# Patient Record
Sex: FEMALE | Race: WHITE | Employment: OTHER | URBAN - METROPOLITAN AREA
[De-identification: names, ages, dates, MRNs, and addresses within clinical notes are randomized per-mention and may not be internally consistent; named-entity substitution may affect disease eponyms.]

---

## 2017-01-09 ENCOUNTER — OFFICE VISIT (OUTPATIENT)
Dept: FAMILY MEDICINE CLINIC | Age: 77
End: 2017-01-09

## 2017-01-09 VITALS
TEMPERATURE: 98 F | OXYGEN SATURATION: 98 % | DIASTOLIC BLOOD PRESSURE: 72 MMHG | HEART RATE: 60 BPM | WEIGHT: 148 LBS | SYSTOLIC BLOOD PRESSURE: 112 MMHG | BODY MASS INDEX: 25.27 KG/M2 | HEIGHT: 64 IN

## 2017-01-09 DIAGNOSIS — E78.2 MIXED HYPERLIPIDEMIA: ICD-10-CM

## 2017-01-09 DIAGNOSIS — Z13.820 OSTEOPOROSIS SCREENING: ICD-10-CM

## 2017-01-09 DIAGNOSIS — I48.4 ATYPICAL ATRIAL FLUTTER (HCC): ICD-10-CM

## 2017-01-09 DIAGNOSIS — Z23 NEEDS FLU SHOT: ICD-10-CM

## 2017-01-09 DIAGNOSIS — I34.2 NON-RHEUMATIC MITRAL VALVE STENOSIS: Primary | ICD-10-CM

## 2017-01-09 DIAGNOSIS — M85.80 OSTEOPENIA: ICD-10-CM

## 2017-01-09 PROCEDURE — 99214 OFFICE O/P EST MOD 30 MIN: CPT | Performed by: FAMILY MEDICINE

## 2017-01-09 PROCEDURE — G0008 ADMIN INFLUENZA VIRUS VAC: HCPCS | Performed by: FAMILY MEDICINE

## 2017-01-09 PROCEDURE — 90662 IIV NO PRSV INCREASED AG IM: CPT | Performed by: FAMILY MEDICINE

## 2017-01-09 RX ORDER — DILTIAZEM HYDROCHLORIDE 180 MG/1
180 CAPSULE, COATED, EXTENDED RELEASE ORAL DAILY
Qty: 90 CAPSULE | Refills: 3 | Status: SHIPPED | OUTPATIENT
Start: 2017-01-09 | End: 2017-04-17 | Stop reason: SDUPTHER

## 2017-01-23 ENCOUNTER — NURSE ONLY (OUTPATIENT)
Dept: FAMILY MEDICINE CLINIC | Age: 77
End: 2017-01-23

## 2017-01-23 DIAGNOSIS — I34.2 NON-RHEUMATIC MITRAL VALVE STENOSIS: Primary | ICD-10-CM

## 2017-01-23 DIAGNOSIS — E78.2 MIXED HYPERLIPIDEMIA: ICD-10-CM

## 2017-01-23 DIAGNOSIS — I48.4 ATYPICAL ATRIAL FLUTTER (HCC): ICD-10-CM

## 2017-01-23 LAB
A/G RATIO: 1.8 (ref 1.1–2.2)
ALBUMIN SERPL-MCNC: 4.3 G/DL (ref 3.4–5)
ALP BLD-CCNC: 63 U/L (ref 40–129)
ALT SERPL-CCNC: 11 U/L (ref 10–40)
ANION GAP SERPL CALCULATED.3IONS-SCNC: 14 MMOL/L (ref 3–16)
AST SERPL-CCNC: 16 U/L (ref 15–37)
BILIRUB SERPL-MCNC: 0.8 MG/DL (ref 0–1)
BUN BLDV-MCNC: 18 MG/DL (ref 7–20)
CALCIUM SERPL-MCNC: 9.2 MG/DL (ref 8.3–10.6)
CHLORIDE BLD-SCNC: 99 MMOL/L (ref 99–110)
CHOLESTEROL, TOTAL: 242 MG/DL (ref 0–199)
CO2: 28 MMOL/L (ref 21–32)
CREAT SERPL-MCNC: 0.8 MG/DL (ref 0.6–1.2)
GFR AFRICAN AMERICAN: >60
GFR NON-AFRICAN AMERICAN: >60
GLOBULIN: 2.4 G/DL
GLUCOSE BLD-MCNC: 85 MG/DL (ref 70–99)
HCT VFR BLD CALC: 40.9 % (ref 36–48)
HDLC SERPL-MCNC: 39 MG/DL (ref 40–60)
HEMOGLOBIN: 13.6 G/DL (ref 12–16)
LDL CHOLESTEROL CALCULATED: 168 MG/DL
MCH RBC QN AUTO: 30.8 PG (ref 26–34)
MCHC RBC AUTO-ENTMCNC: 33.2 G/DL (ref 31–36)
MCV RBC AUTO: 92.8 FL (ref 80–100)
PDW BLD-RTO: 13.4 % (ref 12.4–15.4)
PLATELET # BLD: 224 K/UL (ref 135–450)
PMV BLD AUTO: 9.1 FL (ref 5–10.5)
POTASSIUM SERPL-SCNC: 4.3 MMOL/L (ref 3.5–5.1)
RBC # BLD: 4.41 M/UL (ref 4–5.2)
SODIUM BLD-SCNC: 141 MMOL/L (ref 136–145)
TOTAL PROTEIN: 6.7 G/DL (ref 6.4–8.2)
TRIGL SERPL-MCNC: 174 MG/DL (ref 0–150)
VLDLC SERPL CALC-MCNC: 35 MG/DL
WBC # BLD: 5.2 K/UL (ref 4–11)

## 2017-01-23 PROCEDURE — 36415 COLL VENOUS BLD VENIPUNCTURE: CPT | Performed by: FAMILY MEDICINE

## 2017-03-13 ENCOUNTER — TELEPHONE (OUTPATIENT)
Dept: FAMILY MEDICINE CLINIC | Age: 77
End: 2017-03-13

## 2017-04-17 DIAGNOSIS — I48.4 ATYPICAL ATRIAL FLUTTER (HCC): ICD-10-CM

## 2017-04-17 RX ORDER — DILTIAZEM HYDROCHLORIDE 180 MG/1
180 CAPSULE, COATED, EXTENDED RELEASE ORAL DAILY
Qty: 90 CAPSULE | Refills: 3 | Status: SHIPPED | OUTPATIENT
Start: 2017-04-17 | End: 2018-04-20 | Stop reason: SDUPTHER

## 2017-05-03 ENCOUNTER — OFFICE VISIT (OUTPATIENT)
Dept: CARDIOLOGY CLINIC | Age: 77
End: 2017-05-03

## 2017-05-03 VITALS
BODY MASS INDEX: 24.92 KG/M2 | DIASTOLIC BLOOD PRESSURE: 68 MMHG | HEART RATE: 51 BPM | WEIGHT: 146 LBS | SYSTOLIC BLOOD PRESSURE: 126 MMHG | HEIGHT: 64 IN

## 2017-05-03 DIAGNOSIS — I48.4 ATYPICAL ATRIAL FLUTTER (HCC): Primary | ICD-10-CM

## 2017-05-03 DIAGNOSIS — I34.0 SEVERE MITRAL REGURGITATION: ICD-10-CM

## 2017-05-03 DIAGNOSIS — I48.3 TYPICAL ATRIAL FLUTTER (HCC): ICD-10-CM

## 2017-05-03 DIAGNOSIS — I47.1 SUPRAVENTRICULAR TACHYCARDIA (HCC): ICD-10-CM

## 2017-05-03 PROCEDURE — 99213 OFFICE O/P EST LOW 20 MIN: CPT | Performed by: INTERNAL MEDICINE

## 2017-05-03 PROCEDURE — 93000 ELECTROCARDIOGRAM COMPLETE: CPT | Performed by: INTERNAL MEDICINE

## 2017-05-18 ENCOUNTER — HOSPITAL ENCOUNTER (OUTPATIENT)
Dept: MAMMOGRAPHY | Age: 77
Discharge: HOME | End: 2017-05-18
Attending: FAMILY MEDICINE | Admitting: FAMILY MEDICINE

## 2017-05-18 DIAGNOSIS — Z13.820 ENCOUNTER FOR SCREENING FOR OSTEOPOROSIS: ICD-10-CM

## 2017-05-18 DIAGNOSIS — Z12.31 VISIT FOR SCREENING MAMMOGRAM: ICD-10-CM

## 2017-07-24 ENCOUNTER — OFFICE VISIT (OUTPATIENT)
Dept: FAMILY MEDICINE CLINIC | Age: 77
End: 2017-07-24

## 2017-07-24 VITALS
WEIGHT: 148 LBS | OXYGEN SATURATION: 98 % | HEART RATE: 63 BPM | RESPIRATION RATE: 16 BRPM | BODY MASS INDEX: 25.4 KG/M2 | DIASTOLIC BLOOD PRESSURE: 78 MMHG | SYSTOLIC BLOOD PRESSURE: 128 MMHG

## 2017-07-24 DIAGNOSIS — Z98.890 S/P ABLATION OPERATION FOR ARRHYTHMIA: ICD-10-CM

## 2017-07-24 DIAGNOSIS — I48.0 PAROXYSMAL ATRIAL FIBRILLATION (HCC): Primary | ICD-10-CM

## 2017-07-24 DIAGNOSIS — Z86.79 S/P ABLATION OPERATION FOR ARRHYTHMIA: ICD-10-CM

## 2017-07-24 DIAGNOSIS — I34.0 NON-RHEUMATIC MITRAL REGURGITATION: ICD-10-CM

## 2017-07-24 DIAGNOSIS — Z23 NEED FOR PNEUMOCOCCAL VACCINATION: ICD-10-CM

## 2017-07-24 DIAGNOSIS — E78.2 MIXED HYPERLIPIDEMIA: ICD-10-CM

## 2017-07-24 PROCEDURE — 90732 PPSV23 VACC 2 YRS+ SUBQ/IM: CPT | Performed by: FAMILY MEDICINE

## 2017-07-24 PROCEDURE — G0009 ADMIN PNEUMOCOCCAL VACCINE: HCPCS | Performed by: FAMILY MEDICINE

## 2017-07-24 PROCEDURE — 99214 OFFICE O/P EST MOD 30 MIN: CPT | Performed by: FAMILY MEDICINE

## 2017-07-24 ASSESSMENT — PATIENT HEALTH QUESTIONNAIRE - PHQ9
SUM OF ALL RESPONSES TO PHQ QUESTIONS 1-9: 0
2. FEELING DOWN, DEPRESSED OR HOPELESS: 0
1. LITTLE INTEREST OR PLEASURE IN DOING THINGS: 0
SUM OF ALL RESPONSES TO PHQ9 QUESTIONS 1 & 2: 0

## 2017-10-17 ENCOUNTER — TELEPHONE (OUTPATIENT)
Dept: CARDIOLOGY CLINIC | Age: 77
End: 2017-10-17

## 2017-11-14 ENCOUNTER — TELEPHONE (OUTPATIENT)
Dept: CARDIOLOGY CLINIC | Age: 77
End: 2017-11-14

## 2017-12-13 ENCOUNTER — TELEPHONE (OUTPATIENT)
Dept: CARDIOLOGY CLINIC | Age: 77
End: 2017-12-13

## 2017-12-13 DIAGNOSIS — I48.4 ATYPICAL ATRIAL FLUTTER (HCC): ICD-10-CM

## 2017-12-28 ENCOUNTER — TELEPHONE (OUTPATIENT)
Dept: CARDIOLOGY CLINIC | Age: 77
End: 2017-12-28

## 2017-12-29 ENCOUNTER — TELEPHONE (OUTPATIENT)
Dept: CARDIOLOGY CLINIC | Age: 77
End: 2017-12-29

## 2018-01-15 DIAGNOSIS — I48.4 ATYPICAL ATRIAL FLUTTER (HCC): ICD-10-CM

## 2018-01-15 NOTE — TELEPHONE ENCOUNTER
Refills needed apixaban (ELIQUIS) 5 MG TABS tablet, diltiazem (CARDIZEM CD) 180 MG extended release capsule,  metoprolol tartrate (LOPRESSOR) 25 MG tablet     :  [facility] [address] [facility] [id number] - F [phone]

## 2018-01-24 ENCOUNTER — OFFICE VISIT (OUTPATIENT)
Dept: FAMILY MEDICINE CLINIC | Age: 78
End: 2018-01-24

## 2018-01-24 VITALS
HEART RATE: 62 BPM | SYSTOLIC BLOOD PRESSURE: 136 MMHG | BODY MASS INDEX: 25.58 KG/M2 | OXYGEN SATURATION: 97 % | WEIGHT: 149 LBS | DIASTOLIC BLOOD PRESSURE: 80 MMHG

## 2018-01-24 DIAGNOSIS — Z86.79 HISTORY OF ATRIAL FLUTTER: ICD-10-CM

## 2018-01-24 DIAGNOSIS — E78.2 MIXED HYPERLIPIDEMIA: ICD-10-CM

## 2018-01-24 DIAGNOSIS — I34.2 NON-RHEUMATIC MITRAL VALVE STENOSIS: ICD-10-CM

## 2018-01-24 DIAGNOSIS — Z23 NEEDS FLU SHOT: ICD-10-CM

## 2018-01-24 DIAGNOSIS — I48.0 PAROXYSMAL ATRIAL FIBRILLATION (HCC): Primary | ICD-10-CM

## 2018-01-24 LAB
A/G RATIO: 1.9 (ref 1.1–2.2)
ALBUMIN SERPL-MCNC: 4.5 G/DL (ref 3.4–5)
ALP BLD-CCNC: 57 U/L (ref 40–129)
ALT SERPL-CCNC: 12 U/L (ref 10–40)
ANION GAP SERPL CALCULATED.3IONS-SCNC: 14 MMOL/L (ref 3–16)
AST SERPL-CCNC: 17 U/L (ref 15–37)
BILIRUB SERPL-MCNC: 0.7 MG/DL (ref 0–1)
BUN BLDV-MCNC: 15 MG/DL (ref 7–20)
CALCIUM SERPL-MCNC: 9.4 MG/DL (ref 8.3–10.6)
CHLORIDE BLD-SCNC: 103 MMOL/L (ref 99–110)
CHOLESTEROL, TOTAL: 238 MG/DL (ref 0–199)
CO2: 27 MMOL/L (ref 21–32)
CREAT SERPL-MCNC: 0.9 MG/DL (ref 0.6–1.2)
GFR AFRICAN AMERICAN: >60
GFR NON-AFRICAN AMERICAN: >60
GLOBULIN: 2.4 G/DL
GLUCOSE BLD-MCNC: 96 MG/DL (ref 70–99)
HDLC SERPL-MCNC: 37 MG/DL (ref 40–60)
LDL CHOLESTEROL CALCULATED: 153 MG/DL
POTASSIUM SERPL-SCNC: 4.4 MMOL/L (ref 3.5–5.1)
SODIUM BLD-SCNC: 144 MMOL/L (ref 136–145)
TOTAL PROTEIN: 6.9 G/DL (ref 6.4–8.2)
TRIGL SERPL-MCNC: 240 MG/DL (ref 0–150)
VLDLC SERPL CALC-MCNC: 48 MG/DL

## 2018-01-24 PROCEDURE — 36415 COLL VENOUS BLD VENIPUNCTURE: CPT | Performed by: FAMILY MEDICINE

## 2018-01-24 PROCEDURE — 99214 OFFICE O/P EST MOD 30 MIN: CPT | Performed by: FAMILY MEDICINE

## 2018-01-24 PROCEDURE — 90662 IIV NO PRSV INCREASED AG IM: CPT | Performed by: FAMILY MEDICINE

## 2018-01-24 PROCEDURE — G0008 ADMIN INFLUENZA VIRUS VAC: HCPCS | Performed by: FAMILY MEDICINE

## 2018-01-24 ASSESSMENT — ENCOUNTER SYMPTOMS
WHEEZING: 0
SHORTNESS OF BREATH: 0
CHEST TIGHTNESS: 0

## 2018-01-29 ENCOUNTER — OFFICE VISIT (OUTPATIENT)
Dept: CARDIOLOGY CLINIC | Age: 78
End: 2018-01-29

## 2018-01-29 VITALS
DIASTOLIC BLOOD PRESSURE: 80 MMHG | BODY MASS INDEX: 25.7 KG/M2 | WEIGHT: 150.5 LBS | HEIGHT: 64 IN | OXYGEN SATURATION: 95 % | SYSTOLIC BLOOD PRESSURE: 134 MMHG | HEART RATE: 62 BPM

## 2018-01-29 DIAGNOSIS — I34.0 NON-RHEUMATIC MITRAL REGURGITATION: ICD-10-CM

## 2018-01-29 DIAGNOSIS — I48.4 ATYPICAL ATRIAL FLUTTER (HCC): Primary | ICD-10-CM

## 2018-01-29 PROCEDURE — 93000 ELECTROCARDIOGRAM COMPLETE: CPT | Performed by: NURSE PRACTITIONER

## 2018-01-29 PROCEDURE — 99213 OFFICE O/P EST LOW 20 MIN: CPT | Performed by: NURSE PRACTITIONER

## 2018-01-29 NOTE — LETTER
regurgitation; Osteopenia; and Shingles. Past Surgical History:   has a past surgical history that includes knee surgery (Left, 2010); Colonoscopy; and ablation of dysrhythmic focus (1/13/16). Home Medications:    Prior to Admission medications    Medication Sig Start Date End Date Taking? Authorizing Provider   apixaban (ELIQUIS) 5 MG TABS tablet Take 1 tablet by mouth 2 times daily 1/15/18  Yes Rosibel Dalal CNP   diltiazem (CARDIZEM CD) 180 MG extended release capsule Take 1 capsule by mouth daily 4/17/17  Yes Rosibel Dalal CNP   metoprolol tartrate (LOPRESSOR) 25 MG tablet Take 1 tablet by mouth 2 times daily 4/17/17  Yes Rosibel Dalal CNP   calcium-vitamin D (OSCAL-500) 500-200 MG-UNIT per tablet Take 1 tablet by mouth 2 times daily 1/14/16  Yes Bradley Lira MD       Allergies:  Lisinopril    Social History:   reports that she has never smoked. She has never used smokeless tobacco. She reports that she does not drink alcohol or use drugs. Family History: family history includes Cancer in her father and mother; High Cholesterol in her brother; Other in her mother; Stroke in her brother. Review of Systems   Constitutional: Negative. HENT: Negative. Eyes: Negative. Respiratory: Negative. Cardiovascular: see HPI   Gastrointestinal: Negative. Genitourinary: Negative. Musculoskeletal: Negative. Skin: Negative. Neurological: Negative. Hematological: Negative. Psychiatric/Behavioral: Negative. PHYSICAL EXAM:    Physical Examination:    /80   Pulse 62   Ht 5' 4\" (1.626 m)   Wt 150 lb 8 oz (68.3 kg)   SpO2 95%   BMI 25.83 kg/m²       Constitutional and general appearance: alert, cooperative, no distress and appears stated age  HEENT: PERRL, no cervical lymphadenopathy. No masses palpable.  Normal oral mucosa  Respiratory:  · Normal excursion and expansion without use of accessory muscles

## 2018-01-29 NOTE — PROGRESS NOTES
(LOPRESSOR) 25 MG tablet Take 1 tablet by mouth 2 times daily 4/17/17  Yes Lukas Anderson CNP   calcium-vitamin D (OSCAL-500) 500-200 MG-UNIT per tablet Take 1 tablet by mouth 2 times daily 1/14/16  Yes Namrata Juarez MD       Allergies:  Lisinopril    Social History:   reports that she has never smoked. She has never used smokeless tobacco. She reports that she does not drink alcohol or use drugs. Family History: family history includes Cancer in her father and mother; High Cholesterol in her brother; Other in her mother; Stroke in her brother. Review of Systems   Constitutional: Negative. HENT: Negative. Eyes: Negative. Respiratory: Negative. Cardiovascular: see HPI   Gastrointestinal: Negative. Genitourinary: Negative. Musculoskeletal: Negative. Skin: Negative. Neurological: Negative. Hematological: Negative. Psychiatric/Behavioral: Negative. PHYSICAL EXAM:    Physical Examination:    /80   Pulse 62   Ht 5' 4\" (1.626 m)   Wt 150 lb 8 oz (68.3 kg)   SpO2 95%   BMI 25.83 kg/m²      Constitutional and general appearance: alert, cooperative, no distress and appears stated age  HEENT: PERRL, no cervical lymphadenopathy. No masses palpable. Normal oral mucosa  Respiratory:  · Normal excursion and expansion without use of accessory muscles  · Resp auscultation: Normal breath sounds without dullness or wheezing  Cardiovascular:  · The apical impulse is not displaced  · Gr 2/6 systolic murmur.  Regular S1 and S2.  · Jugular venous pulsation Normal  · The carotid upstroke is normal in amplitude and contour without delay or bruit  · Peripheral pulses are symmetrical and full   Abdomen:  · No masses or tenderness  · Bowel sounds present  Extremities:  ·  No cyanosis or clubbing  ·  No lower extremity edema  ·  Skin: warm and dry  Neurological:  · Alert and oriented  · Moves all extremities well  · No abnormalities of mood, affect, memory, mentation, or behavior are noted    DATA:    ECG 1/29/2018:  Sinus bradycardia, HR 58    Echo 11/29/2016:  Normal left ventricle size with mild concentric left ventricular hypertrophy.   Normal systolic function with an estimated ejection fraction of 55%.   No regional wall motion abnormalities are seen.   Elevated left ventricular diastolic filling pressure ( E/e' 14.5 ).  The left atrium is severely dilated.   Moderate posterior mitral annular calcification is present.   Moderate mitral regurgitation.   Aortic valve appears sclerotic but opens adequately. Echo 1/27/2016:  -Normal left ventricle size, wall thickness and systolic function with an estimated ejection fraction of 55%.   -No regional wall motion abnormalities are seen.   -The left atrium is severly dilated.   -The right atrium is mildly dilated.   -Small ostium primum atrial septal defect with left-to-right shunt was visualized by color flow.   -Mitral annular calcification with restricted movement of the posterior leaflet.   -The maximum mitral valve pressure gradient is 12 mmHg and the mean pressure gradient is 3 mmHg.   -Mild mitral stenosis.   -At least moderate mitral regurgitation.   -Aortic valve appears sclerotic but opens adequately.   -Trace pulmonic and aortic valve regurgitation.   -Mild tricuspid regurgitation.   -Systolic pulmonary artery pressure (SPAP) is normal and estimated at 33mmHg (RA pressure 3 mmHg). ADAM 1/8/2016:  Normal left ventricle size and wall thickness and left ventricular systolic   function with an estimated ejection fraction of 55%.   Moderate to severe mitral regurgitation is present.   The left atrium is dilated.   A bubble study was performed and showed no evidence of shunting. CARDIOLOGY LABS:   CBC: No results for input(s): WBC, HGB, HCT, PLT in the last 72 hours. BMP: No results for input(s): NA, K, CO2, BUN, CREATININE, LABGLOM, GLUCOSE in the last 72 hours.   PT/INR: No results for input(s): PROTIME, INR in the last 72

## 2018-02-14 NOTE — COMMUNICATION BODY
hours.  APTT:No results for input(s): APTT in the last 72 hours. FASTING LIPID PANEL:  Lab Results   Component Value Date    HDL 37 01/24/2018    LDLCALC 153 01/24/2018    TRIG 240 01/24/2018     LIVER PROFILE:No results for input(s): AST, ALT, ALB in the last 72 hours. Assessment:   1. Atypical left sided atrial flutter left sided atrial tachycardia: s/p ablation both 1/13/2016 with recurrence on EKG 1/22/2016    -rhythm is sinus today on metoprolol and diltiazem   -on Eliquis for [id number] score 3 (age and gender)  2. Sinus bradycardia: asymptomatic  3. Mitral regurgitation: moderate on echo   4. Mitral stenosis: mild on echo 1/2016    Plan:   1. No changes today   2. Follow up with Dr. Rose Roberson in one year. Patient would like to stop anticoagulation. Recommend continuing due to asymptomatic atrial arrhythmias with recurrence post ablation.      Ad Bliss, [address]  [phone]

## 2018-02-23 ENCOUNTER — TELEPHONE (OUTPATIENT)
Dept: CARDIOLOGY CLINIC | Age: 78
End: 2018-02-23

## 2018-03-09 ENCOUNTER — TELEPHONE (OUTPATIENT)
Dept: CARDIOLOGY CLINIC | Age: 78
End: 2018-03-09

## 2018-03-09 NOTE — TELEPHONE ENCOUNTER
Pt states she is having cataract surgery on 5/1 and 6/5 and needs cardiac clearance and also needs the ok to hold Eliquis 4 days prior. Please send clearance to [facility] (Dr Kyrie Crandall) at [phone].

## 2018-03-23 ENCOUNTER — OFFICE VISIT (OUTPATIENT)
Dept: FAMILY MEDICINE CLINIC | Age: 78
End: 2018-03-23

## 2018-03-23 VITALS
DIASTOLIC BLOOD PRESSURE: 76 MMHG | SYSTOLIC BLOOD PRESSURE: 138 MMHG | OXYGEN SATURATION: 97 % | HEART RATE: 67 BPM | BODY MASS INDEX: 25.06 KG/M2 | WEIGHT: 146 LBS

## 2018-03-23 DIAGNOSIS — I10 HYPERTENSION, UNSPECIFIED TYPE: Primary | ICD-10-CM

## 2018-03-23 PROCEDURE — 99213 OFFICE O/P EST LOW 20 MIN: CPT | Performed by: FAMILY MEDICINE

## 2018-03-23 RX ORDER — ERYTHROMYCIN 5 MG/G
OINTMENT OPHTHALMIC
COMMUNITY
Start: 2018-03-07 | End: 2018-07-23

## 2018-03-23 RX ORDER — PREDNISOLONE ACETATE 10 MG/ML
SUSPENSION/ DROPS OPHTHALMIC
COMMUNITY
Start: 2018-03-07 | End: 2018-07-23

## 2018-03-30 ASSESSMENT — ENCOUNTER SYMPTOMS: BLURRED VISION: 0

## 2018-04-20 DIAGNOSIS — I48.4 ATYPICAL ATRIAL FLUTTER (HCC): ICD-10-CM

## 2018-04-20 RX ORDER — DILTIAZEM HYDROCHLORIDE 180 MG/1
180 CAPSULE, COATED, EXTENDED RELEASE ORAL DAILY
Qty: 90 CAPSULE | Refills: 3 | Status: SHIPPED | OUTPATIENT
Start: 2018-04-20 | End: 2019-01-28 | Stop reason: SDUPTHER

## 2018-04-23 ENCOUNTER — OFFICE VISIT (OUTPATIENT)
Dept: FAMILY MEDICINE CLINIC | Age: 78
End: 2018-04-23

## 2018-04-23 VITALS
SYSTOLIC BLOOD PRESSURE: 128 MMHG | DIASTOLIC BLOOD PRESSURE: 80 MMHG | OXYGEN SATURATION: 96 % | BODY MASS INDEX: 25.06 KG/M2 | WEIGHT: 146 LBS | HEART RATE: 59 BPM

## 2018-04-23 DIAGNOSIS — H25.013 CORTICAL AGE-RELATED CATARACT OF BOTH EYES: Primary | ICD-10-CM

## 2018-04-23 DIAGNOSIS — Z01.818 PREOP EXAMINATION: ICD-10-CM

## 2018-04-23 PROCEDURE — 99214 OFFICE O/P EST MOD 30 MIN: CPT | Performed by: FAMILY MEDICINE

## 2018-05-01 ENCOUNTER — HOSPITAL ENCOUNTER (OUTPATIENT)
Dept: SURGERY | Age: 78
Discharge: HOME | End: 2018-05-01
Attending: OPHTHALMOLOGY | Admitting: OPHTHALMOLOGY

## 2018-05-01 VITALS
TEMPERATURE: 98.3 F | DIASTOLIC BLOOD PRESSURE: 68 MMHG | HEIGHT: 64 IN | BODY MASS INDEX: 24.92 KG/M2 | HEART RATE: 49 BPM | SYSTOLIC BLOOD PRESSURE: 149 MMHG | OXYGEN SATURATION: 98 % | WEIGHT: 146 LBS | RESPIRATION RATE: 16 BRPM

## 2018-05-01 RX ORDER — DIPHENHYDRAMINE HYDROCHLORIDE 50 MG/ML
12.5 INJECTION INTRAMUSCULAR; INTRAVENOUS
Status: ACTIVE | OUTPATIENT
Start: 2018-05-01 | End: 2018-05-01

## 2018-05-01 RX ORDER — ONDANSETRON 2 MG/ML
4 INJECTION INTRAMUSCULAR; INTRAVENOUS PRN
Status: DISCONTINUED | OUTPATIENT
Start: 2018-05-01 | End: 2018-05-02 | Stop reason: HOSPADM

## 2018-05-01 RX ORDER — HYDRALAZINE HYDROCHLORIDE 20 MG/ML
5 INJECTION INTRAMUSCULAR; INTRAVENOUS EVERY 10 MIN PRN
Status: DISCONTINUED | OUTPATIENT
Start: 2018-05-01 | End: 2018-05-02 | Stop reason: HOSPADM

## 2018-05-01 RX ORDER — LABETALOL HYDROCHLORIDE 5 MG/ML
5 INJECTION, SOLUTION INTRAVENOUS EVERY 10 MIN PRN
Status: DISCONTINUED | OUTPATIENT
Start: 2018-05-01 | End: 2018-05-02 | Stop reason: HOSPADM

## 2018-05-01 RX ORDER — MORPHINE SULFATE 2 MG/ML
1 INJECTION, SOLUTION INTRAMUSCULAR; INTRAVENOUS EVERY 5 MIN PRN
Status: DISCONTINUED | OUTPATIENT
Start: 2018-05-01 | End: 2018-05-02 | Stop reason: HOSPADM

## 2018-05-01 RX ORDER — MEPERIDINE HYDROCHLORIDE 50 MG/ML
12.5 INJECTION INTRAMUSCULAR; INTRAVENOUS; SUBCUTANEOUS EVERY 5 MIN PRN
Status: DISCONTINUED | OUTPATIENT
Start: 2018-05-01 | End: 2018-05-02 | Stop reason: HOSPADM

## 2018-05-01 RX ORDER — SODIUM CHLORIDE, SODIUM LACTATE, POTASSIUM CHLORIDE, CALCIUM CHLORIDE 600; 310; 30; 20 MG/100ML; MG/100ML; MG/100ML; MG/100ML
INJECTION, SOLUTION INTRAVENOUS CONTINUOUS
Status: DISCONTINUED | OUTPATIENT
Start: 2018-05-01 | End: 2018-05-02 | Stop reason: HOSPADM

## 2018-05-01 RX ORDER — PROMETHAZINE HYDROCHLORIDE 25 MG/ML
6.25 INJECTION, SOLUTION INTRAMUSCULAR; INTRAVENOUS
Status: DISCONTINUED | OUTPATIENT
Start: 2018-05-01 | End: 2018-05-02 | Stop reason: HOSPADM

## 2018-05-01 RX ORDER — MORPHINE SULFATE 2 MG/ML
2 INJECTION, SOLUTION INTRAMUSCULAR; INTRAVENOUS EVERY 5 MIN PRN
Status: DISCONTINUED | OUTPATIENT
Start: 2018-05-01 | End: 2018-05-02 | Stop reason: HOSPADM

## 2018-05-01 RX ORDER — OXYCODONE HYDROCHLORIDE AND ACETAMINOPHEN 5; 325 MG/1; MG/1
1 TABLET ORAL PRN
Status: ACTIVE | OUTPATIENT
Start: 2018-05-01 | End: 2018-05-01

## 2018-05-01 RX ORDER — OXYCODONE HYDROCHLORIDE AND ACETAMINOPHEN 5; 325 MG/1; MG/1
2 TABLET ORAL PRN
Status: ACTIVE | OUTPATIENT
Start: 2018-05-01 | End: 2018-05-01

## 2018-05-01 RX ADMIN — SODIUM CHLORIDE, SODIUM LACTATE, POTASSIUM CHLORIDE, CALCIUM CHLORIDE: 600; 310; 30; 20 INJECTION, SOLUTION INTRAVENOUS at 11:08

## 2018-05-01 ASSESSMENT — PAIN - FUNCTIONAL ASSESSMENT: PAIN_FUNCTIONAL_ASSESSMENT: 0-10

## 2018-05-01 ASSESSMENT — PAIN SCALES - GENERAL: PAINLEVEL_OUTOF10: 0

## 2018-05-14 DIAGNOSIS — I48.4 ATYPICAL ATRIAL FLUTTER (HCC): ICD-10-CM

## 2018-05-21 ENCOUNTER — HOSPITAL ENCOUNTER (OUTPATIENT)
Dept: MAMMOGRAPHY | Age: 78
Discharge: HOME | End: 2018-05-21
Attending: FAMILY MEDICINE | Admitting: FAMILY MEDICINE

## 2018-05-21 DIAGNOSIS — Z12.31 ENCOUNTER FOR SCREENING MAMMOGRAM FOR BREAST CANCER: ICD-10-CM

## 2018-06-05 ENCOUNTER — HOSPITAL ENCOUNTER (OUTPATIENT)
Dept: SURGERY | Age: 78
Discharge: HOME | End: 2018-06-05
Attending: OPHTHALMOLOGY | Admitting: OPHTHALMOLOGY

## 2018-06-05 VITALS
HEIGHT: 64 IN | TEMPERATURE: 97.3 F | BODY MASS INDEX: 23.05 KG/M2 | SYSTOLIC BLOOD PRESSURE: 162 MMHG | WEIGHT: 135 LBS | OXYGEN SATURATION: 98 % | RESPIRATION RATE: 12 BRPM | DIASTOLIC BLOOD PRESSURE: 61 MMHG | HEART RATE: 50 BPM

## 2018-06-05 RX ORDER — PROMETHAZINE HYDROCHLORIDE 25 MG/ML
6.25 INJECTION, SOLUTION INTRAMUSCULAR; INTRAVENOUS
Status: ACTIVE | OUTPATIENT
Start: 2018-06-05 | End: 2018-06-05

## 2018-06-05 RX ORDER — OXYCODONE HYDROCHLORIDE AND ACETAMINOPHEN 5; 325 MG/1; MG/1
2 TABLET ORAL PRN
Status: ACTIVE | OUTPATIENT
Start: 2018-06-05 | End: 2018-06-05

## 2018-06-05 RX ORDER — ONDANSETRON 2 MG/ML
4 INJECTION INTRAMUSCULAR; INTRAVENOUS
Status: ACTIVE | OUTPATIENT
Start: 2018-06-05 | End: 2018-06-05

## 2018-06-05 RX ORDER — DIPHENHYDRAMINE HYDROCHLORIDE 50 MG/ML
12.5 INJECTION INTRAMUSCULAR; INTRAVENOUS
Status: ACTIVE | OUTPATIENT
Start: 2018-06-05 | End: 2018-06-05

## 2018-06-05 RX ORDER — LABETALOL HYDROCHLORIDE 5 MG/ML
5 INJECTION, SOLUTION INTRAVENOUS EVERY 10 MIN PRN
Status: DISCONTINUED | OUTPATIENT
Start: 2018-06-05 | End: 2018-06-06 | Stop reason: HOSPADM

## 2018-06-05 RX ORDER — OXYCODONE HYDROCHLORIDE AND ACETAMINOPHEN 5; 325 MG/1; MG/1
1 TABLET ORAL PRN
Status: ACTIVE | OUTPATIENT
Start: 2018-06-05 | End: 2018-06-05

## 2018-06-05 RX ORDER — SODIUM CHLORIDE, SODIUM LACTATE, POTASSIUM CHLORIDE, CALCIUM CHLORIDE 600; 310; 30; 20 MG/100ML; MG/100ML; MG/100ML; MG/100ML
INJECTION, SOLUTION INTRAVENOUS CONTINUOUS
Status: DISCONTINUED | OUTPATIENT
Start: 2018-06-05 | End: 2018-06-06 | Stop reason: HOSPADM

## 2018-06-05 RX ORDER — MORPHINE SULFATE 2 MG/ML
1 INJECTION, SOLUTION INTRAMUSCULAR; INTRAVENOUS EVERY 5 MIN PRN
Status: DISCONTINUED | OUTPATIENT
Start: 2018-06-05 | End: 2018-06-06 | Stop reason: HOSPADM

## 2018-06-05 RX ORDER — HYDRALAZINE HYDROCHLORIDE 20 MG/ML
5 INJECTION INTRAMUSCULAR; INTRAVENOUS EVERY 10 MIN PRN
Status: DISCONTINUED | OUTPATIENT
Start: 2018-06-05 | End: 2018-06-06 | Stop reason: HOSPADM

## 2018-06-05 RX ORDER — MEPERIDINE HYDROCHLORIDE 50 MG/ML
12.5 INJECTION INTRAMUSCULAR; INTRAVENOUS; SUBCUTANEOUS EVERY 5 MIN PRN
Status: DISCONTINUED | OUTPATIENT
Start: 2018-06-05 | End: 2018-06-06 | Stop reason: HOSPADM

## 2018-06-05 RX ORDER — MORPHINE SULFATE 2 MG/ML
2 INJECTION, SOLUTION INTRAMUSCULAR; INTRAVENOUS EVERY 5 MIN PRN
Status: DISCONTINUED | OUTPATIENT
Start: 2018-06-05 | End: 2018-06-06 | Stop reason: HOSPADM

## 2018-06-05 RX ADMIN — SODIUM CHLORIDE, SODIUM LACTATE, POTASSIUM CHLORIDE, CALCIUM CHLORIDE: 600; 310; 30; 20 INJECTION, SOLUTION INTRAVENOUS at 09:59

## 2018-06-05 ASSESSMENT — PAIN SCALES - GENERAL: PAINLEVEL_OUTOF10: 0

## 2018-06-05 ASSESSMENT — PAIN - FUNCTIONAL ASSESSMENT: PAIN_FUNCTIONAL_ASSESSMENT: 0-10

## 2018-06-08 ENCOUNTER — HOSPITAL ENCOUNTER (OUTPATIENT)
Dept: MAMMOGRAPHY | Age: 78
Discharge: HOME | End: 2018-06-08
Admitting: FAMILY MEDICINE

## 2018-06-08 ENCOUNTER — TELEPHONE (OUTPATIENT)
Dept: CARDIOLOGY CLINIC | Age: 78
End: 2018-06-08

## 2018-06-08 DIAGNOSIS — R92.8 ABNORMAL MAMMOGRAM: ICD-10-CM

## 2018-07-23 ENCOUNTER — OFFICE VISIT (OUTPATIENT)
Dept: FAMILY MEDICINE CLINIC | Age: 78
End: 2018-07-23

## 2018-07-23 VITALS
WEIGHT: 145 LBS | HEART RATE: 56 BPM | SYSTOLIC BLOOD PRESSURE: 128 MMHG | BODY MASS INDEX: 24.89 KG/M2 | OXYGEN SATURATION: 98 % | DIASTOLIC BLOOD PRESSURE: 70 MMHG

## 2018-07-23 DIAGNOSIS — I47.9 PAROXYSMAL TACHYCARDIA (HCC): Primary | ICD-10-CM

## 2018-07-23 DIAGNOSIS — Z23 NEED FOR SHINGLES VACCINE: ICD-10-CM

## 2018-07-23 DIAGNOSIS — I34.2 NON-RHEUMATIC MITRAL VALVE STENOSIS: ICD-10-CM

## 2018-07-23 DIAGNOSIS — E78.2 MIXED HYPERLIPIDEMIA: ICD-10-CM

## 2018-07-23 PROCEDURE — 99213 OFFICE O/P EST LOW 20 MIN: CPT | Performed by: FAMILY MEDICINE

## 2018-07-23 ASSESSMENT — ENCOUNTER SYMPTOMS
WHEEZING: 0
BLOOD IN STOOL: 0
CONSTIPATION: 0
SHORTNESS OF BREATH: 0
NAUSEA: 0
ABDOMINAL DISTENTION: 0
CHEST TIGHTNESS: 0
COLOR CHANGE: 0
ABDOMINAL PAIN: 0
COUGH: 0
VOMITING: 0
DIARRHEA: 0
EYES NEGATIVE: 1

## 2018-07-23 NOTE — PROGRESS NOTES
Negative for abdominal distention, abdominal pain, blood in stool, constipation, diarrhea, nausea and vomiting. Genitourinary: Negative for difficulty urinating and dysuria. Musculoskeletal: Negative for arthralgias. Skin: Negative for color change, pallor and rash. Neurological: Negative for dizziness, syncope, weakness, light-headedness and headaches. Speech difficulty:      Hematological: Negative. Psychiatric/Behavioral: Negative for dysphoric mood and sleep disturbance. The patient is not nervous/anxious. Physical Exam  /70 (Site: Right Arm, Position: Sitting, Cuff Size: Medium Adult)   Pulse 56   Wt 145 lb (65.8 kg)   SpO2 98%   BMI 24.89 kg/m²   Neck supple, no lad or tmg, no bruit  CV RRR 2/6 JACKELINE, Lungs CTA  Ext with strong pedal pulses, no edema      /70 (Site: Right Arm, Position: Sitting, Cuff Size: Medium Adult)   Pulse 56   Wt 145 lb (65.8 kg)   SpO2 98%   BMI 24.89 kg/m²     Assessment/Plan:    Miya Diaz was seen today for 6 month follow-up. Diagnoses and all orders for this visit:    Paroxysmal tachycardia [facility])   Per cardiology    Need for shingles vaccine  -     zoster recombinant adjuvanted vaccine (SHINGRIX) 50 MCG SUSR injection;  Inject 0.5 mLs into the muscle once for 1 dose    Non-rheumatic mitral valve stenosis   Stable, asymptomatic    Mixed hyperlipidemia   1/18 lipids reviewed, mod elevate LDL with HDL 37

## 2018-09-04 ENCOUNTER — TELEPHONE (OUTPATIENT)
Dept: CARDIOLOGY CLINIC | Age: 78
End: 2018-09-04

## 2018-10-25 ENCOUNTER — TELEPHONE (OUTPATIENT)
Dept: CARDIOLOGY CLINIC | Age: 78
End: 2018-10-25

## 2018-10-31 ENCOUNTER — OFFICE VISIT (OUTPATIENT)
Dept: FAMILY MEDICINE CLINIC | Age: 78
End: 2018-10-31
Payer: MEDICARE

## 2018-10-31 VITALS
DIASTOLIC BLOOD PRESSURE: 80 MMHG | HEIGHT: 65 IN | WEIGHT: 145.3 LBS | SYSTOLIC BLOOD PRESSURE: 130 MMHG | OXYGEN SATURATION: 95 % | HEART RATE: 96 BPM | BODY MASS INDEX: 24.21 KG/M2 | TEMPERATURE: 98.7 F

## 2018-10-31 DIAGNOSIS — Z00.00 ROUTINE GENERAL MEDICAL EXAMINATION AT A HEALTH CARE FACILITY: Primary | ICD-10-CM

## 2018-10-31 PROCEDURE — G0439 PPPS, SUBSEQ VISIT: HCPCS | Performed by: FAMILY MEDICINE

## 2018-10-31 ASSESSMENT — ANXIETY QUESTIONNAIRES: GAD7 TOTAL SCORE: 2

## 2018-10-31 ASSESSMENT — LIFESTYLE VARIABLES: HOW OFTEN DO YOU HAVE A DRINK CONTAINING ALCOHOL: 0

## 2018-10-31 ASSESSMENT — PATIENT HEALTH QUESTIONNAIRE - PHQ9
SUM OF ALL RESPONSES TO PHQ QUESTIONS 1-9: 0
SUM OF ALL RESPONSES TO PHQ QUESTIONS 1-9: 0

## 2018-10-31 NOTE — PATIENT INSTRUCTIONS
doctor can tell you the best ways to protect your bones from thinning. Follow-up care is a key part of your treatment and safety. Be sure to make and go to all appointments, and call your doctor if you are having problems. It's also a good idea to know your test results and keep a list of the medicines you take. How can you care for yourself at home? Get enough calcium and vitamin D. The [place] [employer] recommends adults younger than age 46 need 1,000 mg of calcium and 600 IU of vitamin D each day. Women ages 46 to 79 need 1,200 mg of calcium and 600 IU of vitamin D each day. Men ages 46 to 79 need 1,000 mg of calcium and 600 IU of vitamin D each day. Adults 71 and older need 1,200 mg of calcium and 800 IU of vitamin D each day. Eat foods rich in calcium, like yogurt, cheese, milk, and dark green vegetables. Eat foods rich in vitamin D, like eggs, fatty fish, cereal, and fortified milk. Get some sunshine. Your body uses sunshine to make its own vitamin D. The safest time to be out in the sun is before 10 a.m. or after 3 p.m. Avoid getting sunburned. Sunburn can increase your risk of skin cancer. Talk to your doctor about taking a calcium plus vitamin D supplement. Ask about what type of calcium is right for you, and how much to take at a time. Adults ages 23 to 48 should not get more than 2,500 mg of calcium and 4,000 IU of vitamin D each day, whether it is from supplements and/or food. Adults ages 46 and older should not get more than 2,000 mg of calcium and 4,000 IU of vitamin D each day from supplements and/or food. Get regular bone-building exercise. Weight-bearing and resistance exercises keep bones healthy by working the muscles and bones against gravity. Start out at an exercise level that feels right for you. Add a little at a time until you can do the following:  Do 30 minutes of weight-bearing exercise on most days of the week.  Walking, jogging, stair climbing, and dancing are good

## 2019-01-15 ENCOUNTER — TELEPHONE (OUTPATIENT)
Dept: FAMILY MEDICINE CLINIC | Age: 79
End: 2019-01-15

## 2019-01-15 NOTE — TELEPHONE ENCOUNTER
Patient's daughter, Alaina Rolle, called and would like for Gomez Haider to call her back to discuss issues regarding a change in her mom's memory status. She said that she wants to give Dr. Tee Kimble a heads up on what is going on with her mom. She said that her mom has an appointment to see Dr. Tee Kimble on 01.23.19 and she will be coming with her mom to the office visit. She is on her mom's HIPAA and her mom is aware that she is calling the office. She is aware that Gomez Haider is not in the office today.

## 2019-01-16 NOTE — TELEPHONE ENCOUNTER
Called pt's daughter, Radha Hardy, back. She stated that she wants to do a memory assessment, talk about pt getting life line and also talk about getting bars installed in pt's bathroom but does not want pt to know about it. I spoke to RS about this and she stated that Radha Hardy should be up front with mother so there is no secrecy with pt's health. I advised daughter and they will be seeing Austin Goncalves on 1/23/18.     Future Appointments  Date Time Provider Carolina Alves   1/23/2019 8:45 AM MD CHIO Hillman [facility]   1/28/2019 2:00 PM MD Renny Manrique [facility]   11/6/2019 8:00 AM SCHEDULE, [facility] AWDANY BARROSO [facility], [place]

## 2019-01-23 ENCOUNTER — OFFICE VISIT (OUTPATIENT)
Dept: FAMILY MEDICINE CLINIC | Age: 79
End: 2019-01-23
Payer: MEDICARE

## 2019-01-23 VITALS
OXYGEN SATURATION: 97 % | WEIGHT: 143 LBS | RESPIRATION RATE: 16 BRPM | BODY MASS INDEX: 23.82 KG/M2 | DIASTOLIC BLOOD PRESSURE: 84 MMHG | HEIGHT: 65 IN | SYSTOLIC BLOOD PRESSURE: 146 MMHG | HEART RATE: 70 BPM

## 2019-01-23 DIAGNOSIS — G31.84 MCI (MILD COGNITIVE IMPAIRMENT): Primary | ICD-10-CM

## 2019-01-23 DIAGNOSIS — E78.2 MIXED HYPERLIPIDEMIA: ICD-10-CM

## 2019-01-23 DIAGNOSIS — Z23 NEEDS FLU SHOT: ICD-10-CM

## 2019-01-23 LAB
A/G RATIO: 1.7 (ref 1.1–2.2)
ALBUMIN SERPL-MCNC: 4.5 G/DL (ref 3.4–5)
ALP BLD-CCNC: 65 U/L (ref 40–129)
ALT SERPL-CCNC: 11 U/L (ref 10–40)
ANION GAP SERPL CALCULATED.3IONS-SCNC: 12 MMOL/L (ref 3–16)
AST SERPL-CCNC: 15 U/L (ref 15–37)
BILIRUB SERPL-MCNC: 0.9 MG/DL (ref 0–1)
BUN BLDV-MCNC: 16 MG/DL (ref 7–20)
CALCIUM SERPL-MCNC: 9.9 MG/DL (ref 8.3–10.6)
CHLORIDE BLD-SCNC: 102 MMOL/L (ref 99–110)
CHOLESTEROL, TOTAL: 264 MG/DL (ref 0–199)
CO2: 29 MMOL/L (ref 21–32)
CREAT SERPL-MCNC: 1 MG/DL (ref 0.6–1.2)
GFR AFRICAN AMERICAN: >60
GFR NON-AFRICAN AMERICAN: 54
GLOBULIN: 2.7 G/DL
GLUCOSE BLD-MCNC: 103 MG/DL (ref 70–99)
HDLC SERPL-MCNC: 46 MG/DL (ref 40–60)
LDL CHOLESTEROL CALCULATED: 192 MG/DL
POTASSIUM SERPL-SCNC: 4.6 MMOL/L (ref 3.5–5.1)
SODIUM BLD-SCNC: 143 MMOL/L (ref 136–145)
TOTAL PROTEIN: 7.2 G/DL (ref 6.4–8.2)
TRIGL SERPL-MCNC: 130 MG/DL (ref 0–150)
TSH REFLEX FT4: 0.7 UIU/ML (ref 0.27–4.2)
VITAMIN B-12: 371 PG/ML (ref 211–911)
VLDLC SERPL CALC-MCNC: 26 MG/DL

## 2019-01-23 PROCEDURE — 36415 COLL VENOUS BLD VENIPUNCTURE: CPT | Performed by: FAMILY MEDICINE

## 2019-01-23 PROCEDURE — 90662 IIV NO PRSV INCREASED AG IM: CPT | Performed by: FAMILY MEDICINE

## 2019-01-23 PROCEDURE — 99214 OFFICE O/P EST MOD 30 MIN: CPT | Performed by: FAMILY MEDICINE

## 2019-01-23 PROCEDURE — G0008 ADMIN INFLUENZA VIRUS VAC: HCPCS | Performed by: FAMILY MEDICINE

## 2019-01-24 ENCOUNTER — TELEPHONE (OUTPATIENT)
Dept: FAMILY MEDICINE CLINIC | Age: 79
End: 2019-01-24

## 2019-01-24 NOTE — TELEPHONE ENCOUNTER
----- Message from Sander Tan MD sent at 1/24/2019  7:57 AM EST -----  B12, kid, tito and thyr labs nl. Chol has increased a fair amt, and b/c she hasn't gained wt and she is very active, I suspect this increase is due to genetics. Lowering chol has been linked with decreasing risk of dementia, and therefore I'd like to rx a chol med. Pls rx lipitor 10 1qd 30+5 or 90+3, her choice. I'll leilani chol at 6 mo visit.

## 2019-01-28 ENCOUNTER — OFFICE VISIT (OUTPATIENT)
Dept: CARDIOLOGY CLINIC | Age: 79
End: 2019-01-28
Payer: MEDICARE

## 2019-01-28 VITALS
WEIGHT: 145 LBS | HEIGHT: 65 IN | DIASTOLIC BLOOD PRESSURE: 62 MMHG | BODY MASS INDEX: 24.16 KG/M2 | HEART RATE: 51 BPM | SYSTOLIC BLOOD PRESSURE: 134 MMHG | OXYGEN SATURATION: 98 %

## 2019-01-28 DIAGNOSIS — I48.4 ATYPICAL ATRIAL FLUTTER (HCC): Primary | ICD-10-CM

## 2019-01-28 PROCEDURE — 93000 ELECTROCARDIOGRAM COMPLETE: CPT | Performed by: INTERNAL MEDICINE

## 2019-01-28 PROCEDURE — 99214 OFFICE O/P EST MOD 30 MIN: CPT | Performed by: INTERNAL MEDICINE

## 2019-01-28 RX ORDER — ATORVASTATIN CALCIUM 10 MG/1
10 TABLET, FILM COATED ORAL DAILY
COMMUNITY
End: 2019-01-28 | Stop reason: SDUPTHER

## 2019-01-28 RX ORDER — ATORVASTATIN CALCIUM 10 MG/1
10 TABLET, FILM COATED ORAL DAILY
Qty: 90 TABLET | Refills: 3 | Status: SHIPPED | OUTPATIENT
Start: 2019-01-28 | End: 2020-04-17 | Stop reason: SDUPTHER

## 2019-01-28 RX ORDER — DILTIAZEM HYDROCHLORIDE 180 MG/1
180 CAPSULE, COATED, EXTENDED RELEASE ORAL DAILY
Qty: 90 CAPSULE | Refills: 3 | Status: SHIPPED | OUTPATIENT
Start: 2019-01-28 | End: 2020-01-10

## 2019-03-04 ENCOUNTER — TELEPHONE (OUTPATIENT)
Dept: FAMILY MEDICINE CLINIC | Age: 79
End: 2019-03-04

## 2019-03-05 RX ORDER — HYDROXYZINE HYDROCHLORIDE 25 MG/1
25 TABLET, FILM COATED ORAL 2 TIMES DAILY PRN
COMMUNITY
End: 2019-03-05 | Stop reason: CLARIF

## 2019-03-05 RX ORDER — HYDROXYZINE HYDROCHLORIDE 25 MG/1
25 TABLET, FILM COATED ORAL 2 TIMES DAILY PRN
Qty: 14 TABLET | Refills: 0 | Status: SHIPPED | OUTPATIENT
Start: 2019-03-05 | End: 2019-03-11 | Stop reason: SDUPTHER

## 2019-03-11 ENCOUNTER — TELEPHONE (OUTPATIENT)
Dept: CARDIOLOGY CLINIC | Age: 79
End: 2019-03-11

## 2019-03-11 ENCOUNTER — OFFICE VISIT (OUTPATIENT)
Dept: FAMILY MEDICINE CLINIC | Age: 79
End: 2019-03-11
Payer: MEDICARE

## 2019-03-11 VITALS
SYSTOLIC BLOOD PRESSURE: 128 MMHG | BODY MASS INDEX: 23.49 KG/M2 | HEART RATE: 62 BPM | WEIGHT: 141 LBS | RESPIRATION RATE: 16 BRPM | HEIGHT: 65 IN | OXYGEN SATURATION: 98 % | DIASTOLIC BLOOD PRESSURE: 78 MMHG

## 2019-03-11 DIAGNOSIS — I48.4 ATYPICAL ATRIAL FLUTTER (HCC): ICD-10-CM

## 2019-03-11 DIAGNOSIS — F41.9 ANXIETY: Primary | ICD-10-CM

## 2019-03-11 DIAGNOSIS — E78.2 MIXED HYPERLIPIDEMIA: ICD-10-CM

## 2019-03-11 DIAGNOSIS — M25.531 PAIN IN BOTH WRISTS: ICD-10-CM

## 2019-03-11 DIAGNOSIS — M25.532 PAIN IN BOTH WRISTS: ICD-10-CM

## 2019-03-11 PROCEDURE — 99214 OFFICE O/P EST MOD 30 MIN: CPT | Performed by: FAMILY MEDICINE

## 2019-03-11 RX ORDER — HYDROXYZINE HYDROCHLORIDE 25 MG/1
TABLET, FILM COATED ORAL
Qty: 30 TABLET | Refills: 1 | Status: SHIPPED | OUTPATIENT
Start: 2019-03-11 | End: 2020-04-21 | Stop reason: SDUPTHER

## 2019-04-15 ENCOUNTER — TELEPHONE (OUTPATIENT)
Dept: FAMILY MEDICINE CLINIC | Age: 79
End: 2019-04-15

## 2019-04-15 NOTE — TELEPHONE ENCOUNTER
Patient called and wanted to know if our office gets samples of Eliquis. Patient states that she used to get samples from her heart doctor but he is no longer getting them. Patient states that the cost of the Eliquis goes up in November and December and she would get samples through out the year to hold on to until she needed them in November and December.

## 2019-04-15 NOTE — TELEPHONE ENCOUNTER
WE do not get samples. She can reach out to the patient care program through the  for possible price reduction.

## 2019-04-17 ENCOUNTER — TELEPHONE (OUTPATIENT)
Dept: CARDIOLOGY CLINIC | Age: 79
End: 2019-04-17

## 2019-05-23 ENCOUNTER — HOSPITAL ENCOUNTER (OUTPATIENT)
Dept: WOMENS IMAGING | Age: 79
Discharge: HOME | End: 2019-05-23
Payer: MEDICARE

## 2019-05-23 DIAGNOSIS — Z12.31 VISIT FOR SCREENING MAMMOGRAM: ICD-10-CM

## 2019-05-23 PROCEDURE — 77067 SCR MAMMO BI INCL CAD: CPT

## 2019-07-21 NOTE — PROGRESS NOTES
chest tightness, shortness of breath and wheezing. Cardiovascular: Negative for chest pain, palpitations and leg swelling. Gastrointestinal: Negative for abdominal distention, abdominal pain, blood in stool, constipation, diarrhea, nausea and vomiting. Genitourinary: Negative for difficulty urinating and dysuria. Musculoskeletal: Negative for arthralgias. Skin: Negative for color change, pallor and rash. Neurological: Negative for dizziness, syncope, weakness, light-headedness and headaches. Hematological: Negative. Psychiatric/Behavioral: Negative for dysphoric mood and sleep disturbance. The patient is nervous/anxious. Physical Exam   Constitutional: She is oriented to person, place, and time. She appears well-developed and well-nourished. HENT:   Head: Normocephalic and atraumatic. Eyes: Conjunctivae and EOM are normal. No scleral icterus. Cardiovascular: Normal rate and normal heart sounds. Slightly irreg   Pulmonary/Chest: Effort normal and breath sounds normal.   Musculoskeletal: Normal range of motion. Neurological: She is alert and oriented to person, place, and time. Skin: Skin is warm and dry. Psychiatric: She has a normal mood and affect. Her behavior is normal. Judgment and thought content normal.         /76 (Site: Right Upper Arm, Position: Sitting, Cuff Size: Medium Adult)   Pulse 68   Resp 16   Ht 5' 5\" (1.651 m)   Wt 148 lb (67.1 kg)   SpO2 98%   BMI 24.63 kg/m²     Assessment/Plan:    Camila Tuttle was seen today for 6 month follow-up. Diagnoses and all orders for this visit:    Atypical atrial flutter ([address]   Doing well, discussed with pt that meds will likely be lifelong    Anxiety   Hydroxyzine prn has been helpful. Pt hopes to cont to travel, though her travel partner friend is no longer able to travel. Pt feels strong and is hoping to find new travel companions or cont to travel with daughters.     Mixed hyperlipidemia   OK to cont 1/2 lipitor

## 2019-07-22 ENCOUNTER — OFFICE VISIT (OUTPATIENT)
Dept: FAMILY MEDICINE CLINIC | Age: 79
End: 2019-07-22
Payer: MEDICARE

## 2019-07-22 VITALS
RESPIRATION RATE: 16 BRPM | HEART RATE: 68 BPM | OXYGEN SATURATION: 98 % | SYSTOLIC BLOOD PRESSURE: 138 MMHG | DIASTOLIC BLOOD PRESSURE: 76 MMHG | BODY MASS INDEX: 24.66 KG/M2 | WEIGHT: 148 LBS | HEIGHT: 65 IN

## 2019-07-22 DIAGNOSIS — I48.4 ATYPICAL ATRIAL FLUTTER (HCC): Primary | ICD-10-CM

## 2019-07-22 DIAGNOSIS — F41.9 ANXIETY: ICD-10-CM

## 2019-07-22 DIAGNOSIS — E78.2 MIXED HYPERLIPIDEMIA: ICD-10-CM

## 2019-07-22 PROCEDURE — 99214 OFFICE O/P EST MOD 30 MIN: CPT | Performed by: FAMILY MEDICINE

## 2019-07-22 ASSESSMENT — ENCOUNTER SYMPTOMS
NAUSEA: 0
COLOR CHANGE: 0
CONSTIPATION: 0
CHEST TIGHTNESS: 0
EYES NEGATIVE: 1
BLOOD IN STOOL: 0
VOMITING: 0
WHEEZING: 0
DIARRHEA: 0
ABDOMINAL DISTENTION: 0
SHORTNESS OF BREATH: 0
COUGH: 0
ABDOMINAL PAIN: 0

## 2019-07-22 ASSESSMENT — PATIENT HEALTH QUESTIONNAIRE - PHQ9
1. LITTLE INTEREST OR PLEASURE IN DOING THINGS: 0
SUM OF ALL RESPONSES TO PHQ QUESTIONS 1-9: 0
SUM OF ALL RESPONSES TO PHQ QUESTIONS 1-9: 0
SUM OF ALL RESPONSES TO PHQ9 QUESTIONS 1 & 2: 0
2. FEELING DOWN, DEPRESSED OR HOPELESS: 0

## 2019-08-19 ENCOUNTER — TELEPHONE (OUTPATIENT)
Dept: CARDIOLOGY CLINIC | Age: 79
End: 2019-08-19

## 2019-09-19 ENCOUNTER — OFFICE VISIT (OUTPATIENT)
Dept: FAMILY MEDICINE CLINIC | Age: 79
End: 2019-09-19
Payer: MEDICARE

## 2019-09-19 VITALS
BODY MASS INDEX: 24.66 KG/M2 | WEIGHT: 148 LBS | DIASTOLIC BLOOD PRESSURE: 68 MMHG | OXYGEN SATURATION: 98 % | HEIGHT: 65 IN | RESPIRATION RATE: 16 BRPM | SYSTOLIC BLOOD PRESSURE: 122 MMHG | HEART RATE: 64 BPM

## 2019-09-19 DIAGNOSIS — M77.8 WRIST TENDONITIS: Primary | ICD-10-CM

## 2019-09-19 DIAGNOSIS — Z23 NEEDS FLU SHOT: ICD-10-CM

## 2019-09-19 DIAGNOSIS — G56.03 BILATERAL CARPAL TUNNEL SYNDROME: ICD-10-CM

## 2019-09-19 PROCEDURE — G0008 ADMIN INFLUENZA VIRUS VAC: HCPCS | Performed by: FAMILY MEDICINE

## 2019-09-19 PROCEDURE — 99213 OFFICE O/P EST LOW 20 MIN: CPT | Performed by: FAMILY MEDICINE

## 2019-09-19 PROCEDURE — 90653 IIV ADJUVANT VACCINE IM: CPT | Performed by: FAMILY MEDICINE

## 2019-09-20 ENCOUNTER — TELEPHONE (OUTPATIENT)
Dept: FAMILY MEDICINE CLINIC | Age: 79
End: 2019-09-20

## 2019-09-20 NOTE — TELEPHONE ENCOUNTER
Had shingles vaccine yesterday at [facility] in [place]. Will bring in proof of vaccination next visit.

## 2019-11-06 ENCOUNTER — OFFICE VISIT (OUTPATIENT)
Dept: FAMILY MEDICINE CLINIC | Age: 79
End: 2019-11-06
Payer: MEDICARE

## 2019-11-06 VITALS
RESPIRATION RATE: 14 BRPM | HEIGHT: 64 IN | DIASTOLIC BLOOD PRESSURE: 82 MMHG | BODY MASS INDEX: 25.61 KG/M2 | WEIGHT: 150 LBS | SYSTOLIC BLOOD PRESSURE: 124 MMHG | HEART RATE: 68 BPM | OXYGEN SATURATION: 97 %

## 2019-11-06 DIAGNOSIS — Z00.00 ROUTINE GENERAL MEDICAL EXAMINATION AT A HEALTH CARE FACILITY: Primary | ICD-10-CM

## 2019-11-06 PROCEDURE — G0439 PPPS, SUBSEQ VISIT: HCPCS | Performed by: FAMILY MEDICINE

## 2019-11-06 ASSESSMENT — LIFESTYLE VARIABLES: HOW OFTEN DO YOU HAVE A DRINK CONTAINING ALCOHOL: 0

## 2019-11-06 ASSESSMENT — PATIENT HEALTH QUESTIONNAIRE - PHQ9
SUM OF ALL RESPONSES TO PHQ QUESTIONS 1-9: 0
SUM OF ALL RESPONSES TO PHQ QUESTIONS 1-9: 0

## 2019-11-11 ENCOUNTER — OFFICE VISIT (OUTPATIENT)
Dept: FAMILY MEDICINE CLINIC | Age: 79
End: 2019-11-11
Payer: MEDICARE

## 2019-11-11 VITALS
WEIGHT: 149.2 LBS | BODY MASS INDEX: 25.47 KG/M2 | OXYGEN SATURATION: 98 % | SYSTOLIC BLOOD PRESSURE: 138 MMHG | RESPIRATION RATE: 16 BRPM | DIASTOLIC BLOOD PRESSURE: 84 MMHG | HEIGHT: 64 IN | HEART RATE: 65 BPM

## 2019-11-11 DIAGNOSIS — R73.01 IFG (IMPAIRED FASTING GLUCOSE): ICD-10-CM

## 2019-11-11 DIAGNOSIS — M53.3 SACROILIAC DYSFUNCTION: ICD-10-CM

## 2019-11-11 DIAGNOSIS — E78.2 MIXED HYPERLIPIDEMIA: Primary | ICD-10-CM

## 2019-11-11 LAB
CHOLESTEROL, TOTAL: 193 MG/DL (ref 0–199)
HDLC SERPL-MCNC: 44 MG/DL (ref 40–60)
LDL CHOLESTEROL CALCULATED: 118 MG/DL
TRIGL SERPL-MCNC: 156 MG/DL (ref 0–150)
VLDLC SERPL CALC-MCNC: 31 MG/DL

## 2019-11-11 PROCEDURE — 99213 OFFICE O/P EST LOW 20 MIN: CPT | Performed by: FAMILY MEDICINE

## 2019-11-11 PROCEDURE — 36415 COLL VENOUS BLD VENIPUNCTURE: CPT | Performed by: FAMILY MEDICINE

## 2019-11-11 ASSESSMENT — ENCOUNTER SYMPTOMS
SHORTNESS OF BREATH: 0
ABDOMINAL PAIN: 0
BACK PAIN: 1

## 2019-11-12 ENCOUNTER — TELEPHONE (OUTPATIENT)
Dept: FAMILY MEDICINE CLINIC | Age: 79
End: 2019-11-12

## 2019-11-12 LAB
ESTIMATED AVERAGE GLUCOSE: 108.3 MG/DL
HBA1C MFR BLD: 5.4 %

## 2019-11-18 ENCOUNTER — TELEPHONE (OUTPATIENT)
Dept: FAMILY MEDICINE CLINIC | Age: 79
End: 2019-11-18

## 2019-12-20 ENCOUNTER — TELEPHONE (OUTPATIENT)
Dept: CARDIOLOGY CLINIC | Age: 79
End: 2019-12-20

## 2020-01-10 RX ORDER — DILTIAZEM HYDROCHLORIDE 180 MG/1
CAPSULE, EXTENDED RELEASE ORAL
Qty: 90 CAPSULE | Refills: 0 | Status: SHIPPED | OUTPATIENT
Start: 2020-01-10 | End: 2020-04-20 | Stop reason: SDUPTHER

## 2020-01-10 NOTE — TELEPHONE ENCOUNTER
1/28/2019 RPS  RECOMMENDATIONS:  1. I recommend you remain on anticoagulation as you have been asymptomatic in the past with your atrial flutter. 2. If you develop symptoms and are found to be in atrial flutter, I recommend a repeat ablation. 3. Follow up with Ced Payne CNP in 1 year.      1/28/2019 ekg      1/28/2020 upcoming [facility] visit

## 2020-01-27 NOTE — PROGRESS NOTES
Prashanth Rashid MD   metoprolol tartrate (LOPRESSOR) 25 MG tablet TAKE ONE TABLET BY MOUTH TWICE A DAY 1/28/19  Yes Sander Trent MD   calcium-vitamin D (Miriam Gila) 500-200 MG-UNIT per tablet Take 1 tablet by mouth 2 times daily 1/14/16  Yes Tal Schroeder MD   hydrOXYzine (ATARAX) 25 MG tablet 1/2 - 1 po tid prn anxiety  Patient not taking: Reported on 1/28/2020 3/11/19   Benjamin Villagran MD       Allergies:  Lisinopril    Social History:   reports that she has never smoked. She has never used smokeless tobacco. She reports that she does not drink alcohol or use drugs. Family History: family history includes Cancer in her father and mother; High Cholesterol in her brother; Other in her mother; Stroke in her brother. Review of Systems   Constitutional: Negative. HENT: Negative. Eyes: Negative. Respiratory: Negative. Cardiovascular: see HPI   Gastrointestinal: Negative. Genitourinary: Negative. Musculoskeletal: Negative. Skin: Negative. Neurological: Negative. Hematological: Negative. Psychiatric/Behavioral: Negative. PHYSICAL EXAM:    Vital signs:    /78   Pulse 56   Ht 5' 4\" (1.626 m)   Wt 147 lb (66.7 kg)   BMI 25.23 kg/m²      Constitutional and general appearance: alert, cooperative, no distress and appears stated age  HEENT: PERRL, no cervical lymphadenopathy. No masses palpable. Normal oral mucosa  Respiratory:  · Normal excursion and expansion without use of accessory muscles  · Resp auscultation: Normal breath sounds without dullness or wheezing  Cardiovascular:  · The apical impulse is not displaced  · Gr 2/6 systolic murmur.  Regular S1 and S2.  · Jugular venous pulsation Normal  · The carotid upstroke is normal in amplitude and contour without delay or bruit  · Peripheral pulses are symmetrical and full   Abdomen:  · No masses or tenderness  · Bowel sounds present  Extremities:  ·  No cyanosis or clubbing  ·  No lower extremity edema  ·  Skin: warm and dry  Neurological:  · Alert and oriented  · Moves all extremities well  · No abnormalities of mood, affect, memory, mentation, or behavior are noted    DATA:    ECG 1/28/2020:  SB HR 56    Echo 11/29/2016:  Normal left ventricle size with mild concentric left ventricular hypertrophy.   Normal systolic function with an estimated ejection fraction of 55%.   No regional wall motion abnormalities are seen.   Elevated left ventricular diastolic filling pressure ( E/e' 14.5 ).  The left atrium is severely dilated.   Moderate posterior mitral annular calcification is present.   Moderate mitral regurgitation.   Aortic valve appears sclerotic but opens adequately. Echo 1/27/2016:  -Normal left ventricle size, wall thickness and systolic function with an estimated ejection fraction of 55%.   -No regional wall motion abnormalities are seen.   -The left atrium is severly dilated.   -The right atrium is mildly dilated.   -Small ostium primum atrial septal defect with left-to-right shunt was visualized by color flow.   -Mitral annular calcification with restricted movement of the posterior leaflet.   -The maximum mitral valve pressure gradient is 12 mmHg and the mean pressure gradient is 3 mmHg.   -Mild mitral stenosis.   -At least moderate mitral regurgitation.   -Aortic valve appears sclerotic but opens adequately.   -Trace pulmonic and aortic valve regurgitation.   -Mild tricuspid regurgitation.   -Systolic pulmonary artery pressure (SPAP) is normal and estimated at 33mmHg (RA pressure 3 mmHg). ADAM 1/8/2016:  Normal left ventricle size and wall thickness and left ventricular systolic   function with an estimated ejection fraction of 55%.   Moderate to severe mitral regurgitation is present.   The left atrium is dilated.   A bubble study was performed and showed no evidence of shunting. CARDIOLOGY LABS:   CBC: No results for input(s): WBC, HGB, HCT, PLT in the last 72 hours.   BMP: No results for input(s): NA, K, CO2,

## 2020-01-28 ENCOUNTER — OFFICE VISIT (OUTPATIENT)
Dept: CARDIOLOGY CLINIC | Age: 80
End: 2020-01-28
Payer: MEDICARE

## 2020-01-28 VITALS
HEIGHT: 64 IN | HEART RATE: 56 BPM | WEIGHT: 147 LBS | DIASTOLIC BLOOD PRESSURE: 78 MMHG | SYSTOLIC BLOOD PRESSURE: 112 MMHG | BODY MASS INDEX: 25.1 KG/M2

## 2020-01-28 PROCEDURE — 93000 ELECTROCARDIOGRAM COMPLETE: CPT | Performed by: NURSE PRACTITIONER

## 2020-01-28 PROCEDURE — 99213 OFFICE O/P EST LOW 20 MIN: CPT | Performed by: NURSE PRACTITIONER

## 2020-01-28 NOTE — LETTER
[address] [facility]  Phone: [phone]  Fax: [phone]     Genesis Gaines, APRN - CNP     1/28/2020     [facility], [address] [place] [address]     Patient: Bindu Conklin   MR Number: [id number]   YOB: 1940   Date of Visit: 1/28/2020     Dear Dr. Florina Lopez     Today I saw our mutual patient named above. Below are the relevant portions of my assessment and plan of care. If you have questions, please do not hesitate to call me. I look forward to following [facility] along with you. [facility]   Electrophysiology  Note              Date:  January 28, 2020  Patient name: Bindu Conklin  YOB: 1940    Primary Care physician: [facility][1] MD RIO    HISTORY OF PRESENT ILLNESS: The patient is a 78 y.o.  female with a history of atrial flutter, AT, mitral regurgitation, and mild mitral stenosis. She was admitted in 1/2016 with atrial flutter with RVR but was asymptomatic with the arrhythmia. ADAM in 1/2016 showed moderate to severe MR. In 1/2016 she had RFCA of a left sided atypical atrial flutter and left sided AT, originating from right superior pulmonary vein with Dr. Darlin Hannah. She returned in 1/2016 and her EKG showed atrial flutter. Echo in 1/2016 showed an EF of 55%. Repeat echo in 11/2016 showed an EF of 55% and moderate MR. She has been in sinus rhythm at subsequent visits. Today she is being seen for atrial flutter. EKG shows sinus natividad with a HR of 56. She is feeling well and denies chest pain, palpitations, shortness of breath, and dizziness. Past Medical History:   has a past medical history of Atrial flutter ([address] Atrial tachycardia ([address] CTS (carpal tunnel syndrome), HLD (hyperlipidemia), Hypertension, Mitral regurgitation, Mixed hyperlipidemia, Osteopenia, and Shingles.     Past Surgical History:   has a past surgical history that includes knee HEENT: PERRL, no cervical lymphadenopathy. No masses palpable. Normal oral mucosa  Respiratory:  · Normal excursion and expansion without use of accessory muscles  · Resp auscultation: Normal breath sounds without dullness or wheezing  Cardiovascular:  · The apical impulse is not displaced  · Gr 2/6 systolic murmur. Regular S1 and S2.  · Jugular venous pulsation Normal  · The carotid upstroke is normal in amplitude and contour without delay or bruit  · Peripheral pulses are symmetrical and full   Abdomen:  · No masses or tenderness  · Bowel sounds present  Extremities:  ·  No cyanosis or clubbing  ·  No lower extremity edema  ·  Skin: warm and dry  Neurological:  · Alert and oriented  · Moves all extremities well  · No abnormalities of mood, affect, memory, mentation, or behavior are noted    DATA:    ECG 1/28/2020:  SB HR 56    Echo 11/29/2016:  Normal left ventricle size with mild concentric left ventricular hypertrophy.   Normal systolic function with an estimated ejection fraction of 55%.   No regional wall motion abnormalities are seen.   Elevated left ventricular diastolic filling pressure ( E/e' 14.5 ).  The left atrium is severely dilated.   Moderate posterior mitral annular calcification is present.   Moderate mitral regurgitation.   Aortic valve appears sclerotic but opens adequately. Echo 1/27/2016:  -Normal left ventricle size, wall thickness and systolic function with an estimated ejection fraction of 55%.   -No regional wall motion abnormalities are seen.   -The left atrium is severly dilated.   -The right atrium is mildly dilated.   -Small ostium primum atrial septal defect with left-to-right shunt was visualized by color flow.   -Mitral annular calcification with restricted movement of the posterior leaflet.   -The maximum mitral valve pressure gradient is 12 mmHg and the mean pressure gradient is 3 mmHg.   -Mild mitral stenosis.   -At least moderate mitral regurgitation.  -Aortic valve appears sclerotic but opens adequately.   -Trace pulmonic and aortic valve regurgitation.   -Mild tricuspid regurgitation.   -Systolic pulmonary artery pressure (SPAP) is normal and estimated at 33mmHg (RA pressure 3 mmHg). ADAM 1/8/2016:  Normal left ventricle size and wall thickness and left ventricular systolic   function with an estimated ejection fraction of 55%.   Moderate to severe mitral regurgitation is present.   The left atrium is dilated.   A bubble study was performed and showed no evidence of shunting. CARDIOLOGY LABS:   CBC: No results for input(s): WBC, HGB, HCT, PLT in the last 72 hours. BMP: No results for input(s): NA, K, CO2, BUN, CREATININE, LABGLOM, GLUCOSE in the last 72 hours. PT/INR: No results for input(s): PROTIME, INR in the last 72 hours. APTT:No results for input(s): APTT in the last 72 hours. FASTING LIPID PANEL:  Lab Results   Component Value Date    HDL 44 11/11/2019    LDLCALC 118 11/11/2019    TRIG 156 11/11/2019     LIVER PROFILE:No results for input(s): AST, ALT, ALB in the last 72 hours. Assessment:   1. Atypical left sided atrial flutter left sided atrial tachycardia: stable    -s/p ablation both 1/13/2016 with recurrence on EKG 1/22/2016    -anticoagulation has continued due to lack of symptoms with arrhythmia previously    -[id number] score 3 (age and gender)  2. Sinus bradycardia: asymptomatic  3. Mitral regurgitation: moderate on echo   4. Mitral stenosis: mild on echo 1/2016  5. HLD: on statin, PCP managing     Plan:   1. Continue Cardizem, metoprolol, and Eliquis   2. Annual BMP and CBC (due now, ordered)  3.  Follow up in one year     [address]  [phone]      Sincerely,      Kasia Sharp, APRN - CNP

## 2020-01-29 ENCOUNTER — HOSPITAL ENCOUNTER (OUTPATIENT)
Age: 80
Discharge: HOME | End: 2020-01-29
Payer: MEDICARE

## 2020-01-29 LAB
ANION GAP SERPL CALCULATED.3IONS-SCNC: 12 MMOL/L (ref 3–16)
BASOPHILS ABSOLUTE: 0.1 K/UL (ref 0–0.2)
BASOPHILS RELATIVE PERCENT: 1 %
BUN BLDV-MCNC: 17 MG/DL (ref 7–20)
CALCIUM SERPL-MCNC: 9.9 MG/DL (ref 8.3–10.6)
CHLORIDE BLD-SCNC: 99 MMOL/L (ref 99–110)
CO2: 28 MMOL/L (ref 21–32)
CREAT SERPL-MCNC: 0.9 MG/DL (ref 0.6–1.2)
EOSINOPHILS ABSOLUTE: 0.1 K/UL (ref 0–0.6)
EOSINOPHILS RELATIVE PERCENT: 1.9 %
GFR AFRICAN AMERICAN: >60
GFR NON-AFRICAN AMERICAN: >60
GLUCOSE BLD-MCNC: 92 MG/DL (ref 70–99)
HCT VFR BLD CALC: 42 % (ref 36–48)
HEMOGLOBIN: 14 G/DL (ref 12–16)
LYMPHOCYTES ABSOLUTE: 1.2 K/UL (ref 1–5.1)
LYMPHOCYTES RELATIVE PERCENT: 21.5 %
MCH RBC QN AUTO: 31.3 PG (ref 26–34)
MCHC RBC AUTO-ENTMCNC: 33.5 G/DL (ref 31–36)
MCV RBC AUTO: 93.6 FL (ref 80–100)
MONOCYTES ABSOLUTE: 0.4 K/UL (ref 0–1.3)
MONOCYTES RELATIVE PERCENT: 7.8 %
NEUTROPHILS ABSOLUTE: 3.7 K/UL (ref 1.7–7.7)
NEUTROPHILS RELATIVE PERCENT: 67.8 %
PDW BLD-RTO: 12.6 % (ref 12.4–15.4)
PLATELET # BLD: 279 K/UL (ref 135–450)
PMV BLD AUTO: 9 FL (ref 5–10.5)
POTASSIUM SERPL-SCNC: 4.5 MMOL/L (ref 3.5–5.1)
RBC # BLD: 4.48 M/UL (ref 4–5.2)
SODIUM BLD-SCNC: 139 MMOL/L (ref 136–145)
WBC # BLD: 5.5 K/UL (ref 4–11)

## 2020-01-29 PROCEDURE — 36415 COLL VENOUS BLD VENIPUNCTURE: CPT

## 2020-01-29 PROCEDURE — 85025 COMPLETE CBC W/AUTO DIFF WBC: CPT

## 2020-01-29 PROCEDURE — 80048 BASIC METABOLIC PNL TOTAL CA: CPT

## 2020-02-18 ENCOUNTER — TELEPHONE (OUTPATIENT)
Dept: CARDIOLOGY CLINIC | Age: 80
End: 2020-02-18

## 2020-02-28 NOTE — TELEPHONE ENCOUNTER
1/28/2020 NPBB  Plan:   1. Continue Cardizem, metoprolol, and Eliquis   2. Annual BMP and CBC (due now, ordered)  3.  Follow up in one year

## 2020-04-10 ENCOUNTER — TELEPHONE (OUTPATIENT)
Dept: CARDIOLOGY CLINIC | Age: 80
End: 2020-04-10

## 2020-04-10 NOTE — TELEPHONE ENCOUNTER
Spoke with Carlos Eduardo, let her know that at this time we do not have samples in the office due to the current health situation. Pt states that it ok, she does not need a refill sent to pharmacy she has some refills at pharmacy.

## 2020-04-17 RX ORDER — ATORVASTATIN CALCIUM 10 MG/1
10 TABLET, FILM COATED ORAL DAILY
Qty: 90 TABLET | Refills: 1 | Status: SHIPPED | OUTPATIENT
Start: 2020-04-17 | End: 2020-11-11 | Stop reason: SDUPTHER

## 2020-04-20 RX ORDER — DILTIAZEM HYDROCHLORIDE 180 MG/1
CAPSULE, EXTENDED RELEASE ORAL
Qty: 90 CAPSULE | Refills: 2 | Status: SHIPPED | OUTPATIENT
Start: 2020-04-20 | End: 2020-11-11 | Stop reason: SDUPTHER

## 2020-04-21 ENCOUNTER — TELEPHONE (OUTPATIENT)
Dept: FAMILY MEDICINE CLINIC | Age: 80
End: 2020-04-21

## 2020-04-21 RX ORDER — HYDROXYZINE HYDROCHLORIDE 25 MG/1
TABLET, FILM COATED ORAL
Qty: 30 TABLET | Refills: 1 | Status: SHIPPED | OUTPATIENT
Start: 2020-04-21 | End: 2020-10-09 | Stop reason: SDUPTHER

## 2020-04-22 ENCOUNTER — TELEPHONE (OUTPATIENT)
Dept: FAMILY MEDICINE CLINIC | Age: 80
End: 2020-04-22

## 2020-05-14 ENCOUNTER — VIRTUAL VISIT (OUTPATIENT)
Dept: FAMILY MEDICINE CLINIC | Age: 80
End: 2020-05-14
Payer: MEDICARE

## 2020-05-14 VITALS — HEIGHT: 66 IN | WEIGHT: 145 LBS | BODY MASS INDEX: 23.3 KG/M2

## 2020-05-14 PROCEDURE — 99213 OFFICE O/P EST LOW 20 MIN: CPT | Performed by: FAMILY MEDICINE

## 2020-05-14 ASSESSMENT — ENCOUNTER SYMPTOMS
BACK PAIN: 1
SHORTNESS OF BREATH: 0

## 2020-05-14 NOTE — PROGRESS NOTES
EXAMINATION:  [ INSTRUCTIONS:  \"[x]\" Indicates a positive item  \"[]\" Indicates a negative item  -- DELETE ALL ITEMS NOT EXAMINED]      Constitutional: [x] Appears well-developed and well-nourished [x] No apparent distress      [] Abnormal-   Mental status  [x] Alert and awake  [x] Oriented to person/place/time []Able to follow commands      Eyes:  EOM    [x]  Normal  [] Abnormal-  Sclera  [x]  Normal  [] Abnormal -         Discharge [x]  None visible  [] Abnormal -    HENT:   [x] Normocephalic, atraumatic. [] Abnormal   [] Mouth/Throat: Mucous membranes are moist.     External Ears [x] Normal  [] Abnormal-     Neck: [x] No visualized mass     Pulmonary/Chest: [x] Respiratory effort normal.  [x] No visualized signs of difficulty breathing or respiratory distress        [] Abnormal-      Musculoskeletal:   [] Normal gait with no signs of ataxia         [x] Normal range of motion of neck        [] Abnormal-       Neurological:        [x] No Facial Asymmetry (Cranial nerve 7 motor function) (limited exam to video visit)          [x] No gaze palsy        [] Abnormal-         Skin:        [x] No significant exanthematous lesions or discoloration noted on facial skin         [] Abnormal-            Psychiatric:       [x] Normal Affect [] No Hallucinations        [] Abnormal-     Other pertinent observable physical exam findings-     ASSESSMENT/PLAN:  1. Atypical atrial flutter (HCC)  Doing well, cpm    2. Mixed hyperlipidemia  OK to cont lipitor 10 1/1 qd    3. IFG (impaired fasting glucose)  Cont healthy diet and daily walks      Return in about 6 months (around 11/14/2020) for 6 month follow up, fasting. Tete Reynolds is a 78 y.o. female being evaluated by a Virtual Visit (video visit) encounter to address concerns as mentioned above. A caregiver was present when appropriate.  Due to this being a TeleHealth encounter (During QFRXM-52 public health emergency), evaluation of the following organ systems was limited:

## 2020-05-26 ENCOUNTER — TELEPHONE (OUTPATIENT)
Dept: FAMILY MEDICINE CLINIC | Age: 80
End: 2020-05-26

## 2020-05-26 DIAGNOSIS — I48.4 ATYPICAL ATRIAL FLUTTER (HCC): Primary | ICD-10-CM

## 2020-06-23 ENCOUNTER — HOSPITAL ENCOUNTER (OUTPATIENT)
Dept: WOMENS IMAGING | Age: 80
Discharge: HOME | End: 2020-06-23
Payer: MEDICARE

## 2020-06-23 PROCEDURE — 77067 SCR MAMMO BI INCL CAD: CPT

## 2020-08-25 NOTE — TELEPHONE ENCOUNTER
The patient stated she went to the pharmacy to  Eliquis and it was $300.00. The patient is asking if we have any samples or if you have any advice on how she can get the cost down.      The patient is requesting a call back about what she can do. [phone]*

## 2020-08-26 ENCOUNTER — TELEPHONE (OUTPATIENT)
Dept: CARDIOLOGY CLINIC | Age: 80
End: 2020-08-26

## 2020-08-26 NOTE — TELEPHONE ENCOUNTER
The patient is requesting a call back if Shlomo Dolan finds any new information. The patient stated you can leave a message on her voicemail.      [phone]

## 2020-08-26 NOTE — TELEPHONE ENCOUNTER
Unfortunately patient assistance is not available for those on Medicare. There are co-pay discount for those on private insurance. I will do some research and see if there is any option for the patient.

## 2020-09-11 ENCOUNTER — TELEPHONE (OUTPATIENT)
Dept: FAMILY MEDICINE CLINIC | Age: 80
End: 2020-09-11

## 2020-09-11 NOTE — TELEPHONE ENCOUNTER
----- Message from [employer] sent at 9/11/2020  1:52 PM EDT -----  Subject: Message to Provider    QUESTIONS  Information for Provider? Ramila Ham would like pcp to know she received her   flu shot yesterday   9/10/2020 at [facility]. Thank you.   ---------------------------------------------------------------------------  --------------  CALL BACK INFO  What is the best way for the office to contact you? OK to leave message on   voicemail  Preferred Call Back Phone Number? [phone]  ---------------------------------------------------------------------------  --------------  SCRIPT ANSWERS  Relationship to Patient?  Self

## 2020-10-08 ENCOUNTER — TELEPHONE (OUTPATIENT)
Dept: CARDIOLOGY CLINIC | Age: 80
End: 2020-10-08

## 2020-10-08 ENCOUNTER — TELEPHONE (OUTPATIENT)
Dept: FAMILY MEDICINE CLINIC | Age: 80
End: 2020-10-08

## 2020-10-08 NOTE — TELEPHONE ENCOUNTER
Pt daughter called, needs a prescription of apixaban (ELIQUIS) 5 MG TABS tablet     Taking twice daily.      [facility], [address] [place] [address], [address] [place] [address]     [phone]

## 2020-10-08 NOTE — TELEPHONE ENCOUNTER
Spoke to pt's daughter Melanie Davenport. Eliquis 5 mg BID has been sent to the pharmacy.  Pt will contact her PCP in regards to her HTN. V/U.      TY

## 2020-10-08 NOTE — TELEPHONE ENCOUNTER
Contacted daughter Nicol Lyn and she advised that pt is not having any symptoms currently. They did take BP while on phone with me and it was 171/93, Pulse 59.

## 2020-10-08 NOTE — TELEPHONE ENCOUNTER
If pt is having dizziness, chest pain or severe HA, my sugg is to seek care this evening, either urgent care of ED. If she is not having these sx's, waiting until appt tomorrow is very reasonable.  Thx.

## 2020-10-08 NOTE — TELEPHONE ENCOUNTER
Spoke to pt's daughter Chinmay Hensley. She wanted to let us know that pt's current  BP is 189/94, HR 62. Pt  denies any  CP, SOB, or palps. Pt is currently taking Eliquis 5 mg BID (needs refill), Metoprolol 25 mg BID, and Diltiazem 180 mg daily. Pt said she feels fine, but daughter Chinmay Hensley is concerned with her vitals. Pt's last OV 1/28/20 NPBB. Pt's daughter Chinmay Hensley can be reached at [phone].       TY

## 2020-10-09 ENCOUNTER — OFFICE VISIT (OUTPATIENT)
Dept: FAMILY MEDICINE CLINIC | Age: 80
End: 2020-10-09
Payer: MEDICARE

## 2020-10-09 ENCOUNTER — TELEPHONE (OUTPATIENT)
Dept: ADMINISTRATIVE | Age: 80
End: 2020-10-09

## 2020-10-09 VITALS
SYSTOLIC BLOOD PRESSURE: 160 MMHG | BODY MASS INDEX: 24.05 KG/M2 | TEMPERATURE: 97.9 F | DIASTOLIC BLOOD PRESSURE: 74 MMHG | WEIGHT: 149 LBS | OXYGEN SATURATION: 98 % | HEART RATE: 62 BPM

## 2020-10-09 PROCEDURE — 99214 OFFICE O/P EST MOD 30 MIN: CPT | Performed by: REGISTERED NURSE

## 2020-10-09 RX ORDER — LOSARTAN POTASSIUM 50 MG/1
50 TABLET ORAL DAILY
Qty: 30 TABLET | Refills: 1 | Status: SHIPPED | OUTPATIENT
Start: 2020-10-09 | End: 2020-11-11

## 2020-10-09 RX ORDER — HYDROXYZINE HYDROCHLORIDE 25 MG/1
TABLET, FILM COATED ORAL
Qty: 30 TABLET | Refills: 1 | Status: SHIPPED | OUTPATIENT
Start: 2020-10-09 | End: 2021-01-20

## 2020-10-09 ASSESSMENT — PATIENT HEALTH QUESTIONNAIRE - PHQ9
2. FEELING DOWN, DEPRESSED OR HOPELESS: 0
SUM OF ALL RESPONSES TO PHQ9 QUESTIONS 1 & 2: 0
SUM OF ALL RESPONSES TO PHQ QUESTIONS 1-9: 0
SUM OF ALL RESPONSES TO PHQ QUESTIONS 1-9: 0
1. LITTLE INTEREST OR PLEASURE IN DOING THINGS: 0

## 2020-10-09 ASSESSMENT — ENCOUNTER SYMPTOMS
DIARRHEA: 0
NAUSEA: 0
SHORTNESS OF BREATH: 0
ABDOMINAL PAIN: 0
CONSTIPATION: 0
COUGH: 0
PHOTOPHOBIA: 0

## 2020-10-09 NOTE — PROGRESS NOTES
per tablet Take 1 tablet by mouth 2 times daily 60 tablet 1     No current facility-administered medications for this visit. Patient's past medical history, surgical history, family history, medications,  and allergies  were all reviewed and updated as appropriate today. Patient Active Problem List   Diagnosis    Osteopenia    Atypical atrial flutter ([address]    S/P ablation operation for arrhythmia    Typical atrial flutter ([address]    Non-rheumatic mitral valve stenosis    Non-rheumatic mitral regurgitation    Mixed hyperlipidemia    Hypertension     Past Medical History:   Diagnosis Date    Atrial flutter ([address]     s/p ablation    Atrial tachycardia (HCC)     s/p ablation    CTS (carpal tunnel syndrome)     left    HLD (hyperlipidemia)     Hypertension     Mitral regurgitation 2016    moderate, also with aortic sclerosis per echo    Mixed hyperlipidemia 2018    Osteopenia 2013    Shingles 2012    Declines vaccine      Past Surgical History:   Procedure Laterality Date    ABLATION OF DYSRHYTHMIC FOCUS  16    L-sided atypical A flutter, L-sided AT    CATARACT REMOVAL WITH IMPLANT Left 2018    COLONOSCOPY      EYE SURGERY Right 2018    phaco with IOL    KNEE SURGERY Left     meniscus tear       Family History   Problem Relation Age of Onset    Other Mother         osteoporosis    Cancer Mother         ovarian, s/p oophorectomy/leukemia,  80    High Cholesterol Brother     Cancer Father         leukemia,  80    Stroke Brother       Allergies   Allergen Reactions    Lisinopril      Rash         Review of Systems   Constitutional: Negative for fatigue and fever. HENT: Negative. Eyes: Negative for photophobia and visual disturbance. Respiratory: Negative for cough and shortness of breath. Cardiovascular: Negative for chest pain and palpitations. Gastrointestinal: Negative for abdominal pain, constipation, diarrhea and nausea.    Genitourinary: Negative for difficulty urinating and frequency. Musculoskeletal: Negative. Skin: Negative. Neurological: Negative for dizziness, light-headedness and headaches. Psychiatric/Behavioral: Positive for confusion (the patient is having some memory issues). Vitals:    10/09/20 1009 10/09/20 1037   BP: (!) 186/94 (!) 160/74   Pulse: 62    Temp: 97.9 °F (36.6 °C)    TempSrc: Temporal    SpO2: 98%    Weight: 149 lb (67.6 kg)      Physical Exam  Constitutional:       General: She is not in acute distress. Appearance: Normal appearance. She is normal weight. HENT:      Head: Normocephalic and atraumatic. Neck:      Musculoskeletal: Normal range of motion. Cardiovascular:      Rate and Rhythm: Normal rate and regular rhythm. Pulses: Normal pulses. Heart sounds: Normal heart sounds. No murmur. No gallop. Pulmonary:      Effort: Pulmonary effort is normal.      Breath sounds: Normal breath sounds. No wheezing, rhonchi or rales. Musculoskeletal: Normal range of motion. Skin:     General: Skin is warm and dry. Neurological:      General: No focal deficit present. Mental Status: She is alert and oriented to person, place, and time. Psychiatric:         Mood and Affect: Mood normal.         Behavior: Behavior normal.         Thought Content: Thought content normal.         Judgment: Judgment normal.          Assessment/Plan:  1. Anxiety  Patient has a prescription for hydroxyzine. Patient states she has not been taking this. Reviewed the use of this medication for anxiety with patient and her daughter Puneet Welch. Patient is worried about taking additional medications. Discussed side effects of this medication including sleepiness. Prescription reordered. - hydrOXYzine (ATARAX) 25 MG tablet; 1/2 - 1 po tid prn anxiety  Dispense: 30 tablet; Refill: 1    2. Hypertension, unspecified type  Her daughter report elevated blood pressures at home.   In office her blood pressure is 186/94, a subsequent blood pressure was 160/74. The patient has been under increased stress over the past 2 weeks. Also unable to confirm that the patient has been taking her medication correctly. Her daughters are going to work with her on this. Patient was started on losartan previously for elevated blood pressures by cardiology, they stopped this due to well-managed blood pressures. Restart her on losartan 50 mg daily. And have her follow-up with PCP in 2 weeks for blood pressure recheck. Will have patient check blood pressure daily. - losartan (COZAAR) 50 MG tablet; Take 1 tablet by mouth daily  Dispense: 30 tablet; Refill: 1    3. Stress at home  Patient was referred to behavioral health consultants for help with the increased stress she is having at home currently. Patient and her daughter Edward Bray were introduced to [employer] her in office. 4. Memory loss    Patient says that she is aware that she is having some memory loss issues. Been pointed out to her by her daughters. She and her daughter plan to follow-up with Dr. Argelia Patiño in November. Return if symptoms worsen or fail to improve.

## 2020-10-13 NOTE — PROGRESS NOTES
sounds present  Extremities:  ·  No cyanosis or clubbing  ·  No lower extremity edema  ·  Skin: warm and dry  Neurological:  · Alert and oriented  · Moves all extremities well  · + forgetful at times    DATA:    ECG 10/14/2020:  SB HR 56    Echo 11/29/2016:  Normal left ventricle size with mild concentric left ventricular hypertrophy.   Normal systolic function with an estimated ejection fraction of 55%.   No regional wall motion abnormalities are seen.   Elevated left ventricular diastolic filling pressure ( E/e' 14.5 ).  The left atrium is severely dilated.   Moderate posterior mitral annular calcification is present.   Moderate mitral regurgitation.   Aortic valve appears sclerotic but opens adequately. Echo 1/27/2016:  -Normal left ventricle size, wall thickness and systolic function with an estimated ejection fraction of 55%.   -No regional wall motion abnormalities are seen.   -The left atrium is severly dilated.   -The right atrium is mildly dilated.   -Small ostium primum atrial septal defect with left-to-right shunt was visualized by color flow.   -Mitral annular calcification with restricted movement of the posterior leaflet.   -The maximum mitral valve pressure gradient is 12 mmHg and the mean pressure gradient is 3 mmHg.   -Mild mitral stenosis.   -At least moderate mitral regurgitation.   -Aortic valve appears sclerotic but opens adequately.   -Trace pulmonic and aortic valve regurgitation.   -Mild tricuspid regurgitation.   -Systolic pulmonary artery pressure (SPAP) is normal and estimated at 33mmHg (RA pressure 3 mmHg). AADM 1/8/2016:  Normal left ventricle size and wall thickness and left ventricular systolic   function with an estimated ejection fraction of 55%.   Moderate to severe mitral regurgitation is present.   The left atrium is dilated.   A bubble study was performed and showed no evidence of shunting.     CARDIOLOGY LABS:   CBC: No results for input(s): WBC, HGB, HCT, PLT in the last 72 hours. BMP: No results for input(s): NA, K, CO2, BUN, CREATININE, LABGLOM, GLUCOSE in the last 72 hours. PT/INR: No results for input(s): PROTIME, INR in the last 72 hours. APTT:No results for input(s): APTT in the last 72 hours. FASTING LIPID PANEL:  Lab Results   Component Value Date    HDL 44 11/11/2019    LDLCALC 118 11/11/2019    TRIG 156 11/11/2019     LIVER PROFILE:No results for input(s): AST, ALT, ALB in the last 72 hours. Assessment:   1. Atypical left sided atrial flutter and left sided atrial tachycardia: stable    -s/p ablation both 1/13/2016 with recurrence on EKG 1/22/2016    -anticoagulation has continued due to lack of symptoms with arrhythmia previously    -[id number] score 4 (age, gender, HTN)  2. Sinus bradycardia: asymptomatic  3. HTN: controlled   4. Mitral regurgitation: moderate on echo   5. Mitral stenosis: mild on echo 1/2016  6. HLD: on statin, PCP managing   7. Memory loss    Plan:   1. Continue Cardizem, metoprolol, losartan, and Eliquis   2. Check BMP now due to worsened peaked T-waves to rule out hyperkalemia with recent addition of losartan  3. Check CBC and TSH due to fatigue  4. PCP to manage HTN  5.  Follow up in 6 months with Dr. Val Ariza to reestablish care with MD Richmond Jimenez, [address]  [phone]

## 2020-10-14 ENCOUNTER — OFFICE VISIT (OUTPATIENT)
Dept: CARDIOLOGY CLINIC | Age: 80
End: 2020-10-14
Payer: MEDICARE

## 2020-10-14 VITALS
SYSTOLIC BLOOD PRESSURE: 138 MMHG | BODY MASS INDEX: 24.03 KG/M2 | DIASTOLIC BLOOD PRESSURE: 68 MMHG | WEIGHT: 149.5 LBS | HEIGHT: 66 IN | OXYGEN SATURATION: 98 % | HEART RATE: 55 BPM

## 2020-10-14 PROCEDURE — 93000 ELECTROCARDIOGRAM COMPLETE: CPT | Performed by: NURSE PRACTITIONER

## 2020-10-14 PROCEDURE — 99214 OFFICE O/P EST MOD 30 MIN: CPT | Performed by: NURSE PRACTITIONER

## 2020-10-14 NOTE — LETTER
[address]   Electrophysiology Note              Date:  October 14, 2020  Patient name: Malathi Garza  YOB: 1940    Primary Care physician: RACHEL MILLER MD    HISTORY OF PRESENT ILLNESS: The patient is an [de-identified] y.o.  female with a history of atrial flutter, AT, HTN, mitral regurgitation, and mild mitral stenosis. She was admitted in 1/2016 with atrial flutter with RVR but was asymptomatic with the arrhythmia. ADAM in 1/2016 showed moderate to severe MR. In 1/2016 she had RFCA of a left sided atypical atrial flutter and left sided AT, originating from right superior pulmonary vein with Dr. Milka Cruz. She returned in 1/2016 and her EKG showed atrial flutter. Echo in 1/2016 showed an EF of 55%. Repeat echo in 11/2016 showed an EF of 55% and moderate MR. She has been in sinus rhythm at subsequent visits. Today she is being seen for atrial flutter. EKG shows sinus natividad with a HR of 56. She has recent stress due to family moving in with her. Has some memory loss and some fatigue. Was recently started on losartan and reports BP is fluctuating at home. She denies chest pain, palpitations, shortness of breath, and dizziness. Past Medical History:   has a past medical history of Atrial flutter ([address] Atrial tachycardia ([address] CTS (carpal tunnel syndrome), HLD (hyperlipidemia), Hypertension, Mitral regurgitation, Mixed hyperlipidemia, Osteopenia, and Shingles. Past Surgical History:   has a past surgical history that includes knee surgery (Left, 2010); Colonoscopy; ablation of dysrhythmic focus (1/13/16); Cataract removal with implant (Left, 05/01/2018); and eye surgery (Right, 06/05/2018). Home Medications:    Prior to Admission medications    Medication Sig Start Date End Date Taking?  Authorizing Provider   hydrOXYzine (ATARAX) 25 MG tablet 1/2 - 1 po tid prn anxiety 10/9/20  Yes Elayne Cooley, APRN - CNP losartan (COZAAR) 50 MG tablet Take 1 tablet by mouth daily 10/9/20  Yes NICKY Bhatia - CNP   apixaban (ELIQUIS) 5 MG TABS tablet Take 1 tablet by mouth 2 times daily 10/8/20  Yes NICKY Nino - ALTAF   dilTIAZem GUILLAUME [facility]) 180 MG extended release capsule TAKE ONE CAPSULE BY MOUTH DAILY 4/20/20  Yes NICKY Nino - CNP   atorvastatin (LIPITOR) 10 MG tablet Take 1 tablet by mouth daily 4/17/20  Yes Judy Hallman MD   metoprolol tartrate (LOPRESSOR) 25 MG tablet TAKE ONE TABLET BY MOUTH TWICE A DAY 2/28/20  Yes Israel Stack MD   calcium-vitamin D (OSCAL-500) 500-200 MG-UNIT per tablet Take 1 tablet by mouth 2 times daily 1/14/16  Yes Corrinne Plumber, MD       Allergies:  Lisinopril    Social History:   reports that she has never smoked. She has never used smokeless tobacco. She reports that she does not drink alcohol or use drugs. Family History: family history includes Cancer in her father and mother; High Cholesterol in her brother; Other in her mother; Stroke in her brother. Review of Systems   All 14-point review of systems are completed and pertinent positives are mentioned in the history of present illness. Other systems are reviewed and are negative. PHYSICAL EXAM:    Vital signs:    /68   Pulse 55   Ht 5' 6\" (1.676 m)   Wt 149 lb 8 oz (67.8 kg)   SpO2 98%   BMI 24.13 kg/m²      Constitutional and general appearance: alert, cooperative, no distress and appears stated age  HEENT: PERRL, no cervical lymphadenopathy. No masses palpable. Normal oral mucosa  Respiratory:  · Normal excursion and expansion without use of accessory muscles  · Resp auscultation: Normal breath sounds without dullness or wheezing  Cardiovascular:  · The apical impulse is not displaced  · Gr 2/6 systolic murmur.  Regular S1 and S2.  · Jugular venous pulsation Normal  · The carotid upstroke is normal in amplitude and contour without delay or bruit · Peripheral pulses are symmetrical and full   Abdomen:  · No masses or tenderness  · Bowel sounds present  Extremities:  ·  No cyanosis or clubbing  ·  No lower extremity edema  ·  Skin: warm and dry  Neurological:  · Alert and oriented  · Moves all extremities well  · + forgetful at times    DATA:    ECG 10/14/2020:  SB HR 56    Echo 11/29/2016:  Normal left ventricle size with mild concentric left ventricular hypertrophy.   Normal systolic function with an estimated ejection fraction of 55%.   No regional wall motion abnormalities are seen.   Elevated left ventricular diastolic filling pressure ( E/e' 14.5 ).  The left atrium is severely dilated.   Moderate posterior mitral annular calcification is present.   Moderate mitral regurgitation.   Aortic valve appears sclerotic but opens adequately. Echo 1/27/2016:  -Normal left ventricle size, wall thickness and systolic function with an estimated ejection fraction of 55%.   -No regional wall motion abnormalities are seen.   -The left atrium is severly dilated.   -The right atrium is mildly dilated.   -Small ostium primum atrial septal defect with left-to-right shunt was visualized by color flow.   -Mitral annular calcification with restricted movement of the posterior leaflet.   -The maximum mitral valve pressure gradient is 12 mmHg and the mean pressure gradient is 3 mmHg.   -Mild mitral stenosis.   -At least moderate mitral regurgitation.   -Aortic valve appears sclerotic but opens adequately.   -Trace pulmonic and aortic valve regurgitation.   -Mild tricuspid regurgitation.   -Systolic pulmonary artery pressure (SPAP) is normal and estimated at 33mmHg (RA pressure 3 mmHg). ADAM 1/8/2016:  Normal left ventricle size and wall thickness and left ventricular systolic   function with an estimated ejection fraction of 55%.   Moderate to severe mitral regurgitation is present.   The left atrium is dilated.  A bubble study was performed and showed no evidence of shunting. CARDIOLOGY LABS:   CBC: No results for input(s): WBC, HGB, HCT, PLT in the last 72 hours. BMP: No results for input(s): NA, K, CO2, BUN, CREATININE, LABGLOM, GLUCOSE in the last 72 hours. PT/INR: No results for input(s): PROTIME, INR in the last 72 hours. APTT:No results for input(s): APTT in the last 72 hours. FASTING LIPID PANEL:  Lab Results   Component Value Date    HDL 44 11/11/2019    LDLCALC 118 11/11/2019    TRIG 156 11/11/2019     LIVER PROFILE:No results for input(s): AST, ALT, ALB in the last 72 hours. Assessment:   1. Atypical left sided atrial flutter and left sided atrial tachycardia: stable    -s/p ablation both 1/13/2016 with recurrence on EKG 1/22/2016    -anticoagulation has continued due to lack of symptoms with arrhythmia previously    -[id number] score 4 (age, gender, HTN)  2. Sinus bradycardia: asymptomatic  3. HTN: controlled   4. Mitral regurgitation: moderate on echo   5. Mitral stenosis: mild on echo 1/2016  6. HLD: on statin, PCP managing   7. Memory loss    Plan:   1. Continue Cardizem, metoprolol, losartan, and Eliquis   2. Check BMP now due to worsened peaked T-waves to rule out hyperkalemia with recent addition of losartan  3. Check CBC and TSH due to fatigue  4. PCP to manage HTN  5.  Follow up in 6 months with Dr. Nikki Burkett to reestablish care with MD Charles Rodriguez, [address]  [phone]

## 2020-10-15 ENCOUNTER — HOSPITAL ENCOUNTER (OUTPATIENT)
Age: 80
Discharge: HOME | End: 2020-10-15
Payer: MEDICARE

## 2020-10-15 LAB
ANION GAP SERPL CALCULATED.3IONS-SCNC: 10 MMOL/L (ref 3–16)
BASOPHILS ABSOLUTE: 0.1 K/UL (ref 0–0.2)
BASOPHILS RELATIVE PERCENT: 0.8 %
BUN BLDV-MCNC: 20 MG/DL (ref 7–20)
CALCIUM SERPL-MCNC: 9.6 MG/DL (ref 8.3–10.6)
CHLORIDE BLD-SCNC: 103 MMOL/L (ref 99–110)
CO2: 29 MMOL/L (ref 21–32)
CREAT SERPL-MCNC: 0.9 MG/DL (ref 0.6–1.2)
EOSINOPHILS ABSOLUTE: 0.1 K/UL (ref 0–0.6)
EOSINOPHILS RELATIVE PERCENT: 2.3 %
GFR AFRICAN AMERICAN: >60
GFR NON-AFRICAN AMERICAN: >60
GLUCOSE BLD-MCNC: 87 MG/DL (ref 70–99)
HCT VFR BLD CALC: 40.9 % (ref 36–48)
HEMOGLOBIN: 13.5 G/DL (ref 12–16)
LYMPHOCYTES ABSOLUTE: 1.2 K/UL (ref 1–5.1)
LYMPHOCYTES RELATIVE PERCENT: 20.4 %
MCH RBC QN AUTO: 30.8 PG (ref 26–34)
MCHC RBC AUTO-ENTMCNC: 33 G/DL (ref 31–36)
MCV RBC AUTO: 93.1 FL (ref 80–100)
MONOCYTES ABSOLUTE: 0.5 K/UL (ref 0–1.3)
MONOCYTES RELATIVE PERCENT: 9 %
NEUTROPHILS ABSOLUTE: 4.1 K/UL (ref 1.7–7.7)
NEUTROPHILS RELATIVE PERCENT: 67.5 %
PDW BLD-RTO: 13 % (ref 12.4–15.4)
PLATELET # BLD: 294 K/UL (ref 135–450)
PMV BLD AUTO: 9 FL (ref 5–10.5)
POTASSIUM SERPL-SCNC: 4.7 MMOL/L (ref 3.5–5.1)
RBC # BLD: 4.39 M/UL (ref 4–5.2)
SODIUM BLD-SCNC: 142 MMOL/L (ref 136–145)
TSH REFLEX FT4: 0.35 UIU/ML (ref 0.27–4.2)
WBC # BLD: 6 K/UL (ref 4–11)

## 2020-10-15 PROCEDURE — 85025 COMPLETE CBC W/AUTO DIFF WBC: CPT

## 2020-10-15 PROCEDURE — 80048 BASIC METABOLIC PNL TOTAL CA: CPT

## 2020-10-15 PROCEDURE — 36415 COLL VENOUS BLD VENIPUNCTURE: CPT

## 2020-10-15 PROCEDURE — 84443 ASSAY THYROID STIM HORMONE: CPT

## 2020-10-27 ENCOUNTER — TELEPHONE (OUTPATIENT)
Dept: CARDIOLOGY CLINIC | Age: 80
End: 2020-10-27

## 2020-11-04 NOTE — TELEPHONE ENCOUNTER
Called patient and left message on VM letting her know that labs are back and Britt did review them. Asked patient to call back to get results.  Per NP BB all of patients lab results are normal. (See under Labs tab, Patient Results Comments in patient chart to see NP BB notes)

## 2020-11-10 NOTE — PROGRESS NOTES
Patient: Sal Montalvo is a [de-identified] y. o.female who presents today with the following Chief Complaint(s):  Chief Complaint   Patient presents with    Medicare AWV    Hypertension    Hyperlipidemia    Memory Loss         HPI: Pt with hx of a flutter, AT s/p ablation 2016, htn, hld, MR, mitral stenosis had nl cbc, tsh, bmp 3 wk ago per cards when pt reporte fatigue and memory loss. Drives, does not get lost, reads map. Dtr notes mom has more difficulty using phone, verena when stressed. Recently turned over bill paying to dtr. Had fear of forgetting to pay bill. Dtr has taken over cooking. Pt conts ot cook at times w/o problem. Pt considered selling home b/c she was aware she needed help. Dtr states she often repeats questions. No FHx of dementia. Pt texted dtr about need to apologize, felt something happened bu didn't. Dtr called to voice concern of memory loss that impairs pt's ADL. Denies dep, anx.   Mild cog impairment? MCI  Rarely uses hydroxyzine x mo's    MMSE and imaging for SDH, NPH, mass, cerebrovasc dz  B12, TSH  ? Depression, sleep disturbance, ? etoh-no  antichol or antihistamines? [address] [facility] ?     Dtr Moses Reilly and her family     Dtr checks bp qd, 130 low or 160-80/70s     Current Outpatient Medications   Medication Sig Dispense Refill    hydrOXYzine (ATARAX) 25 MG tablet 1/2 - 1 po tid prn anxiety 30 tablet 1    losartan (COZAAR) 50 MG tablet Take 1 tablet by mouth daily 30 tablet 1    apixaban (ELIQUIS) 5 MG TABS tablet Take 1 tablet by mouth 2 times daily 60 tablet 2    dilTIAZem (TIAZAC) 180 MG extended release capsule TAKE ONE CAPSULE BY MOUTH DAILY 90 capsule 2    atorvastatin (LIPITOR) 10 MG tablet Take 1 tablet by mouth daily 90 tablet 1    metoprolol tartrate (LOPRESSOR) 25 MG tablet TAKE ONE TABLET BY MOUTH TWICE A  tablet 2    calcium-vitamin D (OSCAL-500) 500-200 MG-UNIT per tablet Take 1 tablet by mouth 2 times daily 60 tablet 1     No current facility-administered medications for this visit. Patient's past medical history,surgical history, family history, medications,  and allergies  were all reviewed and updated as appropriate today. Review of Systems      Physical Exam      BP (!) 162/78   Pulse 91   Temp 97.7 °F (36.5 °C)   Ht 5' 3\" (1.6 m)   Wt 152 lb 6.4 oz (69.1 kg)   SpO2 97%   BMI 27.00 kg/m²     Assessment/Plan:    Ladarius Carr was seen today for medicare awv, hypertension, hyperlipidemia and memory loss.     Diagnoses and all orders for this visit:    Routine general medical examination at a [facility] facility    MMSE   chiro x 3 wk

## 2020-11-11 ENCOUNTER — OFFICE VISIT (OUTPATIENT)
Dept: FAMILY MEDICINE CLINIC | Age: 80
End: 2020-11-11
Payer: MEDICARE

## 2020-11-11 VITALS
OXYGEN SATURATION: 97 % | WEIGHT: 152.4 LBS | TEMPERATURE: 97.7 F | HEART RATE: 91 BPM | DIASTOLIC BLOOD PRESSURE: 70 MMHG | HEIGHT: 63 IN | BODY MASS INDEX: 27 KG/M2 | SYSTOLIC BLOOD PRESSURE: 144 MMHG

## 2020-11-11 LAB
ALBUMIN SERPL-MCNC: 3.9 G/DL (ref 3.4–5)
ALP BLD-CCNC: 67 U/L (ref 40–129)
ALT SERPL-CCNC: 18 U/L (ref 10–40)
AST SERPL-CCNC: 17 U/L (ref 15–37)
BILIRUB SERPL-MCNC: 1.3 MG/DL (ref 0–1)
BILIRUBIN DIRECT: <0.2 MG/DL (ref 0–0.3)
BILIRUBIN, INDIRECT: ABNORMAL MG/DL (ref 0–1)
CHOLESTEROL, TOTAL: 164 MG/DL (ref 0–199)
HDLC SERPL-MCNC: 40 MG/DL (ref 40–60)
LDL CHOLESTEROL CALCULATED: 101 MG/DL
TOTAL PROTEIN: 6.5 G/DL (ref 6.4–8.2)
TRIGL SERPL-MCNC: 117 MG/DL (ref 0–150)
VITAMIN B-12: 327 PG/ML (ref 211–911)
VLDLC SERPL CALC-MCNC: 23 MG/DL

## 2020-11-11 PROCEDURE — G0439 PPPS, SUBSEQ VISIT: HCPCS | Performed by: FAMILY MEDICINE

## 2020-11-11 PROCEDURE — 36415 COLL VENOUS BLD VENIPUNCTURE: CPT | Performed by: FAMILY MEDICINE

## 2020-11-11 PROCEDURE — 99214 OFFICE O/P EST MOD 30 MIN: CPT | Performed by: FAMILY MEDICINE

## 2020-11-11 RX ORDER — ATORVASTATIN CALCIUM 10 MG/1
10 TABLET, FILM COATED ORAL DAILY
Qty: 90 TABLET | Refills: 3 | Status: SHIPPED | OUTPATIENT
Start: 2020-11-11 | End: 2022-01-03 | Stop reason: SDUPTHER

## 2020-11-11 RX ORDER — DILTIAZEM HYDROCHLORIDE 180 MG/1
CAPSULE, EXTENDED RELEASE ORAL
Qty: 90 CAPSULE | Refills: 3 | Status: SHIPPED | OUTPATIENT
Start: 2020-11-11 | End: 2022-01-03 | Stop reason: SDUPTHER

## 2020-11-11 RX ORDER — LOSARTAN POTASSIUM 100 MG/1
100 TABLET ORAL DAILY
Qty: 90 TABLET | Refills: 3 | Status: SHIPPED | OUTPATIENT
Start: 2020-11-11 | End: 2021-11-12 | Stop reason: SDUPTHER

## 2020-11-11 ASSESSMENT — PATIENT HEALTH QUESTIONNAIRE - PHQ9
SUM OF ALL RESPONSES TO PHQ9 QUESTIONS 1 & 2: 1
SUM OF ALL RESPONSES TO PHQ QUESTIONS 1-9: 1
SUM OF ALL RESPONSES TO PHQ QUESTIONS 1-9: 1
2. FEELING DOWN, DEPRESSED OR HOPELESS: 1
1. LITTLE INTEREST OR PLEASURE IN DOING THINGS: 0
SUM OF ALL RESPONSES TO PHQ QUESTIONS 1-9: 1

## 2020-11-11 ASSESSMENT — LIFESTYLE VARIABLES: HOW OFTEN DO YOU HAVE A DRINK CONTAINING ALCOHOL: 0

## 2020-11-11 ASSESSMENT — ENCOUNTER SYMPTOMS
BACK PAIN: 1
SHORTNESS OF BREATH: 0

## 2020-11-11 NOTE — PATIENT INSTRUCTIONS
Personalized Preventive Plan for [place] - 11/11/2020  Medicare offers a range of preventive health benefits. Some of the tests and screenings are paid in full while other may be subject to a deductible, co-insurance, and/or copay. Some of these benefits include a comprehensive review of your medical history including lifestyle, illnesses that may run in your family, and various assessments and screenings as appropriate. After reviewing your medical record and screening and assessments performed today your provider may have ordered immunizations, labs, imaging, and/or referrals for you. A list of these orders (if applicable) as well as your Preventive Care list are included within your After Visit Summary for your review. Other Preventive Recommendations:    · A preventive eye exam performed by an eye specialist is recommended every 1-2 years to screen for glaucoma; cataracts, macular degeneration, and other eye disorders. · A preventive dental visit is recommended every 6 months. · Try to get at least 150 minutes of exercise per week or 10,000 steps per day on a pedometer . · Order or download the FREE \"Exercise & Physical Activity: Your Everyday Guide\" from [employer]. Call 9-129.390.7395 or search The [employer] online. · You need 8202-5309 mg of calcium and 2205-6265 IU of vitamin D per day. It is possible to meet your calcium requirement with diet alone, but a vitamin D supplement is usually necessary to meet this goal.  · When exposed to the sun, use a sunscreen that protects against both UVA and UVB radiation with an SPF of 30 or greater. Reapply every 2 to 3 hours or after sweating, drying off with a towel, or swimming. · Always wear a seat belt when traveling in a car. Always wear a helmet when riding a bicycle or motorcycle.     Keep Your Memory Charoletchris Cramer       Many factors can affect your ability to remembera hectic lifestyle, aging, stress, nutritional needs are being met? Can herbs and supplements still offer a benefit? Researchers have investigated a range of natural remedies, such as ginkgo , ginseng , and the supplement phosphatidylserine (PS). So far, though, the evidence is inconsistent as to whether these products can improve memory or thinking. If you are interested in taking herbs and supplements, talk to your doctor first because they may interact with other medicines that you are taking. Exercise Regularly    Among the many benefits of regular exercise are increased blood flow to the brain and decreased risk of certain diseases that can interfere with memory function. One study found that even moderate exercise has a beneficial effect. Examples of \"moderate\" exercise include:   Playing 18 holes of golf once a week, without a cart   Playing tennis twice a week   Walking one mile per day   Manage Stress    It can be tough to remember what is important when your mind is cluttered. Make time for relaxation. Choose activities that calm you down, and make it routine. Manage Chronic Conditions    Side effects of high blood pressure , diabetes, and heart disease can interfere with mental function. Many of the lifestyle steps discussed here can help manage these conditions. Strive to eat a healthy diet, exercise regularly, get stress under control, and follow your doctor's advice for your condition. Minimize Medications    Talk to your doctor about the medicines that you take. Some may be unnecessary. Also, healthy lifestyle habits may lower the need for certain drugs. Last Reviewed: April 2010 Charlee Walton MD   Updated: 4/13/2010   ·     [address]       As we get older, changes in balance, gait, strength, vision, hearing, and cognition make even the most youthful senior more prone to accidents. Falls are one of the leading health risks for older people.  This increased risk of falling is related to:   Aging process (eg, cannot see clearly, you are more likely to fall. Important questions to ask yourself include:   Are lamp, electric, extension, and telephone cords placed out of the flow of traffic and maintained in good condition? Have frayed cords been replaced? Are all small rugs and runners slip resistant? If not, you can secure them to the floor with a special double-sided carpet tape. Are smoke detectors properly locatedone on every floor of your home and one outside of every sleeping area? Are they in good working order? Are batteries replaced at least once a year? Do you have a well-maintained carbon monoxide detector outside every sleeping are in your home? Does your furniture layout leave plenty of space to maneuver between and around chairs, tables, beds, and sofas? Are hallways, stairs and passages between rooms well lit? Can you reach a lamp without getting out of bed? Are floor surfaces well maintained? Shag rugs, high-pile carpeting, tile floors, and polished wood floors can be particularly slippery. Stairs should always have handrails and be carpeted or fitted with a non-skid tread. Is your telephone easily reachable. Is the cord safely tucked away? Room by Room   According to the Association of Aging, bathrooms and jaye are the two most potentially hazardous rooms in your home. In the Kitchen    Be sure your stove is in proper working order and always make sure burners and the oven are off before you go out or go to sleep. Keep pots on the back burners, turn handles away from the front of the stove, and keep stove clean and free of grease build-up. Kitchen ventilation systems and range exhausts should be working properly. Keep flammable objects such as towels and pot holders away from the cooking area except when in use. Make sure kitchen curtains are tied back. Move cords and appliances away from the sink and hot surfaces.  If extension cords are needed, install wiring guides so they do not hang over the sink, range, or working areas. Look for coffee pots, kettles and toaster ovens with automatic shut-offs. Keep a mop handy in the kitchen so you can wipe up spills instantly. You should also have a small fire extinguisher. Arrange your kitchen with frequently used items on lower shelves to avoid the need to stand on a stepstool to reach them. Make sure countertops are well-lit to avoid injuries while cutting and preparing food. In the Bathroom    Use a non-slip mat or decals in the tub and shower, since wet, soapy tile or porcelain surfaces are extremely slippery. Make sure bathroom rugs are non-skid or tape them firmly to the floor. Bathtubs should have at least one, preferably two, grab bars, firmly attached to structural supports in the wall. (Do not use built-in soap holders or glass shower doors as grab bars.)    Tub seats fitted with non-slip material on the legs allow you to wash sitting down. For people with limited mobility, bathtub transfer benches allow you to slide safely into the tub. Raised toilet seats and toilet safety rails are helpful for those with knee or hip problems. In the [facility]    Make sure you use a nightlight and that the area around your bed is clear of potential obstacles. Be careful with electric blankets and never go to sleep with a heating pad, which can cause serious burns even if on a low setting. Use fire-resistant mattress covers and pillows, and NEVER smoke in bed. Keep a phone next to the bed that is programmed to dial 911 at the push of a button. If you have a chronic condition, you may want to sign on with an automatic call-in service. Typically the system includes a small pendant that connects directly to an emergency medical voice-response system. You should also make arrangements to stay in contact with someonefriend, neighbor, family memberon a regular schedule.    Fire Prevention   According to the [employer] (Smoke Alarms for Every) [address] [facility], senior citizens are one of the two highest risk groups for death and serious injuries due to residential fires. When cooking, wear short-sleeved items, never a bulky long-sleeved robe. The [facility]'s Safety Checklist for Older Consumers emphasizes the importance of checking basements, garages, workshops and storage areas for fire hazards, such as volatile liquids, piles of old rags or clothing and overloaded circuits. Never smoke in bed or when lying down on a couch or recliner chair. Small portable electric or kerosene heaters are responsible for many home fires and should be used cautiously if at all. If you do use one, be sure to keep them away from flammable materials. In case of fire, make sure you have a pre-established emergency exit plan. Have a professional check your fireplace and other fuel-burning appliances yearly. [employer]   Baby boomers entering the mccray years will continue to see the development of new products to help older adults live safely and independently in spite of age-related changes. Making Life More Livable  , by [employer], lists over 1,000 products for \"living well in the mature years,\" such as bathing and mobility aids, household security devices, ergonomically designed knives and peelers, and faucet valves and knobs for temperature control. Medical supply stores and organizations are good sources of information about products that improve your quality of life and insure your safety. Last Reviewed: November 2009 Kathy Samuel MD   Updated: 3/7/2011               Please increase losartan 50 mg to 100 mg daily. Personalized Preventive Plan for Jana Pert - 11/11/2020  Medicare offers a range of preventive health benefits. Some of the tests and screenings are paid in full while other may be subject to a deductible, co-insurance, and/or copay.     Some of these benefits include a comprehensive review of your medical history including lifestyle, illnesses that may run in your family, and various assessments and screenings as appropriate. After reviewing your medical record and screening and assessments performed today your provider may have ordered immunizations, labs, imaging, and/or referrals for you. A list of these orders (if applicable) as well as your Preventive Care list are included within your After Visit Summary for your review. Other Preventive Recommendations:    · A preventive eye exam performed by an eye specialist is recommended every 1-2 years to screen for glaucoma; cataracts, macular degeneration, and other eye disorders. · A preventive dental visit is recommended every 6 months. · Try to get at least 150 minutes of exercise per week or 10,000 steps per day on a pedometer . · Order or download the FREE \"Exercise & Physical Activity: Your Everyday Guide\" from The [employer]. Call 5-462.181.5971 or search The [employer] online. · You need 0925-1910 mg of calcium and 6568-0540 IU of vitamin D per day. It is possible to meet your calcium requirement with diet alone, but a vitamin D supplement is usually necessary to meet this goal.  · When exposed to the sun, use a sunscreen that protects against both UVA and UVB radiation with an SPF of 30 or greater. Reapply every 2 to 3 hours or after sweating, drying off with a towel, or swimming. · Always wear a seat belt when traveling in a car. Always wear a helmet when riding a bicycle or motorcycle.

## 2020-11-11 NOTE — PROGRESS NOTES
Medicare Annual Wellness Visit  Name: Jeremy Larson Date: 2020   MRN: [id number] Sex: Female   Age: [de-identified] y.o. Ethnicity: Non-/Non    : 1940 Race: Carlos Enrique Becerra is here for Medicare AWV; Hypertension; Hyperlipidemia; and Memory Loss    Screenings for behavioral, psychosocial and functional/safety risks, and cognitive dysfunction are all negative except as indicated below. These results, as well as other patient data from the [address] form, are documented in Flowsheets linked to this Encounter. Allergies   Allergen Reactions    Lisinopril      Rash       Prior to Visit Medications    Medication Sig Taking?  Authorizing Provider   hydrOXYzine (ATARAX) 25 MG tablet 1/2 - 1 po tid prn anxiety  NICKY Morgan CNP   losartan (COZAAR) 50 MG tablet Take 1 tablet by mouth daily  NICKY Morgan CNP   apixaban (ELIQUIS) 5 MG TABS tablet Take 1 tablet by mouth 2 times daily  NICKY Block CNP   dilTIAZem (TIAZAC) 180 MG extended release capsule TAKE ONE CAPSULE BY MOUTH DAILY  NICKY Block CNP   atorvastatin (LIPITOR) 10 MG tablet Take 1 tablet by mouth daily  Kavya Do MD   metoprolol tartrate (LOPRESSOR) 25 MG tablet TAKE ONE TABLET BY MOUTH TWICE A DAY  PALMA Celis MD   calcium-vitamin D (Liliam Simon) 500-200 MG-UNIT per tablet Take 1 tablet by mouth 2 times daily  Ruperto Stephens MD       Past Medical History:   Diagnosis Date    Atrial flutter [facility])     s/p ablation    Atrial tachycardia [facility])     s/p ablation    CTS (carpal tunnel syndrome)     left    HLD (hyperlipidemia)     Hypertension     Mitral regurgitation 2016    moderate, also with aortic sclerosis per echo    Mixed hyperlipidemia 2018    Osteopenia 2013    Shingles 2012    Declines vaccine       Past Surgical History:   Procedure Laterality Date    ABLATION OF DYSRHYTHMIC FOCUS  16    L-sided atypical A flutter, L-sided AT    CATARACT REMOVAL WITH IMPLANT Left 2018    COLONOSCOPY      EYE SURGERY Right 2018    phaco with IOL    KNEE SURGERY Left     meniscus tear       Family History   Problem Relation Age of Onset    Other Mother         osteoporosis    Cancer Mother         ovarian, s/p oophorectomy/leukemia,  80    High Cholesterol Brother     Cancer Father         leukemia,  80    Stroke Brother        CareTeam (Including outside providers/suppliers regularly involved in providing care):   Patient Care Team:  Jennifer Aviles MD as PCP - Seth Robles MD as PCP - [facility]  Orlando Campos MD as Consulting Physician (Cardiology)    Wt Readings from Last 3 Encounters:   20 152 lb 6.4 oz (69.1 kg)   10/14/20 149 lb 8 oz (67.8 kg)   10/09/20 149 lb (67.6 kg)     Vitals:    20 1002   BP: (!) 162/78   Pulse: 91   Temp: 97.7 °F (36.5 °C)   SpO2: 97%   Weight: 152 lb 6.4 oz (69.1 kg)   Height: 5' 3\" (1.6 m)     Body mass index is 27 kg/m². Based upon direct observation of the patient, evaluation of cognition reveals recent and remote memory intact. Patient's complete Health Risk Assessment and screening values have been reviewed and are found in Flowsheets. The following problems were reviewed today and where indicated follow up appointments were made and/or referrals ordered. Positive Risk Factor Screenings with Interventions:     General Health and ACP:  General  In general, how would you say your health is?: Good  In the past 7 days, have you experienced any of the following?  New or Increased Pain, New or Increased Fatigue, Loneliness, Social Isolation, Stress or Anger?: (!) Stress  Do you get the social and emotional support that you need?: Yes  Do you have a Living Will?: Yes  Advance Directives     Power of  Living Will ACP-Advance Directive ACP-Power of     Not on File [place] on [address] Interventions:  · No Living Will: Advance Care Planning addressed with patient today    Health Habits/Nutrition:  Health Habits/Nutrition  Do you exercise for at least 20 minutes 2-3 times per week?: (!) No  Have you lost any weight without trying in the past 3 months?: No  Do you eat fewer than 2 meals per day?: No  Have you seen a dentist within the past year?: Yes  Body mass index: (!) 26.99  Health Habits/Nutrition Interventions:  · Inadequate physical activity:  educational materials provided to promote increased physical activity  · Nutritional issues:  educational materials for healthy, well-balanced diet provided    ADL:  ADLs  In the past 7 days, did you need help from others to perform any of the following everyday activities? Eating, dressing, grooming, bathing, toileting, or walking/balance?: None  In the past 7 days, did you need help from others to take care of any of the following?  Laundry, housekeeping, banking/finances, shopping, telephone use, food preparation, transportation, or taking medications?: (!) Banking/Finances, Housekeeping, Laundry  ADL Interventions:  · Patient declines any further evaluation/treatment for this issue daughter has moved in with her due to memory issues she has noticed     Personalized Preventive Plan   Current Health Maintenance Status  Immunization History   Administered Date(s) Administered    Influenza Vaccine, unspecified formulation 01/09/2017    Influenza, High Dose (Fluzone 65 yrs and older) 01/09/2017, 01/24/2018, 01/23/2019    Influenza, Triv, inactivated, subunit, adjuvanted, IM (Fluad 65 yrs and older) 09/19/2019    Pneumococcal Conjugate 13-valent ([id number]) 02/08/2016    Pneumococcal Polysaccharide ([id number]) 03/25/2011, 07/24/2017    Tdap (Boostrix, Adacel) 11/06/2014    Zoster Recombinant (Shingrix) 12/16/2009, 09/19/2019, 12/16/2019        Health Maintenance   Topic Date Due    Annual Wellness Visit (AWV)  05/29/2019    Lipid screen 11/11/2020    Potassium monitoring  10/15/2021    Creatinine monitoring  10/15/2021    DTaP/Tdap/Td vaccine (2 - Td) 11/06/2024    Colon cancer screen colonoscopy  01/18/2028    DEXA (modify frequency per FRAX score)  Completed    Flu vaccine  Completed    Shingles Vaccine  Completed    Pneumococcal 65+ years Vaccine  Completed    Hepatitis A vaccine  Aged Out    Hepatitis B vaccine  Aged Out    Hib vaccine  Aged Out    Meningococcal (ACWY) vaccine  Aged Out     Recommendations for [employer] Due: see orders and patient instructions/AVS.  . Recommended screening schedule for the next 5-10 years is provided to the patient in written form: see Patient Instructions/AVS.    Herminio SHEFFIELD LPN, 80/19/8805, performed the documented evaluation under the direct supervision of the attending physician. This encounter was performed under RACHEL reis MDs, direct supervision, 11/11/2020.

## 2020-11-11 NOTE — PROGRESS NOTES
Patient: Ravi Snyder is a [de-identified] y. o.female who presents today with the following Chief Complaint(s):  Chief Complaint   Patient presents with    Medicare AWV    Hypertension    Hyperlipidemia    Memory Loss         HPI: Pt with hx of a flutter, AT s/p ablation 2016, htn, hld, MR, mitral stenosis had nl cbc, tsh, bmp 3 wk ago per cards when pt reporte fatigue and memory loss. Dtr Sandrodylan Francisco comes with pt to discuss memory concerns. Pino Francisco and family lived in [place] x 8 yr doing [employer] [employer] work. She and family completed their assignment and have moved in with pt. Pt considered selling home b/c she was aware she needed help. Pt drives, does not get lost, reads map on phone. Dtr Pino Pereiraton, however, notes mom has more difficulty using phone than in past, verena when stressed. Pt recently turned over bill paying to dtr. Had fear of forgetting to pay bill, though did not. Dtr has taken over cooking though pt conts ot cook simple foods at times w/o problem. Dtr states pt often repeats questions. No FHx of dementia. Rarely uses hydroxyzine, has not used in recent mo's. Denies depression and etoh use. Dtr checks bp qd, 130 min to 160-80/70 max. No cp, sob, dizziness.                    Current Outpatient Medications   Medication Sig Dispense Refill    apixaban (ELIQUIS) 5 MG TABS tablet Take 1 tablet by mouth 2 times daily 180 tablet 3    metoprolol tartrate (LOPRESSOR) 25 MG tablet Take 1 tablet by mouth 2 times daily 180 tablet 3    atorvastatin (LIPITOR) 10 MG tablet Take 1 tablet by mouth daily 90 tablet 3    dilTIAZem (TIAZAC) 180 MG extended release capsule TAKE ONE CAPSULE BY MOUTH DAILY 90 capsule 3    losartan (COZAAR) 100 MG tablet Take 1 tablet by mouth daily 90 tablet 3    hydrOXYzine (ATARAX) 25 MG tablet 1/2 - 1 po tid prn anxiety 30 tablet 1    calcium-vitamin D (OSCAL-500) 500-200 MG-UNIT per tablet Take 1 tablet by mouth 2 times daily 60 tablet 1     No current facility-administered medications for this visit. Patient's past medical history,surgical history, family history, medications,  and allergies  were all reviewed and updated as appropriate today. Review of Systems   Constitutional: Negative for activity change, appetite change, fever and unexpected weight change. Eyes: Negative for visual disturbance. Respiratory: Negative for shortness of breath. Cardiovascular: Negative for chest pain. Musculoskeletal: Positive for back pain (recently seeing chiro). Neurological: Negative for dizziness. Psychiatric/Behavioral: Negative for dysphoric mood and sleep disturbance. The patient is nervous/anxious. Physical Exam  Constitutional:       Appearance: Normal appearance. She is well-developed. HENT:      Head: Normocephalic and atraumatic. Right Ear: External ear normal.      Left Ear: External ear normal.   Eyes:      General: No scleral icterus. Right eye: No discharge. Left eye: No discharge. Extraocular Movements: Extraocular movements intact. Conjunctiva/sclera: Conjunctivae normal.   Neck:      Comments: No visualized masses  FROM  Pulmonary:      Effort: Pulmonary effort is normal.   Musculoskeletal: Normal range of motion. Skin:     General: Skin is warm and dry. Neurological:      General: No focal deficit present. Mental Status: She is alert and oriented to person, place, and time. Psychiatric:         Mood and Affect: Mood normal.         Behavior: Behavior normal.         Thought Content: Thought content normal.         Judgment: Judgment normal.      Comments: Pt has good insight, is well aware of memory loss concerns           BP (!) 144/70   Pulse 91   Temp 97.7 °F (36.5 °C)   Ht 5' 3\" (1.6 m)   Wt 152 lb 6.4 oz (69.1 kg)   SpO2 97%   BMI 27.00 kg/m²     Assessment/Plan:    Shreya Eng was seen today for medicare awv, hypertension, hyperlipidemia and memory loss.     Diagnoses and all orders for this visit:    Routine general medical examination at a health care facility    Memory loss  -     VITAMIN B12, Recent TSH nl per cards  -     CT HEAD W CONTRAST; Future to r/o SDH, NPH, mass, cerebrovascular dz   Plan MMSE next visit and will consider aricept, possible [address] [facility] referral. Nl clock draw is noted. Pt and dtr Deni Wilcox understand and agree with need to look for memory loss etiology that can be reversed. Hypertension, unspecified type  -     metoprolol tartrate (LOPRESSOR) 25 MG tablet; Take 1 tablet by mouth 2 times daily, rf  -     losartan (COZAAR) 100 MG tablet; Take 1 tablet by mouth daily, increase from 50 mg    Atypical atrial flutter ([address] rf:  -     apixaban (ELIQUIS) 5 MG TABS tablet; Take 1 tablet by mouth 2 times daily  -     metoprolol tartrate (LOPRESSOR) 25 MG tablet; Take 1 tablet by mouth 2 times daily  -     dilTIAZem (TIAZAC) 180 MG extended release capsule; TAKE ONE CAPSULE BY MOUTH DAILY    Mixed hyperlipidemia  -     Hepatic Function Panel  -     atorvastatin (LIPITOR) 10 MG tablet; Take 1 tablet by mouth daily, rf  -     Lipid Panel    To further discuss back pain at f/u appt.

## 2020-11-16 ENCOUNTER — TELEPHONE (OUTPATIENT)
Dept: FAMILY MEDICINE CLINIC | Age: 80
End: 2020-11-16

## 2020-11-16 NOTE — TELEPHONE ENCOUNTER
Received message from central scheduling to change CT order to CT W and WO Contrast due to diagnoses? Please advise?

## 2020-11-17 ENCOUNTER — HOSPITAL ENCOUNTER (OUTPATIENT)
Dept: CT IMAGING | Age: 80
Discharge: HOME | End: 2020-11-17
Payer: MEDICARE

## 2020-11-17 PROCEDURE — 70470 CT HEAD/BRAIN W/O & W/DYE: CPT

## 2020-11-17 PROCEDURE — 6360000004 HC RX CONTRAST MEDICATION: Performed by: FAMILY MEDICINE

## 2020-11-17 RX ADMIN — IOPAMIDOL 75 ML: 755 INJECTION, SOLUTION INTRAVENOUS at 15:21

## 2020-11-23 ENCOUNTER — OFFICE VISIT (OUTPATIENT)
Dept: FAMILY MEDICINE CLINIC | Age: 80
End: 2020-11-23
Payer: MEDICARE

## 2020-11-23 VITALS
BODY MASS INDEX: 26.57 KG/M2 | OXYGEN SATURATION: 97 % | WEIGHT: 150 LBS | SYSTOLIC BLOOD PRESSURE: 120 MMHG | TEMPERATURE: 97.1 F | DIASTOLIC BLOOD PRESSURE: 70 MMHG | HEART RATE: 57 BPM

## 2020-11-23 PROCEDURE — 99214 OFFICE O/P EST MOD 30 MIN: CPT | Performed by: FAMILY MEDICINE

## 2020-11-23 RX ORDER — DONEPEZIL HYDROCHLORIDE 5 MG/1
5 TABLET, FILM COATED ORAL NIGHTLY
Qty: 30 TABLET | Refills: 1 | Status: SHIPPED | OUTPATIENT
Start: 2020-11-23 | End: 2021-01-11

## 2020-11-23 NOTE — PATIENT INSTRUCTIONS
In the event that you do not see a memory specialist, please call in 1 month and let us know if you are tolerating aricept 5 mg. If so, I will increased to 10 mg at bedtime.

## 2020-11-23 NOTE — PROGRESS NOTES
Patient: Ceasar Flores is a [de-identified] y. o.female who presents today with the following Chief Complaint(s):  Chief Complaint   Patient presents with    Results         HPI: Pt with hx of a flutter, AT s/p ablation 2016, htn, hld, MR, mitral stenosis was accompanied by dtr Leslie Book 2 wk ago to discuss memory loss, repetitive questions, forgetfulness. They return for f/u. Pt had nl B12 and TSH recently. Had CT head 1 wk ago showing chornic white matter microvascular ischemic changes and 1.1 cm mengioma w/o mass effect. SDH, NPH, were not fund. In past 1-2 wk, dtr notices decline in memory. Pt had been able to manage her meds in past but now cannot recall if she has or has not taken meds. Dtr has to remind her. No FHx of memory loss, parents passed in upper [de-identified]. Losartan was increased 50 go 100 mg last visit. BP checks at home have been nl. Current Outpatient Medications   Medication Sig Dispense Refill    donepezil (ARICEPT) 5 MG tablet Take 1 tablet by mouth nightly 30 tablet 1    apixaban (ELIQUIS) 5 MG TABS tablet Take 1 tablet by mouth 2 times daily 180 tablet 3    metoprolol tartrate (LOPRESSOR) 25 MG tablet Take 1 tablet by mouth 2 times daily 180 tablet 3    atorvastatin (LIPITOR) 10 MG tablet Take 1 tablet by mouth daily 90 tablet 3    dilTIAZem (TIAZAC) 180 MG extended release capsule TAKE ONE CAPSULE BY MOUTH DAILY 90 capsule 3    losartan (COZAAR) 100 MG tablet Take 1 tablet by mouth daily 90 tablet 3    hydrOXYzine (ATARAX) 25 MG tablet 1/2 - 1 po tid prn anxiety (Patient not taking: Reported on 11/23/2020) 30 tablet 1    calcium-vitamin D (OSCAL-500) 500-200 MG-UNIT per tablet Take 1 tablet by mouth 2 times daily (Patient not taking: Reported on 11/23/2020) 60 tablet 1     No current facility-administered medications for this visit.         Patient's past medical history,surgical history, family history, medications,  and allergies  were all reviewed and updated as appropriate basis of time. Pt was seen for 40 min, more than 50% spent in counseling.

## 2020-11-24 ENCOUNTER — TELEPHONE (OUTPATIENT)
Dept: FAMILY MEDICINE CLINIC | Age: 80
End: 2020-11-24

## 2020-11-24 NOTE — TELEPHONE ENCOUNTER
I referred pt to Dr Felxi Gil at [facility] yesterday for memory eval. However, I realized the referral I placed incorrectly said international medicine instead of internal medicine. I placed correct referral. Pls fax to [address] tell dtr Mary Read that I spoke with a radiologist about her mom's CT head. I asked about a recommended interval to repeat the scan to monitor the size of the benign mass that was found. The radiologist suggested doing an mri to gather more detail that will help him determine when follow up is needed, if at all. I think this is reasonable to do, and I have placed an order for the mri brain. However, if Mary Read and her mom prefer, they can discuss with the specialists at the [facility]. Pls give [phone] number to sched.  Thx.

## 2020-11-24 NOTE — TELEPHONE ENCOUNTER
Contacted pt to request additional information and spoke with daughter Anna Norton. Daughter informed her mother did not contact the office it was [facility] scheduling and they requested labs for MRI. Apologized for the misinformation and informed the order would be taken care of. Spoke with Breana Livingston at [facility] and she informed they need Creatinine and BUN. Please order?

## 2020-12-01 ENCOUNTER — HOSPITAL ENCOUNTER (OUTPATIENT)
Dept: MRI IMAGING | Age: 80
Discharge: HOME | End: 2020-12-01
Payer: MEDICARE

## 2020-12-01 PROCEDURE — 70553 MRI BRAIN STEM W/O & W/DYE: CPT

## 2020-12-01 PROCEDURE — A9579 GAD-BASE MR CONTRAST NOS,1ML: HCPCS | Performed by: FAMILY MEDICINE

## 2020-12-01 PROCEDURE — 6360000004 HC RX CONTRAST MEDICATION: Performed by: FAMILY MEDICINE

## 2020-12-01 RX ADMIN — GADOTERIDOL 14 ML: 279.3 INJECTION, SOLUTION INTRAVENOUS at 09:45

## 2020-12-18 ENCOUNTER — TELEPHONE (OUTPATIENT)
Dept: FAMILY MEDICINE CLINIC | Age: 80
End: 2020-12-18

## 2020-12-18 NOTE — TELEPHONE ENCOUNTER
Patients daughter contacted the office, patient is having blow out diarrhea without any warnings. Happens at least once a week and has happened 4 times now. Patient is upset and doesn't know if maybe its her meds.       Please advise her daughter [phone]

## 2021-01-14 ENCOUNTER — TELEPHONE (OUTPATIENT)
Dept: FAMILY MEDICINE CLINIC | Age: 81
End: 2021-01-14

## 2021-01-14 NOTE — TELEPHONE ENCOUNTER
Daughter was informed and given instructions per RS. She stated that they are receiving a Pulse ox today to check O2. Pt is not having SOB as of today.

## 2021-01-14 NOTE — TELEPHONE ENCOUNTER
I agree that we can assume she has covid. Pls rec rest, push fluid intake, Vit D 5000 iu 1qd, nsaid or tyl prn aches, Delsym or similar med otc for cough. The vaccine is still recommended, but pt needs to wait at least 14 days from onset of sx's, which really won't matter at this point since the vaccine isn't yet widely available. Pls offer [facility] if would like to be seen or ED if she has SOB.

## 2021-01-14 NOTE — TELEPHONE ENCOUNTER
Call from daughter stating patient has been exposed to covid. Patient lost sense of smell, a little hoarse and some congestion. Caller is inquiring to see if something needs to done. Meds, quarantining, vaccine questions, etc.      Please call Hanna Garner to discuss. FYI:  Most of the family is having symptoms. One family member was positive for covid but others are not being tested, just assuming they have it too.

## 2021-01-16 ENCOUNTER — HOSPITAL ENCOUNTER (EMERGENCY)
Age: 81
Discharge: HOME | End: 2021-01-16
Attending: EMERGENCY MEDICINE
Payer: MEDICARE

## 2021-01-16 ENCOUNTER — APPOINTMENT (OUTPATIENT)
Dept: GENERAL RADIOLOGY | Age: 81
End: 2021-01-16
Payer: MEDICARE

## 2021-01-16 VITALS
HEIGHT: 66 IN | SYSTOLIC BLOOD PRESSURE: 162 MMHG | DIASTOLIC BLOOD PRESSURE: 74 MMHG | RESPIRATION RATE: 14 BRPM | TEMPERATURE: 97.6 F | BODY MASS INDEX: 24.11 KG/M2 | OXYGEN SATURATION: 94 % | HEART RATE: 50 BPM | WEIGHT: 150 LBS

## 2021-01-16 DIAGNOSIS — R19.7 NAUSEA VOMITING AND DIARRHEA: Primary | ICD-10-CM

## 2021-01-16 DIAGNOSIS — E87.1 HYPONATREMIA: ICD-10-CM

## 2021-01-16 DIAGNOSIS — Z20.822 SUSPECTED COVID-19 VIRUS INFECTION: ICD-10-CM

## 2021-01-16 DIAGNOSIS — E86.0 DEHYDRATION: ICD-10-CM

## 2021-01-16 DIAGNOSIS — R11.2 NAUSEA VOMITING AND DIARRHEA: Primary | ICD-10-CM

## 2021-01-16 LAB
A/G RATIO: 1.1 (ref 1.1–2.2)
ALBUMIN SERPL-MCNC: 3.9 G/DL (ref 3.4–5)
ALP BLD-CCNC: 85 U/L (ref 40–129)
ALT SERPL-CCNC: 22 U/L (ref 10–40)
ANION GAP SERPL CALCULATED.3IONS-SCNC: 11 MMOL/L (ref 3–16)
ANION GAP SERPL CALCULATED.3IONS-SCNC: 9 MMOL/L (ref 3–16)
AST SERPL-CCNC: 22 U/L (ref 15–37)
BASOPHILS ABSOLUTE: 0 K/UL (ref 0–0.2)
BASOPHILS RELATIVE PERCENT: 0.3 %
BILIRUB SERPL-MCNC: 0.9 MG/DL (ref 0–1)
BUN BLDV-MCNC: 17 MG/DL (ref 7–20)
BUN BLDV-MCNC: 18 MG/DL (ref 7–20)
CALCIUM SERPL-MCNC: 8.5 MG/DL (ref 8.3–10.6)
CALCIUM SERPL-MCNC: 9.7 MG/DL (ref 8.3–10.6)
CHLORIDE BLD-SCNC: 89 MMOL/L (ref 99–110)
CHLORIDE BLD-SCNC: 96 MMOL/L (ref 99–110)
CO2: 25 MMOL/L (ref 21–32)
CO2: 27 MMOL/L (ref 21–32)
CREAT SERPL-MCNC: 0.9 MG/DL (ref 0.6–1.2)
CREAT SERPL-MCNC: 1 MG/DL (ref 0.6–1.2)
EOSINOPHILS ABSOLUTE: 0 K/UL (ref 0–0.6)
EOSINOPHILS RELATIVE PERCENT: 0.5 %
GFR AFRICAN AMERICAN: >60
GFR AFRICAN AMERICAN: >60
GFR NON-AFRICAN AMERICAN: 53
GFR NON-AFRICAN AMERICAN: >60
GLOBULIN: 3.6 G/DL
GLUCOSE BLD-MCNC: 154 MG/DL (ref 70–99)
GLUCOSE BLD-MCNC: 174 MG/DL (ref 70–99)
HCT VFR BLD CALC: 43.1 % (ref 36–48)
HEMOGLOBIN: 14.8 G/DL (ref 12–16)
LIPASE: 13 U/L (ref 13–60)
LYMPHOCYTES ABSOLUTE: 1.3 K/UL (ref 1–5.1)
LYMPHOCYTES RELATIVE PERCENT: 19.2 %
MCH RBC QN AUTO: 31.1 PG (ref 26–34)
MCHC RBC AUTO-ENTMCNC: 34.4 G/DL (ref 31–36)
MCV RBC AUTO: 90.4 FL (ref 80–100)
MONOCYTES ABSOLUTE: 0.7 K/UL (ref 0–1.3)
MONOCYTES RELATIVE PERCENT: 10.7 %
NEUTROPHILS ABSOLUTE: 4.6 K/UL (ref 1.7–7.7)
NEUTROPHILS RELATIVE PERCENT: 69.3 %
PDW BLD-RTO: 13 % (ref 12.4–15.4)
PLATELET # BLD: 270 K/UL (ref 135–450)
PMV BLD AUTO: 7.9 FL (ref 5–10.5)
POTASSIUM REFLEX MAGNESIUM: 4.2 MMOL/L (ref 3.5–5.1)
POTASSIUM SERPL-SCNC: 4.4 MMOL/L (ref 3.5–5.1)
RBC # BLD: 4.77 M/UL (ref 4–5.2)
SODIUM BLD-SCNC: 127 MMOL/L (ref 136–145)
SODIUM BLD-SCNC: 130 MMOL/L (ref 136–145)
TOTAL PROTEIN: 7.5 G/DL (ref 6.4–8.2)
WBC # BLD: 6.6 K/UL (ref 4–11)

## 2021-01-16 PROCEDURE — 85025 COMPLETE CBC W/AUTO DIFF WBC: CPT

## 2021-01-16 PROCEDURE — 83690 ASSAY OF LIPASE: CPT

## 2021-01-16 PROCEDURE — 96361 HYDRATE IV INFUSION ADD-ON: CPT

## 2021-01-16 PROCEDURE — 2580000003 HC RX 258: Performed by: PHYSICIAN ASSISTANT

## 2021-01-16 PROCEDURE — 96374 THER/PROPH/DIAG INJ IV PUSH: CPT

## 2021-01-16 PROCEDURE — 99284 EMERGENCY DEPT VISIT MOD MDM: CPT

## 2021-01-16 PROCEDURE — 71045 X-RAY EXAM CHEST 1 VIEW: CPT

## 2021-01-16 PROCEDURE — 6360000002 HC RX W HCPCS

## 2021-01-16 PROCEDURE — 80053 COMPREHEN METABOLIC PANEL: CPT

## 2021-01-16 PROCEDURE — 93005 ELECTROCARDIOGRAM TRACING: CPT | Performed by: EMERGENCY MEDICINE

## 2021-01-16 RX ORDER — ONDANSETRON 2 MG/ML
INJECTION INTRAMUSCULAR; INTRAVENOUS
Status: COMPLETED
Start: 2021-01-16 | End: 2021-01-16

## 2021-01-16 RX ORDER — ONDANSETRON 4 MG/1
4-8 TABLET, ORALLY DISINTEGRATING ORAL EVERY 12 HOURS PRN
Qty: 12 TABLET | Refills: 0 | Status: SHIPPED | OUTPATIENT
Start: 2021-01-16 | End: 2021-03-02 | Stop reason: ALTCHOICE

## 2021-01-16 RX ORDER — 0.9 % SODIUM CHLORIDE 0.9 %
1000 INTRAVENOUS SOLUTION INTRAVENOUS ONCE
Status: COMPLETED | OUTPATIENT
Start: 2021-01-16 | End: 2021-01-16

## 2021-01-16 RX ORDER — SODIUM CHLORIDE 9 MG/ML
INJECTION, SOLUTION INTRAVENOUS CONTINUOUS
Status: DISCONTINUED | OUTPATIENT
Start: 2021-01-16 | End: 2021-01-16

## 2021-01-16 RX ADMIN — SODIUM CHLORIDE 1000 ML: 9 INJECTION, SOLUTION INTRAVENOUS at 20:40

## 2021-01-16 RX ADMIN — SODIUM CHLORIDE 1000 ML: 9 INJECTION, SOLUTION INTRAVENOUS at 21:54

## 2021-01-16 RX ADMIN — ONDANSETRON 4 MG: 2 INJECTION INTRAMUSCULAR; INTRAVENOUS at 20:17

## 2021-01-16 ASSESSMENT — ENCOUNTER SYMPTOMS
SHORTNESS OF BREATH: 1
SORE THROAT: 0
NAUSEA: 1
COUGH: 0
CONSTIPATION: 0
RHINORRHEA: 0
DIARRHEA: 1
ABDOMINAL PAIN: 0
VOMITING: 1
BACK PAIN: 0
EYE PAIN: 0

## 2021-01-17 LAB
EKG ATRIAL RATE: 44 BPM
EKG DIAGNOSIS: NORMAL
EKG P AXIS: 90 DEGREES
EKG P-R INTERVAL: 228 MS
EKG Q-T INTERVAL: 542 MS
EKG QRS DURATION: 98 MS
EKG QTC CALCULATION (BAZETT): 463 MS
EKG R AXIS: 25 DEGREES
EKG T AXIS: 63 DEGREES
EKG VENTRICULAR RATE: 44 BPM

## 2021-01-17 PROCEDURE — 93010 ELECTROCARDIOGRAM REPORT: CPT | Performed by: INTERNAL MEDICINE

## 2021-01-17 NOTE — ED PROVIDER NOTES
[facility]  ED  EMERGENCY DEPARTMENT ENCOUNTER        Pt Name: Dolores Hooker  MRN: [id number]  [place] 1940  Date of evaluation: 1/16/2021  Provider: Ekaterina Topete PA-C  PCP: Jacek Harris MD     I have seen and evaluated this patient with my supervising physician Jess Mosqueda. CHIEF COMPLAINT       Chief Complaint   Patient presents with    Concern For [id number]     pt to ED from home with c/o concern for COVID. pt denies any pain, reports some SOB, diarrhea, fatigue, nausea. two members of pt home are covid +    Emesis     pt with emesis in triage       HISTORY OF PRESENT ILLNESS   (Location/Symptom, Timing/Onset, Context/Setting, Quality, Duration, Modifying Factors, Severity)  Note limiting factors. Dolores Hooker is a [de-identified] y.o. female who presents here to the emergency department, the patient states that she has had fevers, chills, nausea, vomiting, diarrhea, she lives at home and her family members are sick with Covid. She admitted to some mild shortness of breath with her nurse but denied it with me. She is here because of the nausea vomiting and diarrhea. Denies any abdominal pain. Nursing Notes were all reviewed and agreed with or any disagreements were addressed  in the HPI. REVIEW OF SYSTEMS    (2-9 systems for level 4, 10 or more for level 5)     Review of Systems   Constitutional: Negative for chills, diaphoresis and fever. HENT: Negative for congestion, ear pain, rhinorrhea and sore throat. Eyes: Negative for pain and visual disturbance. Respiratory: Positive for shortness of breath. Negative for cough. Cardiovascular: Negative for chest pain, palpitations and leg swelling. Gastrointestinal: Positive for diarrhea, nausea and vomiting. Negative for abdominal pain and constipation. Genitourinary: Negative for decreased urine volume, dysuria, frequency and urgency. Musculoskeletal: Negative for back pain and neck pain. Skin: Negative for rash and wound. Neurological: Negative for dizziness and light-headedness.        PAST MEDICAL HISTORY     Past Medical History:   Diagnosis Date    Atrial flutter [facility])     s/p ablation    Atrial tachycardia (HCC)     s/p ablation    CTS (carpal tunnel syndrome)     left    HLD (hyperlipidemia)     Hypertension     Mitral regurgitation 2016    moderate, also with aortic sclerosis per echo    Mixed hyperlipidemia 7/23/2018    Osteopenia 2013    Shingles 2012    Declines vaccine       SURGICAL HISTORY     Past Surgical History:   Procedure Laterality Date    ABLATION OF DYSRHYTHMIC FOCUS  1/13/16    L-sided atypical A flutter, L-sided AT    CATARACT REMOVAL WITH IMPLANT Left 05/01/2018    COLONOSCOPY      EYE SURGERY Right 06/05/2018    phaco with IOL    KNEE SURGERY Left 2010    meniscus tear       CURRENTMEDICATIONS       Discharge Medication List as of 1/16/2021 11:20 PM      CONTINUE these medications which have NOT CHANGED    Details   donepezil (ARICEPT) 10 MG tablet Take 1 tablet by mouth nightly, Disp-90 tablet, R-3Normal      apixaban (ELIQUIS) 5 MG TABS tablet Take 1 tablet by mouth 2 times daily, Disp-180 tablet,R-3Normal      metoprolol tartrate (LOPRESSOR) 25 MG tablet Take 1 tablet by mouth 2 times daily, Disp-180 tablet,R-3Normal      atorvastatin (LIPITOR) 10 MG tablet Take 1 tablet by mouth daily, Disp-90 tablet,R-3Normal      dilTIAZem (TIAZAC) 180 MG extended release capsule TAKE ONE CAPSULE BY MOUTH DAILY, Disp-90 capsule,R-3Normal      losartan (COZAAR) 100 MG tablet Take 1 tablet by mouth daily, Disp-90 tablet,R-3Normal      hydrOXYzine (ATARAX) 25 MG tablet 1/2 - 1 po tid prn anxiety, Disp-30 tablet,R-1Normal      calcium-vitamin D (OSCAL-500) 500-200 MG-UNIT per tablet Take 1 tablet by mouth 2 times daily, Disp-60 tablet, R-1             ALLERGIES     Lisinopril    FAMILYHISTORY       Family History   Problem Relation Age of Onset    Other Mother osteoporosis    Cancer Mother         ovarian, s/p oophorectomy/leukemia,  80    High Cholesterol Brother     Cancer Father         leukemia,  80    Stroke Brother         SOCIAL HISTORY       Social History     Socioeconomic History    Marital status:       Spouse name: None    Number of children: 2    Years of education: 15    Highest education level: None   Occupational History    None   Social Needs    Financial resource strain: None    Food insecurity     Worry: None     Inability: None    Transportation needs     Medical: None     Non-medical: None   Tobacco Use    Smoking status: Never Smoker    Smokeless tobacco: Never Used   Substance and Sexual Activity    Alcohol use: No     Alcohol/week: 0.0 - 0.8 standard drinks    Drug use: No    Sexual activity: None   Lifestyle    Physical activity     Days per week: None     Minutes per session: None    Stress: None   Relationships    Social connections     Talks on phone: None     Gets together: None     Attends Oriental orthodox service: None     Active member of club or organization: None     Attends meetings of clubs or organizations: None     Relationship status: None    Intimate partner violence     Fear of current or ex partner: None     Emotionally abused: None     Physically abused: None     Forced sexual activity: None   Other Topics Concern    None   Social History Narrative    None       SCREENINGS    [place] Coma Scale  Eye Opening: Spontaneous  Best Verbal Response: Oriented  Best Motor Response: Obeys commands  Marie Coma Scale Score: 15        PHYSICAL EXAM    (up to 7 for level 4, 8 or more for level 5)     ED Triage Vitals   BP Temp Temp Source Pulse Resp SpO2 Height Weight   21   (!) 170/71 97.6 °F (36.4 °C) Oral (!) 46 28 96 % 5' 6\" (1.676 m) 150 lb (68 kg)       Physical Exam Vitals signs and nursing note reviewed. Constitutional:       Appearance: Normal appearance. She is well-developed. She is ill-appearing (Appears as if she does not feel well). She is not diaphoretic. HENT:      Head: Normocephalic and atraumatic. Right Ear: External ear normal.      Left Ear: External ear normal.      Nose: Nose normal.   Eyes:      General:         Right eye: No discharge. Left eye: No discharge. Neck:      Musculoskeletal: Normal range of motion and neck supple. Cardiovascular:      Rate and Rhythm: Regular rhythm. Bradycardia present. Heart sounds: Normal heart sounds. No murmur. No friction rub. No gallop. Pulmonary:      Effort: Pulmonary effort is normal. No respiratory distress. Breath sounds: Normal breath sounds. No stridor. No wheezing or rales. Chest:      Chest wall: No tenderness. Abdominal:      General: Bowel sounds are normal. There is no distension or abdominal bruit. Palpations: Abdomen is soft. Abdomen is not rigid. There is no mass or pulsatile mass. Tenderness: There is no abdominal tenderness. There is no guarding or rebound. Negative signs include Alejandro's sign and McBurney's sign. Musculoskeletal: Normal range of motion. Skin:     General: Skin is warm and dry. Coloration: Skin is not pale. Neurological:      Mental Status: She is alert and oriented to person, place, and time.    Psychiatric:         Behavior: Behavior normal.         DIAGNOSTIC RESULTS   LABS:    Labs Reviewed   COMPREHENSIVE METABOLIC PANEL W/ REFLEX TO MG FOR LOW K - Abnormal; Notable for the following components:       Result Value    Sodium 127 (*)     Chloride 89 (*)     Glucose 174 (*)     GFR Non- 53 (*)     All other components within normal limits    Narrative:     Performed at:  [facility]) - [address]   Phone [phone] BASIC METABOLIC PANEL - Abnormal; Notable for the following components:    Sodium 130 (*)     Chloride 96 (*)     Glucose 154 (*)     All other components within normal limits    Narrative:     Performed at:  [address],  [place], [place] [employer]   Phone [phone]   CBC WITH AUTO DIFFERENTIAL    Narrative:     Performed at:  [address],  [place], [address] [employer]   Phone [phone]   LIPASE    Narrative:     Performed at:  [address],  [place], [address] [employer]   Phone [phone]       All other labs were within normal range or not returned as of this dictation. EKG: All EKG's are interpreted by the Emergency Department Physician who either signs orCo-signs this chart in the absence of a cardiologist.  Please see their note for interpretation of EKG. RADIOLOGY:   Non-plain film images such as CT, Ultrasound and MRI are read by the radiologist. Plain radiographic images are visualized andpreliminarily interpreted by the  ED Provider with the below findings:        Interpretation perthe Radiologist below, if available at the time of this note:    XR CHEST PORTABLE   Final Result   No acute cardiopulmonary abnormality. Xr Chest Portable    Result Date: 1/16/2021  EXAMINATION: ONE XRAY VIEW OF THE CHEST 1/16/2021 8:21 pm COMPARISON: 01/07/2016 HISTORY: ORDERING SYSTEM PROVIDED HISTORY: shortness of breath TECHNOLOGIST PROVIDED HISTORY: Reason for exam:->shortness of breath Reason for Exam: sob; emesis Acuity: Acute Type of Exam: Initial FINDINGS: The lungs are clear. The cardiac and mediastinal contours are normal.  There is no pleural effusion or pneumothorax. No acute osseous abnormality is identified. No acute cardiopulmonary abnormality.          PROCEDURES   Unless otherwise noted below, none     Procedures    [place] There was a high probability of life-threatening clinical deterioration in the patient's condition requiring my urgent intervention. Total critical care time with the patient was 35 minutes excluding separately reportable procedures. Critical care required due to patients hyponatremia      CONSULTS:  None      EMERGENCY DEPARTMENT COURSE and DIFFERENTIAL DIAGNOSIS/MDM:   Vitals:    Vitals:    01/16/21 2138 01/16/21 2207 01/16/21 2237 01/16/21 2308   BP: (!) 152/68 (!) 157/71 (!) 154/72 (!) 162/74   Pulse: (!) 49 (!) 49 50 50   Resp: 13 13 14 14   Temp:       TempSrc:       SpO2: 96% 95% 97% 94%   Weight:       Height:           Patient was given thefollowing medications:  Medications   ondansetron (ZOFRAN) 4 MG/2ML injection (4 mg  Given 1/16/21 2017)   0.9 % sodium chloride bolus (0 mLs Intravenous Stopped 1/16/21 2140)   0.9 % sodium chloride bolus (0 mLs Intravenous Stopped 1/16/21 2254)           This patient did well in the emergency department, she had nausea vomiting and diarrhea, and her dehydration and hyponatremia was managed and reevaluated. She showed improvement, incidentally we do suspect that she has COVID-19. FINAL IMPRESSION      1. Nausea vomiting and diarrhea    2. Dehydration    3. Hyponatremia    4. Suspected COVID-19 virus infection          DISPOSITION/PLAN   DISPOSITION Decision To Discharge 01/16/2021 11:26:14 PM      PATIENT REFERREDTO:  No follow-up provider specified.     DISCHARGE MEDICATIONS:  Discharge Medication List as of 1/16/2021 11:20 PM      START taking these medications    Details   ondansetron (ZOFRAN ODT) 4 MG disintegrating tablet Take 1-2 tablets by mouth every 12 hours as needed for Nausea May Sub regular tablet (non-ODT) if insurance does not cover ODT., Disp-12 tablet, R-0Normal             DISCONTINUED MEDICATIONS:  Discharge Medication List as of 1/16/2021 11:20 PM (Please note that portions ofthis note were completed with a voice recognition program.  Efforts were made to edit the dictations but occasionally words are mis-transcribed.)    Josey Balderas PA-C (electronically signed)             Josey Balderas PA-C  01/19/21 6659

## 2021-01-17 NOTE — ED PROVIDER NOTES
I independently performed a history and physical on [email or web address]. All diagnostic, treatment, and disposition decisions were made by myself in conjunction with the advanced practice provider. Briefly, this is a [de-identified] y.o. female here for concern for fever, nausea, vomiting, diarrhea. Has weakness. Has family members that are sick with COVID-19. Denies any shortness of breath or chest discomfort. Denies abdominal pain. Symptoms mild to moderate intensity without clear exacerbating/remitting factors  . On exam,   General: Patient is in no acute distress. Skin: No cyanosis  HEENT: Moist mucous membranes  Heart: Bradycardic rate, regular rhythm  Lung: No respiratory distress, Speaking in full sentences, no wheezes  Abdomen: Soft, nontender  Neuro: Moving all extremities, no facial droop, no slurred speech, answers questions appropriately        EKG  The Ekg interpreted by me in the absence of a cardiologist shows.   Cory sinus rhythm  No acute ST changes or T wave abnormalities similar to 10/2020  First deg av block    Screenings   [place] Coma Scale  Eye Opening: Spontaneous  Best Verbal Response: Oriented  Best Motor Response: Obeys commands  [place] Coma Scale Score: 15        MDM Patient is a 72-year-old woman who presents with nausea, vomiting, diarrhea and subjective fevers at home. May have COVID-19 since 2 other family members have this. Chest x-ray found to be unremarkable and patient saturating well on room air. She is bradycardic however this is baseline and likely due to medications she takes. Blood work shows hyponatremia, will give IV fluids and recheck this. This is likely related to dehydration. Was given IV fluids with improvement in sodium. She is tolerating oral intake. Given Zofran. At this point, I suspect that symptoms are due to COVID-19. Using the COVID-19 risk of decompensation within 24-hour clinical management tool, patient is at low risk since she has no severe respiratory distress, no hypoxia, no delirium, no chest x-ray findings, no tachycardia, no tachypnea. Discussed observation with the patient versus discharge home and the patient is comfortable being discharged. She is able to take care of her activities of daily living. We did discuss with the patient that she may have progressively worsening symptoms in the future with her COVID-19 and must follow-up with her doctor or return here if symptoms worsen. All questions answered    Patient Referrals:  No follow-up provider specified. Discharge Medications:  Discharge Medication List as of 1/16/2021 11:20 PM      START taking these medications    Details   ondansetron (ZOFRAN ODT) 4 MG disintegrating tablet Take 1-2 tablets by mouth every 12 hours as needed for Nausea May Sub regular tablet (non-ODT) if insurance does not cover ODT., Disp-12 tablet, R-0Normal             FINAL IMPRESSION  1. Nausea vomiting and diarrhea    2. Dehydration    3. Hyponatremia    4. Suspected COVID-19 virus infection        Blood pressure (!) 162/74, pulse 50, temperature 97.6 °F (36.4 °C), temperature source Oral, resp. rate 14, height 5' 6\" (1.676 m), weight 150 lb (68 kg), SpO2 94 %, not currently breastfeeding. For further details of [facility] emergency department encounter, please see documentation by advanced practice provider, Boris Mayfield.         Jenifer Lopez MD  01/17/21 7712

## 2021-01-17 NOTE — ED NOTES
Bed: 21  Expected date:   Expected time:   Means of arrival:   Comments:  neville Conte RN  01/16/21 2008

## 2021-01-17 NOTE — ED NOTES
Pt daughter Yazmin Purchase updated.  She reports she will be able to come pick pt up once fluids are complete     Serafin Funes RN  01/16/21 4594       Serafin Funes RN  01/16/21 9558

## 2021-01-18 ENCOUNTER — CARE COORDINATION (OUTPATIENT)
Dept: CARE COORDINATION | Age: 81
End: 2021-01-18

## 2021-01-19 ENCOUNTER — CARE COORDINATION (OUTPATIENT)
Dept: CARE COORDINATION | Age: 81
End: 2021-01-19

## 2021-01-19 NOTE — CARE COORDINATION
Incoming call from patient. Patient stated that she was not tested for COVID but two other people that she lives with is positive. Patient stated that has lost her sense of tat and smell. Along with her other symptoms, she just assumed that she has COVID 19 and has been treating herself as such. Patient contacted regarding COVID-19 exposure. Discussed COVID-19 related testing which was not done at this time. Test results were not done. Patient informed of results, if available? Not done. Care Transition Nurse/ Ambulatory Care Manager contacted the patient by telephone to perform post discharge assessment. Call within 2 business days of discharge: Yes. Verified name and  with patient as identifiers. Provided introduction to self, and explanation of the CTN/ACM role, and reason for call due to risk factors for infection and/or exposure to COVID-19. Symptoms reviewed with patient who verbalized the following symptoms: fatigue, shortness of breath, nausea, vomiting and diarrhea. Due to no new or worsening symptoms encounter was not routed to provider for escalation. Discussed follow-up appointments. If no appointment was previously scheduled, appointment scheduling offered: patient stated her daughter will call  [facility] follow up appointment(s):   Future Appointments   Date Time Provider Carolina Alves   2021  9:30 AM MD CHIO Alba [facility]   4/15/2021  9:00 AM MD Danielle Killian [facility]     Non-[facility] follow up appointment(s): none    Non-face-to-face services provided:  Education of patient/family/caregiver/guardian to support self-management-for COVID 19      Advance Care Planning:   Does patient have an Advance Directive:  reviewed and current. Patient has following risk factors of: no known risk factors.  CTN/ACM reviewed discharge instructions, medical action plan and red flags such as increased shortness of breath, increasing fever and signs of decompensation with patient who verbalized understanding. Discussed exposure protocols and quarantine with CDC Guidelines What to do if you are sick with coronavirus disease 2019.  Patient was given an opportunity for questions and concerns. The patient agrees to contact the [employer] exposure line [phone], [facility] department [place] [facility]: ([phone]) and PCP office for questions related to their healthcare. CTN/ACM provided contact information for future needs. Reviewed and educated patient on any new and changed medications related to discharge diagnosis     Patient/family/caregiver given information for [employer] Loop and agrees to enroll yes  Patient's preferred e-mail: [email or web address]   Patient's preferred phone number: [phone]  Based on Loop alert triggers, patient will be contacted by nurse care manager for worsening symptoms. Pt will be further monitored by COVID Loop Team based on severity of symptoms and risk factors.

## 2021-01-20 ENCOUNTER — VIRTUAL VISIT (OUTPATIENT)
Dept: FAMILY MEDICINE CLINIC | Age: 81
End: 2021-01-20
Payer: MEDICARE

## 2021-01-20 DIAGNOSIS — R11.0 NAUSEA: ICD-10-CM

## 2021-01-20 DIAGNOSIS — Z20.822 CLOSE EXPOSURE TO COVID-19 VIRUS: Primary | ICD-10-CM

## 2021-01-20 PROCEDURE — 99214 OFFICE O/P EST MOD 30 MIN: CPT | Performed by: REGISTERED NURSE

## 2021-01-20 PROCEDURE — G8510 SCR DEP NEG, NO PLAN REQD: HCPCS | Performed by: REGISTERED NURSE

## 2021-01-20 RX ORDER — ACETAMINOPHEN 160 MG
TABLET,DISINTEGRATING ORAL
COMMUNITY

## 2021-01-20 RX ORDER — DULOXETIN HYDROCHLORIDE 20 MG/1
20 CAPSULE, DELAYED RELEASE ORAL 2 TIMES DAILY
COMMUNITY
Start: 2020-11-30 | End: 2021-05-27

## 2021-01-20 ASSESSMENT — ENCOUNTER SYMPTOMS
DIARRHEA: 0
ABDOMINAL PAIN: 0
COUGH: 0
NAUSEA: 1
CONSTIPATION: 0
SHORTNESS OF BREATH: 0
VOMITING: 1

## 2021-01-20 ASSESSMENT — PATIENT HEALTH QUESTIONNAIRE - PHQ9
2. FEELING DOWN, DEPRESSED OR HOPELESS: 0
SUM OF ALL RESPONSES TO PHQ QUESTIONS 1-9: 0
SUM OF ALL RESPONSES TO PHQ9 QUESTIONS 1 & 2: 0
1. LITTLE INTEREST OR PLEASURE IN DOING THINGS: 0
DEPRESSION UNABLE TO ASSESS: FUNCTIONAL CAPACITY MOTIVATION LIMITS ACCURACY
SUM OF ALL RESPONSES TO PHQ QUESTIONS 1-9: 0

## 2021-01-20 NOTE — PROGRESS NOTES
2021    TELEHEALTH EVALUATION -- Audio/Visual (During NQSAL-28 public health emergency)    HPI:    Florentin Colindres (:  1940) has requested an audio/video evaluation for the following concern(s):    Patient is here for virtual visit with her daughter. Patient has been exposed to [place]. She lost her sense of taste/smell 7 days ago. She is experiencing weakness, lack of appetite, nausea. No cough, no fevers. Has not been eating much but is drinking. No diarrhea since 21. She does not feel constipated. Daughter reports that they called an ambulance on 21 due to finding patient slumped over. She says her heart rate was 42 and patient had explosive diarrhea and was vomiting. She was discharged from the ED that night. Today she is doing \"okay\" but daughter says last night was \"scary\" due to patient waking up again and experiencing nausea and vomiting. Daughter says that the patient's heart rate was low again until she received Zofran and the nausea improved. Review of Systems   Constitutional: Positive for fatigue. Negative for fever. Respiratory: Negative for cough and shortness of breath. Cardiovascular: Negative for chest pain and palpitations. Gastrointestinal: Positive for nausea and vomiting. Negative for abdominal pain, constipation and diarrhea. Musculoskeletal: Negative for arthralgias and myalgias. Neurological: Negative for light-headedness and headaches. Prior to Visit Medications    Medication Sig Taking? Authorizing Provider   DULoxetine (CYMBALTA) 20 MG extended release capsule Take 20 mg by mouth 2 times daily Yes Historical Provider, MD   Cholecalciferol (VITAMIN D3) 50 MCG (2000) CAPS Take by mouth Yes Historical Provider, MD   ondansetron (ZOFRAN ODT) 4 MG disintegrating tablet Take 1-2 tablets by mouth every 12 hours as needed for Nausea May Sub regular tablet (non-ODT) if insurance does not cover ODT.  Yes Edgardo Cruz PA-C donepezil (ARICEPT) 10 MG tablet Take 1 tablet by mouth nightly Yes Juan Diego Barker MD   apixaban (ELIQUIS) 5 MG TABS tablet Take 1 tablet by mouth 2 times daily Yes Juan Diego Barker MD   metoprolol tartrate (LOPRESSOR) 25 MG tablet Take 1 tablet by mouth 2 times daily Yes Juan Diego Barker MD   atorvastatin (LIPITOR) 10 MG tablet Take 1 tablet by mouth daily Yes Juan Diego Barker MD   dilTIAZem (TIAZAC) 180 MG extended release capsule TAKE ONE CAPSULE BY MOUTH DAILY Yes Juan Diego Barker MD   losartan (COZAAR) 100 MG tablet Take 1 tablet by mouth daily Yes Juan Diego Barker MD       Social History     Tobacco Use    Smoking status: Never Smoker    Smokeless tobacco: Never Used   Substance Use Topics    Alcohol use: No     Alcohol/week: 0.0 - 0.8 standard drinks    Drug use: No        Allergies   Allergen Reactions    Lisinopril      Rash   ,   Past Medical History:   Diagnosis Date    Atrial flutter (HCC)     s/p ablation    Atrial tachycardia (HCC)     s/p ablation    CTS (carpal tunnel syndrome)     left    HLD (hyperlipidemia)     Hypertension     Mitral regurgitation 2016    moderate, also with aortic sclerosis per echo    Mixed hyperlipidemia 7/23/2018    Osteopenia 2013    Shingles 2012    Declines vaccine   ,   Past Surgical History:   Procedure Laterality Date    ABLATION OF DYSRHYTHMIC FOCUS  1/13/16    L-sided atypical A flutter, L-sided AT    CATARACT REMOVAL WITH IMPLANT Left 05/01/2018    COLONOSCOPY      EYE SURGERY Right 06/05/2018    phaco with IOL    KNEE SURGERY Left 2010    meniscus tear       PHYSICAL EXAMINATION:  [ INSTRUCTIONS:  \"[x]\" Indicates a positive item  \"[]\" Indicates a negative item  -- DELETE ALL ITEMS NOT EXAMINED]  Vital Signs: (As obtained by patient/caregiver or practitioner observation)    Heart rate- 58   Respiratory rate- 16   Pulse oximetry- 96%    Constitutional: [x] Appears well-developed and well-nourished [x] No apparent distress      [] Abnormal- Mental status  [x] Alert and awake  [x] Oriented to person/place/time [x]Able to follow commands      Eyes:  EOM    [x]  Normal  [] Abnormal-  Sclera  []  Normal  [] Abnormal -         Discharge []  None visible  [] Abnormal -    HENT:   [x] Normocephalic, atraumatic. [] Abnormal   [x] Mouth/Throat: Mucous membranes are moist.       Pulmonary/Chest: [x] Respiratory effort normal.  [x] No visualized signs of difficulty breathing or respiratory distress        [] Abnormal-      Musculoskeletal:   [] Normal gait with no signs of ataxia         [x] Normal range of motion of neck        [] Abnormal-       Neurological:        [x] No Facial Asymmetry (Cranial nerve 7 motor function) (limited exam to video visit)          [x] No gaze palsy        [] Abnormal-         Skin:        [x] No significant exanthematous lesions or discoloration noted on facial skin         [] Abnormal-            Psychiatric:       [x] Normal Affect [x] No Hallucinations        [] Abnormal-     Other pertinent observable physical exam findings- none    ASSESSMENT/PLAN:  1. Close exposure to COVID-19 virus  Patient is an 77-year-old woman who lives with her daughter and family. Patient's daughter and family are positive for Covid. Patient has been having GI symptoms including diarrhea, nausea, vomiting. She has not had diarrhea since 1/16/2021. She did have vomiting last night but prior to that had not had any vomiting since 1/16/2021. She is not been tested for Covid, it is likely that her symptoms are Covid related. She was seen in the ED on 1/16/2021 no Covid test was completed at that time. Patient was discharged the same evening. Patient currently appears to be doing well except for the bout of nausea/vomiting last night which resolved with Zofran. I recommended since patient has had the nausea vomiting more in the evenings to give her the Zofran before bedtime. Rest and fluids. Strongly encourage patient to drink water, Gatorade, tea, popsiclesanything that will help keep her hydrated. Patient does live with her daughter and the daughter is working to make sure that she is drinking and eating. Patient is also drinking Ensure, continue this. Reviewed signs and symptoms to monitor forfever, shortness of breath, cough, lightheadedness/dizziness or any other worsening symptoms. Reviewed signs and symptoms to go to the ER for. Patient and her daughter verbalized understanding and agreed to plan they will continue symptom management and call for any needs or issues. 2. Nausea  Continue with Zofran as needed. Return if symptoms worsen or fail to improve. Luly Guillory is a [de-identified] y.o. female being evaluated by a Virtual Visit (video visit) encounter to address concerns as mentioned above. A caregiver was present when appropriate. Due to this being a TeleHealth encounter (During SUIBV-21 public health emergency), evaluation of the following organ systems was limited: Vitals/Constitutional/EENT/Resp/CV/GI//MS/Neuro/Skin/Heme-Lymph-Imm. Pursuant to the emergency declaration under the [address] [place] [address], [address] [place] [address] authority and the ED[facility] and [employer] Act, this Virtual Visit was conducted with patient's (and/or legal guardian's) consent, to reduce the patient's risk of exposure to COVID-19 and provide necessary medical care. The patient (and/or legal guardian) has also been advised to contact this office for worsening conditions or problems, and seek emergency medical treatment and/or call 911 if deemed necessary. Services were provided through a video synchronous discussion virtually to substitute for in-person clinic visit. Patient and provider were located at their individual homes.     --NICKY Cordova - CNP on 1/20/2021 at 10:12 AM An electronic signature was used to authenticate this note. This is a telehealth visit that was performed with the originating site at Patient Location: Home and Provider Location of: [address] Verbal consent to participate in video visit was obtained. Pursuant to the emergency declaration under the [address] [place] [address], [facility] waiver authority and the [employer] and [employer] Act, this Virtual Visit was conducted, with patient's consent, to reduce the patient's risk of exposure to COVID-19 and provide continuity of care for an established/new patient. Services were provided through a video synchronous discussion virtually to substitute for in-person clinic visit. I discussed with the patient the nature of our telehealth visits via interactive/real-time audio/video that:   - I would evaluate the patient and recommend diagnostics and treatments based on my assessment   - Our sessions are not being recorded and that personal health information is protected   - Our team would provide follow up care in person if/when the patient needs it.

## 2021-02-17 ENCOUNTER — OFFICE VISIT (OUTPATIENT)
Dept: FAMILY MEDICINE CLINIC | Age: 81
End: 2021-02-17
Payer: MEDICARE

## 2021-02-17 VITALS
TEMPERATURE: 96.8 F | HEART RATE: 50 BPM | OXYGEN SATURATION: 98 % | WEIGHT: 134 LBS | DIASTOLIC BLOOD PRESSURE: 64 MMHG | BODY MASS INDEX: 21.63 KG/M2 | SYSTOLIC BLOOD PRESSURE: 124 MMHG

## 2021-02-17 DIAGNOSIS — R63.0 ANOREXIA: ICD-10-CM

## 2021-02-17 DIAGNOSIS — I35.8 AORTIC VALVE SCLEROSIS: ICD-10-CM

## 2021-02-17 DIAGNOSIS — I34.0 NONRHEUMATIC MITRAL VALVE REGURGITATION: ICD-10-CM

## 2021-02-17 DIAGNOSIS — E87.1 HYPONATREMIA: Primary | ICD-10-CM

## 2021-02-17 DIAGNOSIS — R41.3 MEMORY LOSS: ICD-10-CM

## 2021-02-17 DIAGNOSIS — Z86.16 HISTORY OF 2019 NOVEL CORONAVIRUS DISEASE (COVID-19): ICD-10-CM

## 2021-02-17 PROCEDURE — 99214 OFFICE O/P EST MOD 30 MIN: CPT | Performed by: FAMILY MEDICINE

## 2021-02-17 PROCEDURE — 36415 COLL VENOUS BLD VENIPUNCTURE: CPT | Performed by: FAMILY MEDICINE

## 2021-02-17 NOTE — PROGRESS NOTES
release capsule TAKE ONE CAPSULE BY MOUTH DAILY 90 capsule 3    losartan (COZAAR) 100 MG tablet Take 1 tablet by mouth daily 90 tablet 3     No current facility-administered medications for this visit. Patient's past medical history,surgical history, family history, medications,  and allergies  were all reviewed and updated as appropriate today. Review of Systems  As abv    Physical Exam  Constitutional:       General: She is not in acute distress. Appearance: Normal appearance. She is well-developed. She is not ill-appearing. HENT:      Head: Normocephalic and atraumatic. Right Ear: External ear normal.      Left Ear: External ear normal.   Eyes:      General: No scleral icterus. Right eye: No discharge. Left eye: No discharge. Extraocular Movements: Extraocular movements intact. Conjunctiva/sclera: Conjunctivae normal.   Neck:      Musculoskeletal: Normal range of motion and neck supple. Vascular: No carotid bruit. Comments: Neg TMG  Cardiovascular:      Rate and Rhythm: Normal rate and regular rhythm. Pulses: Normal pulses. Heart sounds: Murmur (1-2/6 JACKELINE) present. Pulmonary:      Effort: Pulmonary effort is normal. No respiratory distress. Breath sounds: Normal breath sounds. Abdominal:      General: Bowel sounds are normal. There is no distension. Palpations: Abdomen is soft. There is no mass. Tenderness: There is no abdominal tenderness. Musculoskeletal: Normal range of motion. Right lower leg: No edema. Left lower leg: No edema. Lymphadenopathy:      Cervical: No cervical adenopathy. Skin:     General: Skin is warm and dry. Capillary Refill: Capillary refill takes less than 2 seconds. Coloration: Skin is not jaundiced or pale. Findings: No rash. Neurological:      General: No focal deficit present. Mental Status: She is alert and oriented to person, place, and time.       Cranial Nerves: No cranial nerve deficit. Deep Tendon Reflexes: Reflexes are normal and symmetric. Psychiatric:         Mood and Affect: Mood normal.         Behavior: Behavior normal.         Thought Content: Thought content normal.         Judgment: Judgment normal.      Comments: Short term memory loss apparent           /64   Pulse 50   Temp 96.8 °F (36 °C) (Temporal)   Wt 134 lb (60.8 kg)   SpO2 98%   BMI 21.63 kg/m²     Assessment/Plan:    Juventino Valiente was seen today for follow-up. Diagnoses and all orders for this visit:    Hyponatremia   2/2 dehydration from covid  -     Basic Metabolic Panel    History of 2019 novel coronavirus disease (COVID-19), Anorexia   Overall improving though wt loss is concerning. Pt is encouraged to try Ensure or Boost.   Discussed that mRNA technology was developed over 10 yr ago and that the concept is not new as many people think. Though I respect individual's decision to receive the vaccine, I strongly encourage it. Memory loss   [facility] for cognitive care. Dr Amrit Santiago appt soon.     Nonrheumatic mitral valve regurgitation, Aortic valve sclerosis   Murmur is stable, rhythm remains normal.

## 2021-02-18 LAB
ANION GAP SERPL CALCULATED.3IONS-SCNC: 11 MMOL/L (ref 3–16)
BUN BLDV-MCNC: 11 MG/DL (ref 7–20)
CALCIUM SERPL-MCNC: 10 MG/DL (ref 8.3–10.6)
CHLORIDE BLD-SCNC: 101 MMOL/L (ref 99–110)
CO2: 28 MMOL/L (ref 21–32)
CREAT SERPL-MCNC: 0.9 MG/DL (ref 0.6–1.2)
GFR AFRICAN AMERICAN: >60
GFR NON-AFRICAN AMERICAN: >60
GLUCOSE BLD-MCNC: 101 MG/DL (ref 70–99)
POTASSIUM SERPL-SCNC: 4.3 MMOL/L (ref 3.5–5.1)
SODIUM BLD-SCNC: 140 MMOL/L (ref 136–145)

## 2021-03-02 ENCOUNTER — VIRTUAL VISIT (OUTPATIENT)
Dept: FAMILY MEDICINE CLINIC | Age: 81
End: 2021-03-02
Payer: MEDICARE

## 2021-03-02 DIAGNOSIS — I95.9 HYPOTENSION, UNSPECIFIED HYPOTENSION TYPE: ICD-10-CM

## 2021-03-02 DIAGNOSIS — R63.0 DECREASED APPETITE: ICD-10-CM

## 2021-03-02 DIAGNOSIS — H02.841 SWELLING OF RIGHT UPPER EYELID: Primary | ICD-10-CM

## 2021-03-02 PROCEDURE — 99213 OFFICE O/P EST LOW 20 MIN: CPT | Performed by: NURSE PRACTITIONER

## 2021-03-02 RX ORDER — ERYTHROMYCIN 5 MG/G
OINTMENT OPHTHALMIC
Qty: 3.5 G | Refills: 0 | Status: SHIPPED
Start: 2021-03-02 | End: 2022-01-26

## 2021-03-02 ASSESSMENT — ENCOUNTER SYMPTOMS
SHORTNESS OF BREATH: 0
EYE DISCHARGE: 0
WHEEZING: 0
EYE ITCHING: 0
PHOTOPHOBIA: 0
EYE PAIN: 0

## 2021-03-02 NOTE — PROGRESS NOTES
3/2/2021    TELEHEALTH EVALUATION -- Audio/Visual (During QDNCN-45 public health emergency)    HPI:    Tod Boykin (:  1940) has requested an audio/video evaluation for the following concern(s):    Daughter present for appt     Has been losing weight still- has been taking 1 ensure a day, doesn't have much appetite- has taste and smell but does not feel like eating     Had some spots on her back that she was scratching and then maybe touched her eye in the night-  right eye was almost swollen shut when she woke up this morning. Not sensitive to light, no eye discharge, a little tender to touch \"in the corner of the eye\". 106/52 HR 53  97% 02    Review of Systems   Constitutional: Negative for chills and fever. HENT: Negative for congestion. Eyes: Negative for photophobia, pain, discharge, itching and visual disturbance. Eye redness: right eye lid- swollen tried warm compress. Respiratory: Negative for shortness of breath and wheezing. Cardiovascular: Negative for chest pain and palpitations. Neurological: Negative for dizziness (\"maybe a little\") and headaches. Prior to Visit Medications    Medication Sig Taking?  Authorizing Provider   erythromycin (ROMYCIN) 5 MG/GM ophthalmic ointment Apply one ribbon in eye(s), 4 times a day for 10 days Yes NICKY Mercer - CNP   DULoxetine (CYMBALTA) 20 MG extended release capsule Take 20 mg by mouth 2 times daily Yes Historical Provider, MD   Cholecalciferol (VITAMIN D3) 50 MCG (2000) CAPS Take by mouth Yes Historical Provider, MD   donepezil (ARICEPT) 10 MG tablet Take 1 tablet by mouth nightly Yes Srinath Durham MD   apixaban (ELIQUIS) 5 MG TABS tablet Take 1 tablet by mouth 2 times daily Yes Srinath Durham MD   metoprolol tartrate (LOPRESSOR) 25 MG tablet Take 1 tablet by mouth 2 times daily Yes Srinath Durham MD   atorvastatin (LIPITOR) 10 MG tablet Take 1 tablet by mouth daily Yes Srinath Durham MD dilTIAZem (TIAZAC) 180 MG extended release capsule TAKE ONE CAPSULE BY MOUTH DAILY Yes Yoli Linares MD   losartan (COZAAR) 100 MG tablet Take 1 tablet by mouth daily Yes Yoli Linares MD       Social History     Tobacco Use    Smoking status: Never Smoker    Smokeless tobacco: Never Used   Substance Use Topics    Alcohol use: No     Alcohol/week: 0.0 - 0.8 standard drinks    Drug use: No        Allergies   Allergen Reactions    Lisinopril      Rash   ,   Past Medical History:   Diagnosis Date    Atrial flutter (HCC)     s/p ablation    Atrial tachycardia (HCC)     s/p ablation    CTS (carpal tunnel syndrome)     left    HLD (hyperlipidemia)     Hypertension     Mitral regurgitation 2016    moderate, also with aortic sclerosis per echo    Mixed hyperlipidemia 2018    Osteopenia 2013    Shingles 2012    Declines vaccine   ,   Past Surgical History:   Procedure Laterality Date    ABLATION OF DYSRHYTHMIC FOCUS  16    L-sided atypical A flutter, L-sided AT    CATARACT REMOVAL WITH IMPLANT Left 2018    COLONOSCOPY      EYE SURGERY Right 2018    phaco with IOL    KNEE SURGERY Left     meniscus tear   ,   Family History   Problem Relation Age of Onset    Other Mother         osteoporosis    Cancer Mother         ovarian, s/p oophorectomy/leukemia,  80    High Cholesterol Brother     Cancer Father         leukemia,  80    Stroke Brother        PHYSICAL EXAMINATION:  [ INSTRUCTIONS:  \"[x]\" Indicates a positive item  \"[]\" Indicates a negative item  -- DELETE ALL ITEMS NOT EXAMINED]  Vital Signs: (As obtained by patient/caregiver or practitioner observation)    Blood pressure- 106/52 Heart rate-  52  Respiratory rate-    Temperature-  Pulse oximetry- 97%    Constitutional: [x] Appears well-developed and well-nourished [x] No apparent distress      [] Abnormal-   Mental status  [x] Alert and awake  [] Oriented to person/place/time [x]Able to follow commands Eyes:  EOM    []  Normal  [] Abnormal-  Sclera  [x]  Normal  [x] Abnormal - top of right eyelid red and swollen         Discharge [x]  None visible  [] Abnormal -    HENT:   [x] Normocephalic, atraumatic. [] Abnormal   [] Mouth/Throat: Mucous membranes are moist.     External Ears [] Normal  [] Abnormal-     Neck: [] No visualized mass     Pulmonary/Chest: [x] Respiratory effort normal.  [x] No visualized signs of difficulty breathing or respiratory distress        [] Abnormal-      Musculoskeletal:   [] Normal gait with no signs of ataxia         [] Normal range of motion of neck        [] Abnormal-       Neurological:        [x] No Facial Asymmetry (Cranial nerve 7 motor function) (limited exam to video visit)          [x] No gaze palsy        [] Abnormal-         Skin:        [x] No significant exanthematous lesions or discoloration noted on facial skin         [] Abnormal-            Psychiatric:       [x] Normal Affect [x] No Hallucinations        [] Abnormal-  Repeats self    Other pertinent observable physical exam findings-     ASSESSMENT/PLAN:  1. Swelling of right upper eyelid  Warm compress a few times a day discussed  - erythromycin (ROMYCIN) 5 MG/GM ophthalmic ointment; Apply one ribbon in eye(s), 4 times a day for 10 days  Dispense: 3.5 g; Refill: 0    2. Decreased appetite  Advised 2 ensure drinks a day if she is not eating much    3. Hypotension, unspecified hypotension type  Discussed checking her BP and pulse before 2nd dose of metoprolol since HR  And BP running low- will discuss with Dr. Vandana March        No follow-ups on file.

## 2021-04-15 ENCOUNTER — OFFICE VISIT (OUTPATIENT)
Dept: CARDIOLOGY CLINIC | Age: 81
End: 2021-04-15
Payer: MEDICARE

## 2021-04-15 ENCOUNTER — TELEPHONE (OUTPATIENT)
Dept: CARDIOLOGY CLINIC | Age: 81
End: 2021-04-15

## 2021-04-15 VITALS
DIASTOLIC BLOOD PRESSURE: 60 MMHG | WEIGHT: 139 LBS | HEIGHT: 66 IN | OXYGEN SATURATION: 96 % | HEART RATE: 63 BPM | BODY MASS INDEX: 22.34 KG/M2 | SYSTOLIC BLOOD PRESSURE: 120 MMHG

## 2021-04-15 DIAGNOSIS — I48.4 ATYPICAL ATRIAL FLUTTER (HCC): Primary | ICD-10-CM

## 2021-04-15 PROCEDURE — 99214 OFFICE O/P EST MOD 30 MIN: CPT | Performed by: INTERNAL MEDICINE

## 2021-04-15 PROCEDURE — 93000 ELECTROCARDIOGRAM COMPLETE: CPT | Performed by: INTERNAL MEDICINE

## 2021-04-15 RX ORDER — METOPROLOL SUCCINATE 25 MG/1
25 TABLET, EXTENDED RELEASE ORAL 2 TIMES DAILY
Qty: 180 TABLET | Refills: 3 | Status: SHIPPED | OUTPATIENT
Start: 2021-04-15 | End: 2022-04-21

## 2021-04-15 NOTE — LETTER
[facility]   Electrophysiology Consult Note    Date: 4/15/21  Patient Name: Roselyn Perez  YOB: 1940    Primary Care Physician: Brianna Mendoza MD    CHIEF COMPLAINT:   Chief Complaint   Patient presents with    6 Month Follow-Up     No current cardiac symptoms or concerns to report at this time    Other     Pt reports having COVID+  in January    Atrial Flutter     HISTORY OF PRESENT ILLNESS: Roselyn Perez is a [de-identified] y.o. female with a history of atrial flutter, AT, HTN, mitral regurgitation, and mild mitral stenosis. She was admitted in 1/2016 with atrial flutter with RVR but was asymptomatic with the arrhythmia. ADAM in 1/2016 showed moderate to severe MR. In 1/2016 she had RFCA of a left sided atypical atrial flutter and left sided AT, originating from right superior pulmonary vein with Dr. Jasmine Armendariz. She returned in 1/2016 and her EKG showed atrial flutter. Echo in 1/2016 showed an EF of 55%. Repeat echo in 11/2016 showed an EF of 55% and moderate MR. She has been in sinus rhythm at subsequent visits. She has recent stress due to family moving in with her. Has some memory loss and some fatigue. Was recently started on losartan and reports BP is fluctuating at home. Today, 4/15/2021, patient's ECG demonstrates SR w/ first degree AV block. . Patient reports she is doing well. She reports she was COVID positive in January 2020 requiring hospitalization. She reports she is not 100% but is feeling much better. She reports she just got her first COVID vaccine yesterday and tolerated that well. She reports she tries to stay active by walking in her neighborhood and tolerates that activity well. She reports she is taking all medications as prescribed and tolerates them well. She denies any bleeding or bruising issues. Patient denies current edema, chest pain, sob, palpitations, dizziness or syncope.      Past Medical History:   has a past medical history of Atrial flutter ([address] Atrial tachycardia ([address] CTS (carpal tunnel syndrome), HLD (hyperlipidemia), Hypertension, Mitral regurgitation, Mixed hyperlipidemia, Osteopenia, and Shingles. Past Surgical History:   has a past surgical history that includes knee surgery (Left, 2010); Colonoscopy; ablation of dysrhythmic focus (1/13/16); Cataract removal with implant (Left, 05/01/2018); and eye surgery (Right, 06/05/2018). Allergies:  Lisinopril    Social History:   reports that she has never smoked. She has never used smokeless tobacco. She reports that she does not drink alcohol or use drugs. Family History: family history includes Cancer in her father and mother; High Cholesterol in her brother; Other in her mother; Stroke in her brother. Home Medications:    Prior to Admission medications    Medication Sig Start Date End Date Taking?  Authorizing Provider   erythromycin [facility]) 5 MG/GM ophthalmic ointment Apply one ribbon in eye(s), 4 times a day for 10 days 3/2/21  Yes NICKY Jose CNP   DULoxetine (CYMBALTA) 20 MG extended release capsule Take 20 mg by mouth 2 times daily 11/30/20  Yes Historical Provider, MD   Cholecalciferol (VITAMIN D3) 50 MCG (2000 UT) CAPS Take by mouth   Yes Historical Provider, MD   donepezil (ARICEPT) 10 MG tablet Take 1 tablet by mouth nightly 1/11/21  Yes Linda Davies MD   apixaban (ELIQUIS) 5 MG TABS tablet Take 1 tablet by mouth 2 times daily 11/11/20  Yes Linda Davies MD   metoprolol tartrate (LOPRESSOR) 25 MG tablet Take 1 tablet by mouth 2 times daily 11/11/20  Yes Linda Davies MD   atorvastatin (LIPITOR) 10 MG tablet Take 1 tablet by mouth daily 11/11/20  Yes Linda Davies MD   dilTIAZem (TIAZAC) 180 MG extended release capsule TAKE ONE CAPSULE BY MOUTH DAILY 11/11/20  Yes Linda Davies MD   losartan (COZAAR) 100 MG tablet Take 1 tablet by mouth daily 11/11/20  Yes Linda Davies MD       REVIEW OF SYSTEMS:    All 14-point review of systems are completed and pertinent positives are mentioned in the history of present illness. Other  systems are reviewed and are negative. Physical Examination:    /60   Pulse 63   Ht 5' 6\" (1.676 m)   Wt 139 lb (63 kg)   SpO2 96%   BMI 22.44 kg/m²      Constitutional and General Appearance:    alert, cooperative, no distress and appears stated age  [de-identified]:    PERRLA, no cervical lymphadenopathy. No masses palpable. Normal oral  mucosa  Respiratory:  · Normal excursion and expansion without use of accessory muscles  · Resp Auscultation: Normal breath sounds without dullness or wheezing  Cardiovascular:  · The apical impulse is not displaced  · RRR S1S2 w/o M/G/R  Abdomen:  · No masses or tenderness  · Bowel sounds present  Extremities:  ·  No Cyanosis or Clubbing  ·  Lower extremity edema: No   · Skin: Warm and dry  Neurological:  · Alert and oriented. · Moves all extremities well  · No abnormalities of mood, affect, memory, mentation, or behavior are noted    DATA:      ECHO 11/29/2016:    Summary   Normal left ventricle size with mild concentric left ventricular   hypertrophy. Normal systolic function with an estimated ejection fraction of 55%. No regional wall motion abnormalities are seen. Elevated left ventricular diastolic filling pressure ( E/e' 14.5 ). The left atrium is severely dilated. Moderate posterior mitral annular calcification is present. Moderate mitral regurgitation. Aortic valve appears sclerotic but opens adequately. IMPRESSION:    1. Atrial fibrillation/flutter/tachycardia ([id number] score 4)  04/15/2021  Patient is a pleasant 80-year-old female with a medical history significant for atypical left-sided flutter and left-sided atrial tachycardia, hypertension, hyperlipidemia, mild mitral valve stenosis and dementia who presents from home for follow-up. According patient she is been doing well. She has been tolerating all her medications.   She recently lost approximately 15 pounds after COVID-19 infection. She is located back on her weight. Her weight today is approximate 63 kg. Her renal function is normal.  Due to the fact that she is gaining weight I will hold off on decreasing her apixaban for now. As she is asymptomatic I think be reasonable to continue current course with anticoagulation and rate control. I will transition her to metoprolol succinate from Toprol tartrate given increased half-life. I will asked that she follow-up with EP NP in 1 year or as needed. -  - Switch from metoprolol tartrate to metoprolol succinate.  - Continue apixaban with low threshold to decrease to 2.5 mg BID.  - Continue diltiazem 180 mg daily. Low threshold to come off and increase metoprolol.  - Follow up with EP NP in 1 year unless/until procedure/discussion required or PRN. 2. Hypertension. Blood pressure well controlled. - Continue current course. RECOMMENDATIONS:  1. Stop lopressor. We are changing this medication. 2. Start Toprol 25mg BID. 3. Encourage activity as tolerated. 4. Follow up with EP NP in 1 year or sooner if problems arise. QUALITY MEASURES  1. Tobacco Cessation Counseling: NA  2. Retake of BP if >140/90:   NA  3. Documentation to PCP/referring for new patient:  Sent to PCP at close of office visit  4. CAD patient on anti-platelet: NA  5. CAD patient on STATIN therapy:  NA  6. Patient with CHF and aFib on anticoagulation:  Yes, Eliquis. All questions and concerns were addressed to the patient/family. Alternatives to my treatment were discussed. Dr. Jamie River MD  [address] [facility]. [address] [facility]. [address] [place] [address]  Phone: [phone]  Fax: [phone]     NOTE: This report was transcribed using voice recognition software. Every effort was made to ensure accuracy, however, inadvertent computerized transcription errors may be present.      This note has been scribed in the presence of Deanna Alexandre MD by Ca Carbone EDDIE Tomas. The scribe's documentation has been prepared under my direction and personally reviewed by me in its entirety. I confirm that the note above accurately reflects all work, physical examination, the discussion of treatments and procedures, and medical decision making performed by me. Soila Dawson MD personally performed the services described in this documentation as scribed by nurse in my presence, and is both accurate and complete.     Electronically signed by Alyssa Garcia MD on 4/15/2021 at 1:47 PM

## 2021-04-15 NOTE — PATIENT INSTRUCTIONS
RECOMMENDATIONS:  1. Stop lopressor. We are changing this medication. 2. Start Toprol 25mg BID. 3. Encourage activity as tolerated. 4. Follow up with EP NP in 1 year or sooner if problems arise.

## 2021-04-15 NOTE — PROGRESS NOTES
positives are mentioned in the history of present illness. Other  systems are reviewed and are negative. Physical Examination:    /60   Pulse 63   Ht 5' 6\" (1.676 m)   Wt 139 lb (63 kg)   SpO2 96%   BMI 22.44 kg/m²      Constitutional and General Appearance:    alert, cooperative, no distress and appears stated age  [de-identified]:    PERRLA, no cervical lymphadenopathy. No masses palpable. Normal oral  mucosa  Respiratory:  · Normal excursion and expansion without use of accessory muscles  · Resp Auscultation: Normal breath sounds without dullness or wheezing  Cardiovascular:  · The apical impulse is not displaced  · RRR S1S2 w/o M/G/R  Abdomen:  · No masses or tenderness  · Bowel sounds present  Extremities:  ·  No Cyanosis or Clubbing  ·  Lower extremity edema: No   · Skin: Warm and dry  Neurological:  · Alert and oriented. · Moves all extremities well  · No abnormalities of mood, affect, memory, mentation, or behavior are noted    DATA:      ECHO 11/29/2016:    Summary   Normal left ventricle size with mild concentric left ventricular   hypertrophy. Normal systolic function with an estimated ejection fraction of 55%. No regional wall motion abnormalities are seen. Elevated left ventricular diastolic filling pressure ( E/e' 14.5 ). The left atrium is severely dilated. Moderate posterior mitral annular calcification is present. Moderate mitral regurgitation. Aortic valve appears sclerotic but opens adequately. IMPRESSION:    1. Atrial fibrillation/flutter/tachycardia ([id number] score 4)  04/15/2021  Patient is a pleasant 40-year-old female with a medical history significant for atypical left-sided flutter and left-sided atrial tachycardia, hypertension, hyperlipidemia, mild mitral valve stenosis and dementia who presents from home for follow-up. According patient she is been doing well. She has been tolerating all her medications.   She recently lost approximately 15 pounds after EDDIE Tomas. The scribe's documentation has been prepared under my direction and personally reviewed by me in its entirety. I confirm that the note above accurately reflects all work, physical examination, the discussion of treatments and procedures, and medical decision making performed by me. Milo Candelario MD personally performed the services described in this documentation as scribed by nurse in my presence, and is both accurate and complete.     Electronically signed by Tony Kelley MD on 4/15/2021 at 1:47 PM

## 2021-04-16 ENCOUNTER — TELEPHONE (OUTPATIENT)
Dept: CARDIOLOGY CLINIC | Age: 81
End: 2021-04-16

## 2021-04-16 NOTE — TELEPHONE ENCOUNTER
Pt was told by pharmacy that they were concerned about to dosing of metoprolol succinate (TOPROL XL) 25 MG extended release tablet. Should pt be taking 2 of extended release, this will lower the B/P but also the heart rate. Please advise.

## 2021-04-19 NOTE — TELEPHONE ENCOUNTER
Left detailed message for daughter that Toprol XL 25 mg is to be taken twice daily. [address] [facility] called as well informing them that this dosage is correct.

## 2021-05-10 ENCOUNTER — TELEPHONE (OUTPATIENT)
Dept: FAMILY MEDICINE CLINIC | Age: 81
End: 2021-05-10

## 2021-05-10 NOTE — TELEPHONE ENCOUNTER
----- Message from Alec Castaneda sent at 5/10/2021 11:50 AM EDT -----  Subject: Message to Provider    QUESTIONS  Information for Provider? Pt is needing cymbalta refill prescribed   (Through [facility]). Pt has also been picking skin. Neuro psych eval   should have been sent to office  ---------------------------------------------------------------------------  --------------  [address]  What is the best way for the office to contact you? OK to leave message on   voicemail  Preferred Call Back Phone Number? [phone]  ---------------------------------------------------------------------------  --------------  SCRIPT ANSWERS  Relationship to Patient? Other  Representative Name? Madison Verduzco  Is the Representative on the appropriate HIPAA document in Epic?  Yes

## 2021-05-27 ENCOUNTER — OFFICE VISIT (OUTPATIENT)
Dept: FAMILY MEDICINE CLINIC | Age: 81
End: 2021-05-27
Payer: MEDICARE

## 2021-05-27 VITALS
OXYGEN SATURATION: 94 % | HEART RATE: 55 BPM | BODY MASS INDEX: 24.21 KG/M2 | WEIGHT: 150 LBS | DIASTOLIC BLOOD PRESSURE: 70 MMHG | SYSTOLIC BLOOD PRESSURE: 110 MMHG | TEMPERATURE: 98.5 F

## 2021-05-27 DIAGNOSIS — G31.84 MILD NEUROCOGNITIVE DISORDER: ICD-10-CM

## 2021-05-27 DIAGNOSIS — I48.3 TYPICAL ATRIAL FLUTTER (HCC): ICD-10-CM

## 2021-05-27 DIAGNOSIS — F41.9 ANXIETY: Primary | ICD-10-CM

## 2021-05-27 DIAGNOSIS — Z86.16 HISTORY OF 2019 NOVEL CORONAVIRUS DISEASE (COVID-19): ICD-10-CM

## 2021-05-27 PROCEDURE — 99214 OFFICE O/P EST MOD 30 MIN: CPT | Performed by: FAMILY MEDICINE

## 2021-05-27 RX ORDER — DULOXETIN HYDROCHLORIDE 60 MG/1
60 CAPSULE, DELAYED RELEASE ORAL DAILY
Qty: 30 CAPSULE | Refills: 5 | Status: SHIPPED | OUTPATIENT
Start: 2021-05-27 | End: 2021-11-12 | Stop reason: SDUPTHER

## 2021-05-27 NOTE — PROGRESS NOTES
DULoxetine (CYMBALTA) 60 MG extended release capsule Take 1 capsule by mouth daily 30 capsule 5    metoprolol succinate (TOPROL XL) 25 MG extended release tablet Take 1 tablet by mouth 2 times daily 180 tablet 3    erythromycin (ROMYCIN) 5 MG/GM ophthalmic ointment Apply one ribbon in eye(s), 4 times a day for 10 days 3.5 g 0    Cholecalciferol (VITAMIN D3) 50 MCG (2000 UT) CAPS Take by mouth      donepezil (ARICEPT) 10 MG tablet Take 1 tablet by mouth nightly 90 tablet 3    apixaban (ELIQUIS) 5 MG TABS tablet Take 1 tablet by mouth 2 times daily 180 tablet 3    atorvastatin (LIPITOR) 10 MG tablet Take 1 tablet by mouth daily 90 tablet 3    dilTIAZem (TIAZAC) 180 MG extended release capsule TAKE ONE CAPSULE BY MOUTH DAILY 90 capsule 3    losartan (COZAAR) 100 MG tablet Take 1 tablet by mouth daily 90 tablet 3     No current facility-administered medications for this visit. Patient's past medical history,surgical history, family history, medications,  and allergies  were all reviewed and updated as appropriate today.     Review of Systems      Physical Exam      /70   Pulse 55   Temp 98.5 °F (36.9 °C) (Temporal)   Wt 150 lb (68 kg)   SpO2 94%   BMI 24.21 kg/m²

## 2021-07-21 ENCOUNTER — OFFICE VISIT (OUTPATIENT)
Dept: FAMILY MEDICINE CLINIC | Age: 81
End: 2021-07-21
Payer: MEDICARE

## 2021-07-21 VITALS
DIASTOLIC BLOOD PRESSURE: 74 MMHG | SYSTOLIC BLOOD PRESSURE: 124 MMHG | OXYGEN SATURATION: 97 % | BODY MASS INDEX: 24.27 KG/M2 | HEIGHT: 66 IN | HEART RATE: 51 BPM | WEIGHT: 151 LBS | TEMPERATURE: 97 F

## 2021-07-21 DIAGNOSIS — E78.2 MIXED HYPERLIPIDEMIA: ICD-10-CM

## 2021-07-21 DIAGNOSIS — L98.9 SKIN LESION OF CHEST WALL: Primary | ICD-10-CM

## 2021-07-21 DIAGNOSIS — G31.84 MILD NEUROCOGNITIVE DISORDER: ICD-10-CM

## 2021-07-21 PROCEDURE — 99213 OFFICE O/P EST LOW 20 MIN: CPT | Performed by: FAMILY MEDICINE

## 2021-07-21 NOTE — PROGRESS NOTES
Assessment/Plan:    Tracy Rinne was seen today for follow-up and rash. Diagnoses and all orders for this visit:    Skin lesion of chest wall, R side   Appears benign but is red, irritated with s/o scant bleeding at base. Plan for removal at future visit. Pt will not stop her AC prior. Mild neurocognitive disorder   Appears stable. Mixed hyperlipidemia   R/B of statins discussed. Pt tolerates and therefore will cont for risk reduction of stroke or CV event. There are no Patient Instructions on file for this visit. Patient: Lisa Arevalo is a 80 y. o.female who presents today with the following Chief Complaint(s):  Chief Complaint   Patient presents with    Follow-up     Concern with Statin and longterm     Rash     Growth that is on her right side an back          HPI: Dtr Lorna Wills joins pt today. (Dtr Maryanne Brunner lives with pt). Pt has mild neurocognitive d/o per 4/2/21 neuropsych testing. Dr Jey Aj, psychologist, eval'd pt, did neuropsych testing 4/2/21. Pt was dx'd with mild neurocognitive d/o. He will see pt annually for monitoring of cognition. Pt had seen Dr Hever Verde NP x 1 at [facility]. Cymbalta 20 bid was started for anxiety. Cymbalta was increased from 20 bid to 60 mg qd 6 wk ago. Mood is good. Anxiety has decreased, pt and dtr agree. Visited [facility] in [place] 2 wk ago, had wonderful trip. Energy was good, navigated airport easily per dtr. Has multiple skin lesions, sees derm  [facility] who told pt no reason to remove mulitple benign lesions. Pt feels R torso skin lesion is bigger, more bothersome. Pt picks at it, it catches on clothing. Dtr places tape over it which further irritates pt.      Dtr asks about R/B of lipitor 10 mg.     Current Outpatient Medications   Medication Sig Dispense Refill    DULoxetine (CYMBALTA) 60 MG extended release capsule Take 1 capsule by mouth daily 30 capsule 5    metoprolol succinate (TOPROL XL) 25 MG extended release tablet Take 1 tablet by mouth 2 times daily 180 tablet 3    erythromycin (ROMYCIN) 5 MG/GM ophthalmic ointment Apply one ribbon in eye(s), 4 times a day for 10 days 3.5 g 0    Cholecalciferol (VITAMIN D3) 50 MCG (2000 UT) CAPS Take by mouth      donepezil (ARICEPT) 10 MG tablet Take 1 tablet by mouth nightly 90 tablet 3    apixaban (ELIQUIS) 5 MG TABS tablet Take 1 tablet by mouth 2 times daily 180 tablet 3    atorvastatin (LIPITOR) 10 MG tablet Take 1 tablet by mouth daily 90 tablet 3    dilTIAZem (TIAZAC) 180 MG extended release capsule TAKE ONE CAPSULE BY MOUTH DAILY 90 capsule 3    losartan (COZAAR) 100 MG tablet Take 1 tablet by mouth daily 90 tablet 3     No current facility-administered medications for this visit. Patient's past medical history,surgical history, family history, medications,  and allergies  were all reviewed and updated as appropriate today. Review of Systems  abv    Physical Exam  Constitutional:       Appearance: Normal appearance. She is well-developed. HENT:      Head: Normocephalic and atraumatic. Right Ear: External ear normal.      Left Ear: External ear normal.   Eyes:      General: No scleral icterus. Right eye: No discharge. Left eye: No discharge. Extraocular Movements: Extraocular movements intact. Conjunctiva/sclera: Conjunctivae normal.   Neck:      Comments: No visualized masses  FROM  Pulmonary:      Effort: Pulmonary effort is normal.   Musculoskeletal:         General: Normal range of motion. Skin:     General: Skin is warm and dry. Neurological:      General: No focal deficit present. Mental Status: She is alert and oriented to person, place, and time. Psychiatric:         Mood and Affect: Mood normal.         Behavior: Behavior normal.         Thought Content:  Thought content normal.         Judgment: Judgment normal.           /74   Pulse 51   Temp 97 °F (36.1 °C) (Temporal)   Ht 5' 6\" (1.676 m)   Wt 151 lb (68.5 kg) SpO2 97%   BMI 24.37 kg/m²

## 2021-07-21 NOTE — LETTER
[address]  Phone: [phone]  Fax: [phone]    Iman Judge MD         July 21, 2021     Patient: Jovanny Wagner   YOB: 1940   Date of Visit: 7/21/2021       To Whom It May Concern: It is my medical opinion that Douglas Sky requires a disability parking placard for the following reasons:  She cannot walk 200 feet without stopping to rest.  Duration of need: 5 years    If you have any questions or concerns, please don't hesitate to call.     Sincerely,        Iman Judge MD

## 2021-07-28 ENCOUNTER — HOSPITAL ENCOUNTER (OUTPATIENT)
Dept: WOMENS IMAGING | Age: 81
Discharge: HOME | End: 2021-07-28
Payer: MEDICARE

## 2021-07-28 VITALS — WEIGHT: 150 LBS | HEIGHT: 66 IN | BODY MASS INDEX: 24.11 KG/M2

## 2021-07-28 DIAGNOSIS — Z12.31 VISIT FOR SCREENING MAMMOGRAM: ICD-10-CM

## 2021-07-28 PROCEDURE — 77067 SCR MAMMO BI INCL CAD: CPT

## 2021-08-11 ENCOUNTER — PROCEDURE VISIT (OUTPATIENT)
Dept: FAMILY MEDICINE CLINIC | Age: 81
End: 2021-08-11
Payer: MEDICARE

## 2021-08-11 VITALS
SYSTOLIC BLOOD PRESSURE: 130 MMHG | WEIGHT: 156 LBS | HEIGHT: 66 IN | TEMPERATURE: 97.5 F | OXYGEN SATURATION: 95 % | DIASTOLIC BLOOD PRESSURE: 70 MMHG | HEART RATE: 62 BPM | BODY MASS INDEX: 25.07 KG/M2

## 2021-08-11 DIAGNOSIS — L98.9 SKIN LESION, SUPERFICIAL: ICD-10-CM

## 2021-08-11 DIAGNOSIS — L82.1 SK (SEBORRHEIC KERATOSIS): ICD-10-CM

## 2021-08-11 DIAGNOSIS — L98.9 SKIN LESION OF CHEST WALL: ICD-10-CM

## 2021-08-11 DIAGNOSIS — L81.9 PIGMENTED SKIN LESION OF UNCERTAIN BEHAVIOR OF TORSO: Primary | ICD-10-CM

## 2021-08-11 PROCEDURE — 17110 DESTRUCTION B9 LES UP TO 14: CPT | Performed by: FAMILY MEDICINE

## 2021-08-11 PROCEDURE — 11301 SHAVE SKIN LESION 0.6-1.0 CM: CPT | Performed by: FAMILY MEDICINE

## 2021-08-11 NOTE — PROGRESS NOTES
tablet 3     No current facility-administered medications for this visit. Patient's past medical history,surgical history, family history, medications,  and allergies  were all reviewed and updated as appropriate today. Review of Systems  abv    Physical Exam  Constitutional:       Appearance: Normal appearance. She is well-developed. HENT:      Head: Normocephalic and atraumatic. Right Ear: External ear normal.      Left Ear: External ear normal.   Eyes:      General: No scleral icterus. Right eye: No discharge. Left eye: No discharge. Extraocular Movements: Extraocular movements intact. Conjunctiva/sclera: Conjunctivae normal.   Neck:      Comments: No visualized masses  FROM  Pulmonary:      Effort: Pulmonary effort is normal.   Musculoskeletal:         General: Normal range of motion. Skin:     General: Skin is warm and dry. Findings: Lesion (SK x 4 as per HPI, R lat sup flank/lower chest wall sessile rounded 7 mm lesionf) present. Neurological:      General: No focal deficit present. Mental Status: She is alert and oriented to person, place, and time. Psychiatric:         Mood and Affect: Mood normal.         Behavior: Behavior normal.         Thought Content:  Thought content normal.         Judgment: Judgment normal.           /70   Pulse 62   Temp 97.5 °F (36.4 °C) (Temporal)   Ht 5' 6\" (1.676 m)   Wt 156 lb (70.8 kg)   SpO2 95%   BMI 25.18 kg/m²

## 2021-08-13 ENCOUNTER — TELEPHONE (OUTPATIENT)
Dept: FAMILY MEDICINE CLINIC | Age: 81
End: 2021-08-13

## 2021-08-13 NOTE — TELEPHONE ENCOUNTER
Ghulam Smith from [facility] lab said there was no source on the bottle for this patient.     Please call her at [phone]

## 2021-09-07 ENCOUNTER — TELEPHONE (OUTPATIENT)
Dept: FAMILY MEDICINE CLINIC | Age: 81
End: 2021-09-07

## 2021-09-07 NOTE — TELEPHONE ENCOUNTER
Pt took her morning and evening doses of apixaban. donepezil and metoprolol succinate at the same time. Daughter Niles Henley wants to know if she should be concerned. Please contact Niles Kale [phone] and advise.

## 2021-09-07 NOTE — TELEPHONE ENCOUNTER
Can decrease the heart rate. If you have a pulse oximeter you can monitor this.  If below 55 beats per minute or change is level of consciousness go to ER

## 2021-11-12 ENCOUNTER — TELEPHONE (OUTPATIENT)
Dept: FAMILY MEDICINE CLINIC | Age: 81
End: 2021-11-12

## 2021-11-12 DIAGNOSIS — F41.9 ANXIETY: ICD-10-CM

## 2021-11-12 DIAGNOSIS — I10 HYPERTENSION, UNSPECIFIED TYPE: ICD-10-CM

## 2021-11-12 DIAGNOSIS — I48.4 ATYPICAL ATRIAL FLUTTER (HCC): ICD-10-CM

## 2021-11-12 RX ORDER — DULOXETIN HYDROCHLORIDE 60 MG/1
60 CAPSULE, DELAYED RELEASE ORAL DAILY
Qty: 30 CAPSULE | Refills: 5 | Status: SHIPPED | OUTPATIENT
Start: 2021-11-12 | End: 2022-01-03 | Stop reason: SDUPTHER

## 2021-11-12 RX ORDER — LOSARTAN POTASSIUM 100 MG/1
100 TABLET ORAL DAILY
Qty: 90 TABLET | Refills: 3 | Status: SHIPPED | OUTPATIENT
Start: 2021-11-12 | End: 2021-11-15

## 2021-11-12 NOTE — TELEPHONE ENCOUNTER
Last Office Visit  -  8/11/21  Next Office Visit  -  1/6/22    Last Filled  -  11/11/20,5/27/21  Last UDS -    Contract -

## 2021-11-12 NOTE — TELEPHONE ENCOUNTER
Last Office Visit  - 8-  Next Office Visit  - 1-    Last Filled    Last UDS -    Contract -      Refill apixaban (ELIQUIS) 5 MG TABS tablet # 180    losartan (COZAAR) 100 MG tablet  # 90    DULoxetine (CYMBALTA) 60 MG extended release capsule  # 30     Pharmacy [facility]

## 2022-01-03 DIAGNOSIS — F41.9 ANXIETY: ICD-10-CM

## 2022-01-03 DIAGNOSIS — E78.2 MIXED HYPERLIPIDEMIA: ICD-10-CM

## 2022-01-03 DIAGNOSIS — I48.4 ATYPICAL ATRIAL FLUTTER (HCC): ICD-10-CM

## 2022-01-03 RX ORDER — DILTIAZEM HYDROCHLORIDE 180 MG/1
CAPSULE, EXTENDED RELEASE ORAL
Qty: 90 CAPSULE | Refills: 3 | Status: SHIPPED | OUTPATIENT
Start: 2022-01-03 | End: 2022-01-05

## 2022-01-03 RX ORDER — DULOXETIN HYDROCHLORIDE 60 MG/1
60 CAPSULE, DELAYED RELEASE ORAL DAILY
Qty: 90 CAPSULE | Refills: 3 | Status: SHIPPED | OUTPATIENT
Start: 2022-01-03 | End: 2022-01-26

## 2022-01-03 RX ORDER — ATORVASTATIN CALCIUM 10 MG/1
10 TABLET, FILM COATED ORAL DAILY
Qty: 90 TABLET | Refills: 3 | Status: SHIPPED | OUTPATIENT
Start: 2022-01-03 | End: 2022-01-05

## 2022-01-03 NOTE — TELEPHONE ENCOUNTER
Lov  8/11/21  Future 1/26/22  Refill    dilTIAZem (TIAZAC) 180 MG extended release capsule    atorvastatin (LIPITOR) 10 MG tablet    And can she have the   DULoxetine (CYMBALTA) 60 MG extended release capsule  A 90 day supply?   Pharmacy  [place] [id number]

## 2022-01-05 DIAGNOSIS — I48.4 ATYPICAL ATRIAL FLUTTER (HCC): ICD-10-CM

## 2022-01-05 DIAGNOSIS — E78.2 MIXED HYPERLIPIDEMIA: ICD-10-CM

## 2022-01-05 RX ORDER — DILTIAZEM HYDROCHLORIDE 180 MG/1
CAPSULE, EXTENDED RELEASE ORAL
Qty: 90 CAPSULE | Refills: 3 | Status: SHIPPED | OUTPATIENT
Start: 2022-01-05

## 2022-01-05 RX ORDER — ATORVASTATIN CALCIUM 10 MG/1
TABLET, FILM COATED ORAL
Qty: 90 TABLET | Refills: 3 | Status: SHIPPED | OUTPATIENT
Start: 2022-01-05

## 2022-01-18 RX ORDER — DONEPEZIL HYDROCHLORIDE 10 MG/1
TABLET, FILM COATED ORAL
Qty: 90 TABLET | Refills: 3 | Status: SHIPPED | OUTPATIENT
Start: 2022-01-18

## 2022-01-26 ENCOUNTER — OFFICE VISIT (OUTPATIENT)
Dept: FAMILY MEDICINE CLINIC | Age: 82
End: 2022-01-26
Payer: MEDICARE

## 2022-01-26 VITALS
BODY MASS INDEX: 26.52 KG/M2 | SYSTOLIC BLOOD PRESSURE: 124 MMHG | OXYGEN SATURATION: 98 % | HEIGHT: 66 IN | WEIGHT: 165 LBS | DIASTOLIC BLOOD PRESSURE: 70 MMHG | HEART RATE: 53 BPM

## 2022-01-26 DIAGNOSIS — I10 HYPERTENSION, UNSPECIFIED TYPE: ICD-10-CM

## 2022-01-26 DIAGNOSIS — R07.9 CHEST PAIN, UNSPECIFIED TYPE: ICD-10-CM

## 2022-01-26 DIAGNOSIS — F41.9 ANXIETY: ICD-10-CM

## 2022-01-26 DIAGNOSIS — R73.01 IFG (IMPAIRED FASTING GLUCOSE): ICD-10-CM

## 2022-01-26 DIAGNOSIS — G31.84 MILD NEUROCOGNITIVE DISORDER: ICD-10-CM

## 2022-01-26 DIAGNOSIS — R61 DIAPHORESIS: Primary | ICD-10-CM

## 2022-01-26 DIAGNOSIS — Z86.79 HISTORY OF ATRIAL FLUTTER: ICD-10-CM

## 2022-01-26 LAB
A/G RATIO: 1.6 (ref 1.1–2.2)
ALBUMIN SERPL-MCNC: 4.2 G/DL (ref 3.4–5)
ALP BLD-CCNC: 84 U/L (ref 40–129)
ALT SERPL-CCNC: 25 U/L (ref 10–40)
ANION GAP SERPL CALCULATED.3IONS-SCNC: 13 MMOL/L (ref 3–16)
AST SERPL-CCNC: 21 U/L (ref 15–37)
BASOPHILS ABSOLUTE: 0.1 K/UL (ref 0–0.2)
BASOPHILS RELATIVE PERCENT: 0.8 %
BILIRUB SERPL-MCNC: 0.6 MG/DL (ref 0–1)
BUN BLDV-MCNC: 21 MG/DL (ref 7–20)
CALCIUM SERPL-MCNC: 9.1 MG/DL (ref 8.3–10.6)
CHLORIDE BLD-SCNC: 101 MMOL/L (ref 99–110)
CO2: 27 MMOL/L (ref 21–32)
CREAT SERPL-MCNC: 0.9 MG/DL (ref 0.6–1.2)
EOSINOPHILS ABSOLUTE: 0.2 K/UL (ref 0–0.6)
EOSINOPHILS RELATIVE PERCENT: 2.5 %
GFR AFRICAN AMERICAN: >60
GFR NON-AFRICAN AMERICAN: 60
GLUCOSE BLD-MCNC: 95 MG/DL (ref 70–99)
HCT VFR BLD CALC: 39.5 % (ref 36–48)
HEMOGLOBIN: 12.9 G/DL (ref 12–16)
LYMPHOCYTES ABSOLUTE: 1.6 K/UL (ref 1–5.1)
LYMPHOCYTES RELATIVE PERCENT: 24.2 %
MCH RBC QN AUTO: 30.6 PG (ref 26–34)
MCHC RBC AUTO-ENTMCNC: 32.6 G/DL (ref 31–36)
MCV RBC AUTO: 93.7 FL (ref 80–100)
MONOCYTES ABSOLUTE: 0.6 K/UL (ref 0–1.3)
MONOCYTES RELATIVE PERCENT: 8.5 %
NEUTROPHILS ABSOLUTE: 4.3 K/UL (ref 1.7–7.7)
NEUTROPHILS RELATIVE PERCENT: 64 %
PDW BLD-RTO: 13.3 % (ref 12.4–15.4)
PLATELET # BLD: 296 K/UL (ref 135–450)
PMV BLD AUTO: 8.4 FL (ref 5–10.5)
POTASSIUM SERPL-SCNC: 4.5 MMOL/L (ref 3.5–5.1)
RBC # BLD: 4.21 M/UL (ref 4–5.2)
SODIUM BLD-SCNC: 141 MMOL/L (ref 136–145)
TOTAL PROTEIN: 6.8 G/DL (ref 6.4–8.2)
TSH REFLEX FT4: 1.04 UIU/ML (ref 0.27–4.2)
WBC # BLD: 6.8 K/UL (ref 4–11)

## 2022-01-26 PROCEDURE — 99214 OFFICE O/P EST MOD 30 MIN: CPT | Performed by: FAMILY MEDICINE

## 2022-01-26 PROCEDURE — 36415 COLL VENOUS BLD VENIPUNCTURE: CPT | Performed by: FAMILY MEDICINE

## 2022-01-26 PROCEDURE — 93000 ELECTROCARDIOGRAM COMPLETE: CPT | Performed by: FAMILY MEDICINE

## 2022-01-26 RX ORDER — DULOXETIN HYDROCHLORIDE 30 MG/1
30 CAPSULE, DELAYED RELEASE ORAL DAILY
Qty: 90 CAPSULE | Refills: 3 | Status: SHIPPED | OUTPATIENT
Start: 2022-01-26

## 2022-01-26 ASSESSMENT — PATIENT HEALTH QUESTIONNAIRE - PHQ9
SUM OF ALL RESPONSES TO PHQ QUESTIONS 1-9: 0
1. LITTLE INTEREST OR PLEASURE IN DOING THINGS: 0
SUM OF ALL RESPONSES TO PHQ9 QUESTIONS 1 & 2: 0
SUM OF ALL RESPONSES TO PHQ QUESTIONS 1-9: 0
2. FEELING DOWN, DEPRESSED OR HOPELESS: 0

## 2022-01-26 NOTE — PROGRESS NOTES
Assessment/Plan:    Vikas Dietrich was seen today for 6 month follow-up. Diagnoses and all orders for this visit:    Diaphoresis   Possibly 2/2 cymbalta and thus dose is decreased  -     EKG 12 Lead  -     ECHO Stress Test; Future  -     Hemoglobin A1C  -     Comprehensive Metabolic Panel  -     TSH WITH REFLEX TO FT4    Hypertension, unspecified type  -     Comprehensive Metabolic Panel  -     TSH WITH REFLEX TO FT4  -     CBC Auto Differential    Mild neurocognitive disorder   Appears stable    Anxiety  -     DULoxetine (CYMBALTA) 30 MG extended release capsule; Take 1 capsule by mouth daily. Dose is decreased from 60 mg qd 2/2 sweating    History of atrial flutter  -     EKG 12 Lead  -     ECHO Stress Test; Future    IFG (impaired fasting glucose)  -     Hemoglobin A1C    Chest pain, unspecified type/diaphoresis  -     ECHO Stress Test; Future    Low back pain   Consider PT at next appt once after above issues have been eval'd. There are no Patient Instructions on file for this visit. Call [phone]  With labs    Patient: Lisseth Quesada is a 80 y. o.female who presents today with the following Chief Complaint(s):  Chief Complaint   Patient presents with    6 Month Follow-Up         HPI: Dtr Angel Wilson today. Rishabh Craig lives with pt). Pt has mild neurocognitive d/o per 4/2/21 neuropsych testing. Pt had seen Dr Phong Washington NP x 1 at [facility]. Cymbalta 20 bid was started for anxiety. In 5/2021, dose was increased to 60 mg qd. Dtr states cymbalta has improved anxiety, pt appears content though she conts to struggle with scrathing skin. Has been sweating on/off x mo's. Ene Greer noted that mom was often sweaty when sitting on deck in summer. In cooler temps, random sweating conts. Can awaken 3 am so sweaty she has to change sheets. One mo ago, had sweats after walking 10 steps from car to front door, hair was wet, had to sit down.  This wk, in daytime, was sweating after walking through home. No cp, sob. Dtr is concerned about blockage. Has pain x many yrs in R low back medial to SI joint. Chiro in past not helpful. Current Outpatient Medications   Medication Sig Dispense Refill    DULoxetine (CYMBALTA) 30 MG extended release capsule Take 1 capsule by mouth daily 90 capsule 3    donepezil (ARICEPT) 10 MG tablet TAKE ONE TABLET BY MOUTH ONCE NIGHTLY 90 tablet 3    atorvastatin (LIPITOR) 10 MG tablet TAKE ONE TABLET BY MOUTH DAILY 90 tablet 3    dilTIAZem (TIAZAC) 180 MG extended release capsule TAKE ONE CAPSULE BY MOUTH DAILY 90 capsule 3    ELIQUIS 5 MG TABS tablet TAKE ONE TABLET BY MOUTH TWICE A  tablet 3    losartan (COZAAR) 100 MG tablet TAKE ONE TABLET BY MOUTH DAILY 90 tablet 3    metoprolol succinate (TOPROL XL) 25 MG extended release tablet Take 1 tablet by mouth 2 times daily 180 tablet 3    Cholecalciferol (VITAMIN D3) 50 MCG (2000 UT) CAPS Take by mouth       No current facility-administered medications for this visit. Patient's past medical history,surgical history, family history, medications,  and allergies  were all reviewed and updated as appropriate today. Review of Systems  abv    Physical Exam  Constitutional:       General: She is not in acute distress. Appearance: Normal appearance. She is well-developed. She is not ill-appearing. HENT:      Head: Normocephalic and atraumatic. Eyes:      General: No scleral icterus. Right eye: No discharge. Left eye: No discharge. Extraocular Movements: Extraocular movements intact. Conjunctiva/sclera: Conjunctivae normal.   Neck:      Vascular: No carotid bruit. Comments: Neg TMG  Cardiovascular:      Rate and Rhythm: Normal rate and regular rhythm. Pulses: Normal pulses. Heart sounds: Normal heart sounds. No murmur heard. Pulmonary:      Effort: Pulmonary effort is normal. No respiratory distress. Breath sounds: Normal breath sounds.    Abdominal: General: Bowel sounds are normal. There is no distension. Palpations: Abdomen is soft. There is no mass. Tenderness: There is no abdominal tenderness. Musculoskeletal:         General: Normal range of motion. Cervical back: Normal range of motion and neck supple. Right lower leg: No edema. Left lower leg: No edema. Lymphadenopathy:      Cervical: No cervical adenopathy. Skin:     General: Skin is warm and dry. Capillary Refill: Capillary refill takes less than 2 seconds. Coloration: Skin is not jaundiced or pale. Findings: No rash. Neurological:      Mental Status: She is alert and oriented to person, place, and time. Cranial Nerves: No cranial nerve deficit. Deep Tendon Reflexes: Reflexes are normal and symmetric. Comments: Mild short term memory loss noted   Psychiatric:         Mood and Affect: Mood normal.         Behavior: Behavior normal.         Thought Content:  Thought content normal.         Judgment: Judgment normal.           BP (!) 142/70   Pulse 53   Ht 5' 6\" (1.676 m)   Wt 165 lb (74.8 kg)   SpO2 98%   BMI 26.63 kg/m²

## 2022-01-27 LAB
ESTIMATED AVERAGE GLUCOSE: 119.8 MG/DL
HBA1C MFR BLD: 5.8 %

## 2022-02-16 ENCOUNTER — TELEPHONE (OUTPATIENT)
Dept: FAMILY MEDICINE CLINIC | Age: 82
End: 2022-02-16

## 2022-02-16 ENCOUNTER — HOSPITAL ENCOUNTER (OUTPATIENT)
Dept: NON INVASIVE DIAGNOSTICS | Age: 82
Discharge: HOME | End: 2022-02-16

## 2022-02-16 DIAGNOSIS — R61 DIAPHORESIS: ICD-10-CM

## 2022-02-16 DIAGNOSIS — R07.9 CHEST PAIN, UNSPECIFIED TYPE: ICD-10-CM

## 2022-02-16 DIAGNOSIS — Z86.79 HISTORY OF ATRIAL FLUTTER: ICD-10-CM

## 2022-02-16 DIAGNOSIS — R07.89 ATYPICAL CHEST PAIN: Primary | ICD-10-CM

## 2022-02-16 NOTE — TELEPHONE ENCOUNTER
Pt stating Imaging told her that her PCP needs to Change Order. Change From: ECHO Stress Test  To: [facility] [place]    Call Pt's daughter back when Order is completed & with questions.   [employer] [phone]  Formerly [facility] [phone]

## 2022-02-16 NOTE — TELEPHONE ENCOUNTER
Contacted dtr Romeo Minaya and she was informed the new order is in the system and pt is able to schedule. She informed that they may r/s apt on 2/24/22 if test is not done therefore the results will be available at time of appt.

## 2022-02-21 ENCOUNTER — TELEPHONE (OUTPATIENT)
Dept: FAMILY MEDICINE CLINIC | Age: 82
End: 2022-02-21

## 2022-02-21 NOTE — TELEPHONE ENCOUNTER
Jerilyn is calling from [facility] to know which Echo test to perform on patient? She said there are 2 in her chart.   Please call her at   [phone]

## 2022-02-21 NOTE — TELEPHONE ENCOUNTER
Initially, I had ordered a stress echo but we received a call from cards asking that we change order to a nuclear med [facility]. Pls d/c the stress echo order and let Jerilyn know.

## 2022-02-24 ENCOUNTER — HOSPITAL ENCOUNTER (OUTPATIENT)
Dept: NUCLEAR MEDICINE | Age: 82
Discharge: HOME | End: 2022-02-24
Payer: MEDICARE

## 2022-02-24 ENCOUNTER — HOSPITAL ENCOUNTER (OUTPATIENT)
Dept: NON INVASIVE DIAGNOSTICS | Age: 82
Discharge: HOME | End: 2022-02-24
Payer: MEDICARE

## 2022-02-24 DIAGNOSIS — R61 DIAPHORESIS: ICD-10-CM

## 2022-02-24 DIAGNOSIS — R07.89 ATYPICAL CHEST PAIN: ICD-10-CM

## 2022-02-24 LAB
LV EF: 65 %
LVEF MODALITY: NORMAL

## 2022-02-24 PROCEDURE — 93017 CV STRESS TEST TRACING ONLY: CPT

## 2022-02-24 PROCEDURE — A9502 TC99M TETROFOSMIN: HCPCS | Performed by: FAMILY MEDICINE

## 2022-02-24 PROCEDURE — 3430000000 HC RX DIAGNOSTIC RADIOPHARMACEUTICAL: Performed by: FAMILY MEDICINE

## 2022-02-24 PROCEDURE — 78452 HT MUSCLE IMAGE SPECT MULT: CPT

## 2022-02-24 PROCEDURE — 6360000002 HC RX W HCPCS: Performed by: FAMILY MEDICINE

## 2022-02-24 RX ADMIN — TETROFOSMIN 10.5 MILLICURIE: 1.38 INJECTION, POWDER, LYOPHILIZED, FOR SOLUTION INTRAVENOUS at 13:09

## 2022-02-24 RX ADMIN — REGADENOSON 0.4 MG: 0.08 INJECTION, SOLUTION INTRAVENOUS at 14:20

## 2022-02-24 RX ADMIN — TETROFOSMIN 31.1 MILLICURIE: 1.38 INJECTION, POWDER, LYOPHILIZED, FOR SOLUTION INTRAVENOUS at 14:17

## 2022-02-28 ENCOUNTER — TELEPHONE (OUTPATIENT)
Dept: FAMILY MEDICINE CLINIC | Age: 82
End: 2022-02-28

## 2022-02-28 NOTE — TELEPHONE ENCOUNTER
Pt's daughter letting PCP know that Cymbalta decrease in Pt's medication has helped & Pt is doing well. Pt needs to know when she should follow up next? Please Call daughter Boyd Ny back.

## 2022-04-19 PROCEDURE — 93270 REMOTE 30 DAY ECG REV/REPORT: CPT | Performed by: INTERNAL MEDICINE

## 2022-04-21 ENCOUNTER — OFFICE VISIT (OUTPATIENT)
Dept: CARDIOLOGY CLINIC | Age: 82
End: 2022-04-21
Payer: MEDICARE

## 2022-04-21 ENCOUNTER — TELEPHONE (OUTPATIENT)
Dept: CARDIOLOGY CLINIC | Age: 82
End: 2022-04-21

## 2022-04-21 VITALS
WEIGHT: 165.5 LBS | BODY MASS INDEX: 26.6 KG/M2 | HEIGHT: 66 IN | SYSTOLIC BLOOD PRESSURE: 130 MMHG | HEART RATE: 48 BPM | OXYGEN SATURATION: 95 % | DIASTOLIC BLOOD PRESSURE: 82 MMHG

## 2022-04-21 DIAGNOSIS — I48.4 ATYPICAL ATRIAL FLUTTER (HCC): Primary | ICD-10-CM

## 2022-04-21 DIAGNOSIS — I48.3 TYPICAL ATRIAL FLUTTER (HCC): ICD-10-CM

## 2022-04-21 PROCEDURE — 93000 ELECTROCARDIOGRAM COMPLETE: CPT | Performed by: INTERNAL MEDICINE

## 2022-04-21 PROCEDURE — 99214 OFFICE O/P EST MOD 30 MIN: CPT | Performed by: INTERNAL MEDICINE

## 2022-04-21 NOTE — TELEPHONE ENCOUNTER
Monitor placed by [facility] [employer]  Length of monitor 30 days  Monitor ordered by [address]  Serial number [id number]    Activation successful prior to pt leaving office?  Yes

## 2022-04-21 NOTE — PATIENT INSTRUCTIONS
RECOMMENDATIONS:  1. Decrease metoprolol to 25 mg once daily. 2. Recommend [employer] Mobile to monitor HR and rhythm with sweating episodes or unsteadiness. Send us readings through [address] 3. 4 week cardiac event monitor. 4. Follow up in 3 months.

## 2022-04-21 NOTE — PROGRESS NOTES
[facility]   Electrophysiology Consult Note    Date: 4/21/22  Patient Name: Ravi Snyder  YOB: 1940    Primary Care Physician: Jeniffer Alfredo MD    CHIEF COMPLAINT:   Chief Complaint   Patient presents with    Follow-up    Hypertension    Other     atrial flutter     Medication Refill     90 day supply metoprolol succ. HISTORY OF PRESENT ILLNESS: Ravi Snyder is a 80 y.o. female with a history of atrial flutter, AT, HTN, mitral regurgitation, and mild mitral stenosis. She was admitted in 1/2016 with atrial flutter with RVR but was asymptomatic with the arrhythmia. ADAM in 1/2016 showed moderate to severe MR. In 1/2016 she had RFCA of a left sided atypical atrial flutter and left sided AT, originating from right superior pulmonary vein with Dr. Gwynda Baumgarten. She returned in 1/2016 and her EKG showed atrial flutter. Echo in 1/2016 showed an EF of 55%. Repeat echo in 11/2016 showed an EF of 55% and moderate MR. She has been in sinus rhythm at subsequent visits. She has recent stress due to family moving in with her. Has some memory loss and some fatigue. Was recently started on losartan and reports BP is fluctuating at home. On 4/15/2021, patient's ECG demonstrates SR w/ first degree AV block. . Patient reports she is doing well. She reports she was COVID positive in January 2020 requiring hospitalization. She reports she is not 100% but is feeling much better. She reports she just got her first COVID vaccine yesterday and tolerated that well. She reports she tries to stay active by walking in her neighborhood and tolerates that activity well. Interval History since last office visit: Today, 4/21/2022, ECG demonstrates SB (48). She reports that she is doing ok. Her daughter thinks that she has slowed down a bit in energy levels. She is dealing with memory issues. She denies dizziness. She has sweating at times. She has started using furniture as assistance for walking.  She is asymptomatic with her AF. She is taking her medications as prescribed. Patient denies current edema, chest pain, sob, palpitations, or syncope. Past Medical History:   has a past medical history of Atrial flutter ([address] Atrial tachycardia ([address] CTS (carpal tunnel syndrome), HLD (hyperlipidemia), Hypertension, Mitral regurgitation, Mixed hyperlipidemia, Osteopenia, and Shingles. Past Surgical History:   has a past surgical history that includes knee surgery (Left, 2010); Colonoscopy; ablation of dysrhythmic focus (1/13/16); Cataract removal with implant (Left, 05/01/2018); and eye surgery (Right, 06/05/2018). Allergies:  Lisinopril    Social History:   reports that she has never smoked. She has never used smokeless tobacco. She reports that she does not drink alcohol and does not use drugs. Family History: family history includes Cancer in her father and mother; High Cholesterol in her brother; Other in her mother; Stroke in her brother. Home Medications:    Prior to Admission medications    Medication Sig Start Date End Date Taking?  Authorizing Provider   DULoxetine (CYMBALTA) 30 MG extended release capsule Take 1 capsule by mouth daily 1/26/22  Yes Sindhu Tineo MD   donepezil (ARICEPT) 10 MG tablet TAKE ONE TABLET BY MOUTH ONCE NIGHTLY 1/18/22  Yes NICKY Hale - CNP   atorvastatin (LIPITOR) 10 MG tablet TAKE ONE TABLET BY MOUTH DAILY 1/5/22  Yes Sindhu Tineo MD   dilTIAZem (TIAZAC) 180 MG extended release capsule TAKE ONE CAPSULE BY MOUTH DAILY 1/5/22  Yes Sindhu Tineo MD   ELIQUIS 5 MG TABS tablet TAKE ONE TABLET BY MOUTH TWICE A DAY 11/15/21  Yes Sindhu Tineo MD   losartan (COZAAR) 100 MG tablet TAKE ONE TABLET BY MOUTH DAILY 11/15/21  Yes Sindhu Tineo MD   metoprolol succinate (TOPROL XL) 25 MG extended release tablet Take 1 tablet by mouth 2 times daily 4/15/21  Yes Andrea King MD   Cholecalciferol (VITAMIN D3) 50 MCG (2000 UT) CAPS Take by mouth   Yes for follow-up. According patient she is been doing well. She has been tolerating all her medications. She recently lost approximately 15 pounds after COVID-19 infection. She is located back on her weight. Her weight today is approximate 63 kg. Her renal function is normal.  Due to the fact that she is gaining weight I will hold off on decreasing her apixaban for now. As she is asymptomatic I think be reasonable to continue current course with anticoagulation and rate control. I will transition her to metoprolol succinate from Toprol tartrate given increased half-life. I will asked that she follow-up with EP NP in 1 year or as needed. -  - Switch from metoprolol tartrate to metoprolol succinate.  - Continue apixaban with low threshold to decrease to 2.5 mg BID.  - Continue diltiazem 180 mg daily. Low threshold to come off and increase metoprolol.  - Follow up with EP NP in 1 year unless/until procedure/discussion required or PRN.    4/21/2022   Patient presents for follow up. Unclear if symptomatic from metoprolol. We suggested [employer] mobile or cardiac monitor for atypical symptoms she is having. Patient and family would prefer a cardiac monitor and so we will place.  - Decrease metoprolol to 25 mg daily.  - Four week cardiac monitor.  - Continue apixaban 5 mg BID.  - Follow up in 3 months. 2. Hypertension. Blood pressure well controlled. - Continue current course. 4/21/2022  - Plan as per above. RECOMMENDATIONS:  1. Decrease metoprolol to 25 mg once daily. 2. Recommend [employer] Mobile to monitor HR and rhythm with sweating episodes or unsteadiness. Send us readings through [address] 3. 4 week cardiac event monitor. 4. Follow up in 3 months. QUALITY MEASURES  1. Tobacco Cessation Counseling: NA  2. Retake of BP if >140/90:   NA  3. Documentation to PCP/referring for new patient:  Sent to PCP at close of office visit  4. CAD patient on anti-platelet: NA  5.  CAD patient on STATIN therapy: NA  6. Patient with CHF and aFib on anticoagulation:  Yes, Eliquis. All questions and concerns were addressed to the patient/family. Alternatives to my treatment were discussed. Dr. Jatin Cedillo MD  [place] [address]. [address] [place] [address]. [address]. [address] [place] [address], [address]  Phone: [phone]  Fax: [phone]     NOTE: This report was transcribed using voice recognition software. Every effort was made to ensure accuracy, however, inadvertent computerized transcription errors may be present. Roosevelt Macdonadl RN, am scribing for and in the presence of Dr. Mickey Akbar. 04/21/22 12:02 PM   Jac Arnold RN    I reviewed with the resident the medical history and the resident's findings on the physical examination. I discussed with the resident the patient's diagnosis and concur with the plan.

## 2022-04-22 RX ORDER — METOPROLOL SUCCINATE 25 MG/1
25 TABLET, EXTENDED RELEASE ORAL DAILY
Qty: 180 TABLET | Refills: 3 | Status: SHIPPED | OUTPATIENT
Start: 2022-04-22

## 2022-04-28 ENCOUNTER — TELEPHONE (OUTPATIENT)
Dept: CARDIOLOGY CLINIC | Age: 82
End: 2022-04-28

## 2022-04-29 NOTE — TELEPHONE ENCOUNTER
Spoke with patient's daughter and relayed [phone] [facility] message.  She V/U.

## 2022-05-31 ENCOUNTER — PATIENT MESSAGE (OUTPATIENT)
Dept: FAMILY MEDICINE CLINIC | Age: 82
End: 2022-05-31

## 2022-06-01 NOTE — TELEPHONE ENCOUNTER
From: Malathi Garza  To: Dr. Justen Mejia: 5/31/2022 7:12 AM EDT  Subject: Neuropsychological evaluation    Hi Dr. Savanna Padilla next appointment with you is now set for Wednesday 6/8. I mentioned in our previous message that Dr. Romel Bunch made same recommendations for mom in light of her advancing dementia. My sister and I are anticipating your thoughts and recommendations on all of them including medication, physical therapy and a DRIVING ASSESSMENT. Driving is especially sensitive for mom and as the daughters caring for a number of her needs now we are hoping you will not mind addressing the driving as we all of course want her and other passengers/drivers to be safe. She respects and trusts you very highly. Looking forward to seeing you next week! Thank you!   Morgan Bain

## 2022-06-08 ENCOUNTER — OFFICE VISIT (OUTPATIENT)
Dept: FAMILY MEDICINE CLINIC | Age: 82
End: 2022-06-08
Payer: MEDICARE

## 2022-06-08 VITALS
WEIGHT: 163 LBS | OXYGEN SATURATION: 97 % | BODY MASS INDEX: 26.2 KG/M2 | SYSTOLIC BLOOD PRESSURE: 130 MMHG | HEIGHT: 66 IN | DIASTOLIC BLOOD PRESSURE: 70 MMHG | HEART RATE: 58 BPM

## 2022-06-08 DIAGNOSIS — G89.29 CHRONIC RIGHT-SIDED LOW BACK PAIN WITHOUT SCIATICA: ICD-10-CM

## 2022-06-08 DIAGNOSIS — R19.7 DIARRHEA, UNSPECIFIED TYPE: ICD-10-CM

## 2022-06-08 DIAGNOSIS — M54.50 CHRONIC RIGHT-SIDED LOW BACK PAIN WITHOUT SCIATICA: ICD-10-CM

## 2022-06-08 DIAGNOSIS — R53.1 GENERALIZED WEAKNESS: ICD-10-CM

## 2022-06-08 DIAGNOSIS — F02.80 ALZHEIMER DISEASE (HCC): Primary | ICD-10-CM

## 2022-06-08 DIAGNOSIS — G30.9 ALZHEIMER DISEASE (HCC): Primary | ICD-10-CM

## 2022-06-08 PROCEDURE — 1123F ACP DISCUSS/DSCN MKR DOCD: CPT | Performed by: FAMILY MEDICINE

## 2022-06-08 PROCEDURE — 99214 OFFICE O/P EST MOD 30 MIN: CPT | Performed by: FAMILY MEDICINE

## 2022-06-08 RX ORDER — MEMANTINE HYDROCHLORIDE 5 MG/1
5 TABLET ORAL 2 TIMES DAILY
Qty: 180 TABLET | Refills: 0 | Status: SHIPPED | OUTPATIENT
Start: 2022-06-08 | End: 2022-09-14

## 2022-06-08 NOTE — PROGRESS NOTES
Assessment/Plan:    Jossy Kwan was seen today for follow-up. Diagnoses and all orders for this visit:    Alzheimer disease ([facility] [address]  -     memantine (NAMENDA) 5 MG tablet; Take 1 tablet by mouth 2 times daily, new med, to add to aricept 10 mg qd. Gradually increase dose. It is noted that namenda ER is not covered on pt's insur. Discussed driving eval. Pt is agreeable and order faxed to [address] [place] [address] at [phone]. Generalized weakness  -     [facility]   Likely 2/2 age, OA    Chronic right-sided low back pain without sciatica  -     [facility]   Chronic, possibly 2/2 strain or lumbar oa    Diarrhea, unspecified type   Infrequent, possibly 2/2 stress as last 2 episodes happened before medical appts. There are no Patient Instructions on file for this visit. Patient: Ceasar Flores is a 80 y. o.female who presents today with the following Chief Complaint(s):  Chief Complaint   Patient presents with    Follow-up         HPI: Pt with hx of mild neurocognitive d/o per 4/2/21 neuropsych testing had 1 yr f/u by Dr Mehreen Dash at [facility]. Pt's dx was adjusted to AD. Pt is brought in by dtr Maria T Hines to review neuropsych findings. Dr Sirisha Kent rec'd continuing aricept 10 and adding mematine to slow decline. Routine exercise was encouraged, and pt is active with great grandson who lives with pt. Pt also walks up and down long driveway, uses trekking poles for balance. Pt reported lack of energy to Dr Sirisha Kent who in turn rec'd PT eval. Pt is interested as she has chronic R lbp, worse with walking. Denies pain with sitting. 1 wk ago tripped coming up few steps on deck. Has occ random urgent diarhea, carries disposable undergarments in case of accident. No asso with foods. Had episode prior to today's office appt. Has had prior to previous medical appts.     No further chest pain, sweating as per last visit. Current Outpatient Medications   Medication Sig Dispense Refill    memantine (NAMENDA) 5 MG tablet Take 1 tablet by mouth 2 times daily 180 tablet 0    metoprolol succinate (TOPROL XL) 25 MG extended release tablet Take 1 tablet by mouth daily 180 tablet 3    DULoxetine (CYMBALTA) 30 MG extended release capsule Take 1 capsule by mouth daily 90 capsule 3    donepezil (ARICEPT) 10 MG tablet TAKE ONE TABLET BY MOUTH ONCE NIGHTLY 90 tablet 3    atorvastatin (LIPITOR) 10 MG tablet TAKE ONE TABLET BY MOUTH DAILY 90 tablet 3    dilTIAZem (TIAZAC) 180 MG extended release capsule TAKE ONE CAPSULE BY MOUTH DAILY 90 capsule 3    ELIQUIS 5 MG TABS tablet TAKE ONE TABLET BY MOUTH TWICE A  tablet 3    losartan (COZAAR) 100 MG tablet TAKE ONE TABLET BY MOUTH DAILY 90 tablet 3    Cholecalciferol (VITAMIN D3) 50 MCG (2000 UT) CAPS Take by mouth       No current facility-administered medications for this visit. Patient's past medical history,surgical history, family history, medications,  and allergies  were all reviewed and updated as appropriate today. Review of Systems  abv    Physical Exam  Constitutional:       Appearance: Normal appearance. HENT:      Head: Normocephalic. Cardiovascular:      Rate and Rhythm: Normal rate. Heart sounds: Murmur (2/6 JACKELINE) heard. Pulmonary:      Effort: Pulmonary effort is normal.      Breath sounds: Normal breath sounds. Musculoskeletal:      Cervical back: No muscular tenderness. Skin:     General: Skin is warm and dry. Neurological:      Mental Status: She is alert.    Psychiatric:         Mood and Affect: Mood normal.           /70   Pulse 58   Ht 5' 6\" (1.676 m)   Wt 163 lb (73.9 kg)   SpO2 97%   BMI 26.31 kg/m²

## 2022-06-14 PROCEDURE — 93272 ECG/REVIEW INTERPRET ONLY: CPT | Performed by: INTERNAL MEDICINE

## 2022-07-07 ENCOUNTER — TELEPHONE (OUTPATIENT)
Dept: CARDIOLOGY CLINIC | Age: 82
End: 2022-07-07

## 2022-07-07 NOTE — TELEPHONE ENCOUNTER
Attempted to reach patient regarding monitor results. No significant arrhythmia on monitor. Keep f/u with [facility] as scheduled.

## 2022-07-08 DIAGNOSIS — I48.4 ATYPICAL ATRIAL FLUTTER (HCC): ICD-10-CM

## 2022-07-08 NOTE — TELEPHONE ENCOUNTER
Ryanne Da Silva pt's dtr called back and was given monitor results and to have pt keep followup.  Ryanne Da Silva v/u

## 2022-07-13 NOTE — PROGRESS NOTES
[facility], [place], operated by [facility]   Electrophysiology Consult Note    Date: 7/19/22  Patient Name: Arianna Mcfarland  YOB: 1940    Primary Care Physician: Lukas Crowley MD    CHIEF COMPLAINT:   Chief Complaint   Patient presents with    3 Month Follow-Up    Other     Atrial flutter      HISTORY OF PRESENT ILLNESS: Arianna Mcfarland is a 80 y.o. female with a history of atrial flutter, AT, HTN, mitral regurgitation, and mild mitral stenosis. She was admitted in 1/2016 with atrial flutter with RVR but was asymptomatic with the arrhythmia. ADAM in 1/2016 showed moderate to severe MR. In 1/2016 she had RFCA of a left sided atypical atrial flutter and left sided AT, originating from right superior pulmonary vein with Dr. Joana Goldsmith. She returned in 1/2016 and her EKG showed atrial flutter. Echo in 1/2016 showed an EF of 55%. Repeat echo in 11/2016 showed an EF of 55% and moderate MR. She has been in sinus rhythm at subsequent visits. She has recent stress due to family moving in with her. Has some memory loss and some fatigue. Was recently started on losartan and reports BP is fluctuating at home. On 4/15/2021, patient's ECG demonstrates SR w/ first degree AV block. . Patient reports she is doing well. She reports she was COVID positive in January 2020 requiring hospitalization. She reports she is not 100% but is feeling much better. She reports she just got her first COVID vaccine yesterday and tolerated that well. She reports she tries to stay active by walking in her neighborhood and tolerates that activity well. On 4/21/2022, she reported that she is doing ok. Her daughter thinks that she has slowed down a bit in energy levels. She is dealing with memory issues. She has started using furniture as assistance for walking. She is asymptomatic with her AF. Interval History since last office visit: At her office visit on 04/21/2022, her Metoprolol was decreased to 25 mg daily due to bradycardia.  Patient wore a cardiac event monitor from 04/21/2022 to 05/19/2022 which demonstrated predominately SR with an average HR of 60 (). PAC burden 0.99%, PVC burden <0.01%. On 04/29/2022 the patients daughter contacted the office stating her heart rate was still in the lower 40's and her mother was asymptomatic. Her Metoprolol was decreased again to 12.5 mg daily. Today, 07/19/2022, ECG demonstrates SB (44 BPM). Patient states that she is feeling fine since decreasing the Metoprolol dosage. Patient denies current edema, chest pain, sob, palpitations, dizziness or syncope. She denies any recent falls. Patient is taking all cardiac medications as prescribed and tolerates them well. Past Medical History:   has a past medical history of Atrial flutter ([address] Atrial tachycardia ([address] CTS (carpal tunnel syndrome), HLD (hyperlipidemia), Hypertension, Mitral regurgitation, Mixed hyperlipidemia, Osteopenia, and Shingles. Past Surgical History:   has a past surgical history that includes knee surgery (Left, 2010); Colonoscopy; ablation of dysrhythmic focus (1/13/16); Cataract removal with implant (Left, 05/01/2018); and eye surgery (Right, 06/05/2018). Allergies:  Lisinopril    Social History:   reports that she has never smoked. She has never used smokeless tobacco. She reports that she does not drink alcohol and does not use drugs. Family History: family history includes Cancer in her father and mother; High Cholesterol in her brother; Other in her mother; Stroke in her brother. Home Medications:    Prior to Admission medications    Medication Sig Start Date End Date Taking?  Authorizing Provider   metoprolol succinate (TOPROL XL) 25 MG extended release tablet Take 1 tablet by mouth daily  Patient taking differently: Take 12.5 mg by mouth in the morning. 4/22/22  Yes Karthik Martinez MD   DULoxetine (CYMBALTA) 30 MG extended release capsule Take 1 capsule by mouth daily 1/26/22  Yes Marquis Mac MD donepezil (ARICEPT) 10 MG tablet TAKE ONE TABLET BY MOUTH ONCE NIGHTLY 1/18/22  Yes Elayne Mccarthy APRN - CNP   atorvastatin (LIPITOR) 10 MG tablet TAKE ONE TABLET BY MOUTH DAILY 1/5/22  Yes Guevara Irvin MD   dilTIAZem (TIAZAC) 180 MG extended release capsule TAKE ONE CAPSULE BY MOUTH DAILY 1/5/22  Yes Guevara Irvin MD   ELIQUIS 5 MG TABS tablet TAKE ONE TABLET BY MOUTH TWICE A DAY 11/15/21  Yes Guevara Irvin MD   losartan (COZAAR) 100 MG tablet TAKE ONE TABLET BY MOUTH DAILY 11/15/21  Yes Guevara Irvin MD   Cholecalciferol (VITAMIN D3) 50 MCG (2000 UT) CAPS Take by mouth   Yes Historical Provider, MD   memantine (NAMENDA) 5 MG tablet Take 1 tablet by mouth 2 times daily 6/8/22   Guevara Irvin MD       REVIEW OF SYSTEMS:    All 14-point review of systems are completed and  pertinent positives are mentioned in the history of present illness. Other  systems are reviewed and are negative. Physical Examination:    /76   Pulse 63   Ht 5' 6\" (1.676 m)   Wt 166 lb (75.3 kg)   SpO2 94%   BMI 26.79 kg/m²      Constitutional and General Appearance:    alert, cooperative, no distress and appears stated age  [de-identified]:    PERRLA, no cervical lymphadenopathy. No masses palpable. Normal oral  mucosa  Respiratory:  Normal excursion and expansion without use of accessory muscles  Resp Auscultation: Normal breath sounds without dullness or wheezing  Cardiovascular: The apical impulse is not displaced  RRR S1S2 w/o M/G/R  Abdomen:  No masses or tenderness  Bowel sounds present  Extremities:   No Cyanosis or Clubbing   Lower extremity edema: No   Skin: Warm and dry  Neurological:  Alert and oriented. Moves all extremities well  No abnormalities of mood, affect, memory, mentation, or behavior are noted    DATA:    ECG 7/19/22: Personally reviewed. Stress Test 02/24/2022  Summary   No evidence of significant myocardial ischemia or scar. Normal LV size and systolic function. Calculated LVEF of >65%. Overall findings represent a low risk scan. ECHO 11/29/2016:    Summary   Normal left ventricle size with mild concentric left ventricular   hypertrophy. Normal systolic function with an estimated ejection fraction of 55%. No regional wall motion abnormalities are seen. Elevated left ventricular diastolic filling pressure ( E/e' 14.5 ). The left atrium is severely dilated. Moderate posterior mitral annular calcification is present. Moderate mitral regurgitation. Aortic valve appears sclerotic but opens adequately. IMPRESSION:    1. Atrial fibrillation/flutter/tachycardia ([id number] score 4)  04/15/2021  Patient is a pleasant 49-year-old female with a medical history significant for atypical left-sided flutter and left-sided atrial tachycardia, hypertension, hyperlipidemia, mild mitral valve stenosis and dementia who presents from home for follow-up. According patient she is been doing well. She has been tolerating all her medications. She recently lost approximately 15 pounds after COVID-19 infection. She is located back on her weight. Her weight today is approximate 63 kg. Her renal function is normal.  Due to the fact that she is gaining weight I will hold off on decreasing her apixaban for now. As she is asymptomatic I think be reasonable to continue current course with anticoagulation and rate control. I will transition her to metoprolol succinate from Toprol tartrate given increased half-life. I will asked that she follow-up with EP NP in 1 year or as needed. -  - Switch from metoprolol tartrate to metoprolol succinate.  - Continue apixaban with low threshold to decrease to 2.5 mg BID.  - Continue diltiazem 180 mg daily. Low threshold to come off and increase metoprolol.  - Follow up with EP NP in 1 year unless/until procedure/discussion required or PRN.    4/21/2022   Patient presents for follow up. Unclear if symptomatic from metoprolol.   We suggested Meena ball or cardiac monitor for atypical symptoms she is having. Patient and family would prefer a cardiac monitor and so we will place.  - Decrease metoprolol to 25 mg daily.  - Four week cardiac monitor.  - Continue apixaban 5 mg BID.  - Follow up in 3 months. 07/19/2022  Patient presents for follow up. She has been doing well since we decreased her metoprolol. Her cardiac monitor showed no significant arrhythmias. We will have her follow up with EPNP.  - Continue apixaban 5 mg BID.  - Continue metoprolol 12.5 mg daily. - Continue diltiazem. - Follow up with EP NP unless/until procedure/discussion required or PRN. 2. Hypertension. Blood pressure well controlled. - Continue current course. 4/21/2022 - 07/19/2022  - Plan as per above. RECOMMENDATIONS:  1. Continue current cardiac medications as prescribed. 2. Continue current treatment course. 3. Follow up with [address] [place] [address] 200 in 6 months. QUALITY MEASURES  1. Tobacco Cessation Counseling: NA  2. Retake of BP if >140/90:   NA  3. Documentation to PCP/referring for new patient:  Sent to PCP at close of office visit  4. CAD patient on anti-platelet: NA  5. CAD patient on STATIN therapy:  NA  6. Patient with CHF and aFib on anticoagulation:  Yes, Eliquis. All questions and concerns were addressed to the patient/family. Alternatives to my treatment were discussed. Dr. Huy Molina MD  Electrophysiology  [facility]. [address] [place] [address]. [address]. [place], [address]  Phone: [phone]  Fax: [phone]     NOTE: This report was transcribed using voice recognition software. Every effort was made to ensure accuracy, however, inadvertent computerized transcription errors may be present. Cyril Rashid RN, am scribing for and in the presence of Dr. Aubrey Love. 07/19/22 11:32 AM   Ashtyn Lao,     The scribe's documentation has been prepared under my direction and personally reviewed by me in its entirety.  I confirm that the note above accurately reflects all work, physical examination, the discussion of treatments and procedures, and medical decision making performed by me. Franklin Cody MD personally performed the services described in this documentation as scribed by nurse in my presence, and is both accurate and complete.     Electronically signed by Shane Daigle MD on 7/19/2022 at 1:04 PM

## 2022-07-19 ENCOUNTER — TELEPHONE (OUTPATIENT)
Dept: CARDIOLOGY CLINIC | Age: 82
End: 2022-07-19

## 2022-07-19 ENCOUNTER — OFFICE VISIT (OUTPATIENT)
Dept: CARDIOLOGY CLINIC | Age: 82
End: 2022-07-19
Payer: MEDICARE

## 2022-07-19 VITALS
OXYGEN SATURATION: 94 % | HEART RATE: 63 BPM | HEIGHT: 66 IN | DIASTOLIC BLOOD PRESSURE: 76 MMHG | BODY MASS INDEX: 26.68 KG/M2 | WEIGHT: 166 LBS | SYSTOLIC BLOOD PRESSURE: 122 MMHG

## 2022-07-19 DIAGNOSIS — I48.4 ATYPICAL ATRIAL FLUTTER (HCC): Primary | ICD-10-CM

## 2022-07-19 PROCEDURE — 93000 ELECTROCARDIOGRAM COMPLETE: CPT | Performed by: INTERNAL MEDICINE

## 2022-07-19 PROCEDURE — 1123F ACP DISCUSS/DSCN MKR DOCD: CPT | Performed by: INTERNAL MEDICINE

## 2022-07-19 PROCEDURE — 99214 OFFICE O/P EST MOD 30 MIN: CPT | Performed by: INTERNAL MEDICINE

## 2022-07-19 NOTE — PATIENT INSTRUCTIONS
RECOMMENDATIONS:  1. Continue current cardiac medications as prescribed. 2. Continue current treatment course. 3. Follow up with [facility] [address] [place] in 6 months.

## 2022-07-19 NOTE — TELEPHONE ENCOUNTER
07/19-Spoke to pts daughter, appt has been cxl and rescheduled for [facility] 02/09/2023 at 11:15 am at [facility].

## 2022-09-14 ENCOUNTER — OFFICE VISIT (OUTPATIENT)
Dept: FAMILY MEDICINE CLINIC | Age: 82
End: 2022-09-14
Payer: MEDICARE

## 2022-09-14 VITALS
HEIGHT: 66 IN | SYSTOLIC BLOOD PRESSURE: 98 MMHG | DIASTOLIC BLOOD PRESSURE: 60 MMHG | HEART RATE: 64 BPM | BODY MASS INDEX: 27.16 KG/M2 | OXYGEN SATURATION: 95 % | WEIGHT: 169 LBS

## 2022-09-14 DIAGNOSIS — Z23 NEEDS FLU SHOT: ICD-10-CM

## 2022-09-14 DIAGNOSIS — F02.80 ALZHEIMER DISEASE (HCC): Primary | ICD-10-CM

## 2022-09-14 DIAGNOSIS — G30.9 ALZHEIMER DISEASE (HCC): Primary | ICD-10-CM

## 2022-09-14 PROCEDURE — 1123F ACP DISCUSS/DSCN MKR DOCD: CPT | Performed by: FAMILY MEDICINE

## 2022-09-14 PROCEDURE — G0008 ADMIN INFLUENZA VIRUS VAC: HCPCS | Performed by: FAMILY MEDICINE

## 2022-09-14 PROCEDURE — 99214 OFFICE O/P EST MOD 30 MIN: CPT | Performed by: FAMILY MEDICINE

## 2022-09-14 PROCEDURE — 90694 VACC AIIV4 NO PRSRV 0.5ML IM: CPT | Performed by: FAMILY MEDICINE

## 2022-09-14 RX ORDER — MEMANTINE HYDROCHLORIDE 5 MG/1
TABLET ORAL
Qty: 98 TABLET | Refills: 0 | Status: SHIPPED | OUTPATIENT
Start: 2022-09-14

## 2022-09-14 RX ORDER — MEMANTINE HYDROCHLORIDE 10 MG/1
10 TABLET ORAL 2 TIMES DAILY
Qty: 180 TABLET | Refills: 3 | Status: SHIPPED | OUTPATIENT
Start: 2022-09-14

## 2022-09-14 NOTE — PROGRESS NOTES
Assessment/Plan:    Denis Cornejo was seen today for medication check. Diagnoses and all orders for this visit:    Alzheimer disease ([facility] [address]   Cont aricept 10 qhs. Increase namenda 5 1 bid to:  -     memantine (NAMENDA) 5 MG tablet; 1 po am and 2 po qhs x 2 wk, then 2 po bid  -     memantine (NAMENDA) 10 MG tablet; Take 1 tablet by mouth 2 times daily. Ten mg rx is printed, to be filled when current rx is almost out. Pt reportedly is driving minimal distances, largely to very nearby [place]. Dtr [facility] reportedly is comfortable with limited driving. Discussed with [facility] that AD is progressive and although her sister has not noted improved memory since addition of Namenda, It's possible that memory loss my have been more progressive w/o rx. Pt tolerates namenda w/o TRACEY and therefore higher dose trial is planned. Pt     Needs flu shot  -     Influenza, FLUAD, (age 72 y+), IM, Preservative Free, 0.5 mL    30 min visit. There are no Patient Instructions on file for this visit. Patient: Keiry Canales is a 80 y. o.female who presents today with the following Chief Complaint(s):  Chief Complaint   Patient presents with    Medication Check     Review driving. HPI: Pt with hx of mild neurocognitive d/o per 4/2/21 neuropsych testing had 1 yr f/u by Dr Angela Ritter at [facility]. Pt's dx was adjusted to AD. Pt is brought in by dtr [facility] to review namenda 5 that was added to aricept 10 6/2022. Dtr Demetra Winters lives with pt and has not noted any memory improvement since starting namenda. Pt completed driving eval 1/2768 by Howard Santana and was approved to drive locally.  Dtr confirms dtr drives safely and pt denies getting lost.         Current Outpatient Medications   Medication Sig Dispense Refill    memantine (NAMENDA) 5 MG tablet 1 po am and 2 po qhs x 2 wk, then 2 po bid 98 tablet 0    memantine (NAMENDA) 10 MG tablet Take 1 tablet by mouth 2 times daily 180 tablet 3    metoprolol succinate (TOPROL XL) 25 MG extended release tablet Take 1 tablet by mouth daily (Patient taking differently: Take 12.5 mg by mouth daily) 180 tablet 3    DULoxetine (CYMBALTA) 30 MG extended release capsule Take 1 capsule by mouth daily 90 capsule 3    donepezil (ARICEPT) 10 MG tablet TAKE ONE TABLET BY MOUTH ONCE NIGHTLY 90 tablet 3    atorvastatin (LIPITOR) 10 MG tablet TAKE ONE TABLET BY MOUTH DAILY 90 tablet 3    dilTIAZem (TIAZAC) 180 MG extended release capsule TAKE ONE CAPSULE BY MOUTH DAILY 90 capsule 3    ELIQUIS 5 MG TABS tablet TAKE ONE TABLET BY MOUTH TWICE A  tablet 3    losartan (COZAAR) 100 MG tablet TAKE ONE TABLET BY MOUTH DAILY 90 tablet 3    Cholecalciferol (VITAMIN D3) 50 MCG (2000 UT) CAPS Take by mouth       No current facility-administered medications for this visit. Patient's past medical history,surgical history, family history, medications,  and allergies  were all reviewed and updated as appropriate today. Review of Systems  As above    Physical Exam  Constitutional:       Appearance: Normal appearance. She is well-developed. HENT:      Head: Normocephalic and atraumatic. Right Ear: External ear normal.      Left Ear: External ear normal.   Eyes:      General: No scleral icterus. Right eye: No discharge. Left eye: No discharge. Extraocular Movements: Extraocular movements intact. Conjunctiva/sclera: Conjunctivae normal.   Neck:      Comments: No visualized masses  FROM  Pulmonary:      Effort: Pulmonary effort is normal.   Musculoskeletal:         General: Normal range of motion. Skin:     General: Skin is warm and dry. Neurological:      Mental Status: She is alert and oriented to person, place, and time. Mental status is at baseline. Comments: Short term memory loss noted, though pt is insightful about deficit, is aware of impaired memory.    Psychiatric:         Mood and Affect: Mood normal.         Behavior: Behavior normal. Thought Content:  Thought content normal.         Judgment: Judgment normal.         BP 98/60   Pulse 64   Ht 5' 6\" (1.676 m)   Wt 169 lb (76.7 kg)   SpO2 95%   BMI 27.28 kg/m²

## 2022-10-10 DIAGNOSIS — F02.80 ALZHEIMER DISEASE (HCC): ICD-10-CM

## 2022-10-10 DIAGNOSIS — G30.9 ALZHEIMER DISEASE (HCC): ICD-10-CM

## 2022-10-10 RX ORDER — MEMANTINE HYDROCHLORIDE 5 MG/1
TABLET ORAL
Qty: 98 TABLET | Refills: 0 | OUTPATIENT
Start: 2022-10-10

## 2022-10-10 NOTE — TELEPHONE ENCOUNTER
LOV 9/14/2022    Future Appointments   Date Time Provider Carolina Alves   12/14/2022  1:30 PM MD CHIO Wallcae - [place]   2/9/2023 11:00 AM NICKY Llanos - [place]ORES

## 2022-11-10 DIAGNOSIS — I10 HYPERTENSION, UNSPECIFIED TYPE: ICD-10-CM

## 2022-11-10 RX ORDER — LOSARTAN POTASSIUM 100 MG/1
TABLET ORAL
Qty: 90 TABLET | Refills: 3 | Status: SHIPPED | OUTPATIENT
Start: 2022-11-10

## 2022-11-10 NOTE — TELEPHONE ENCOUNTER
Last Office Visit  -  09/14/2022  Next Office Visit  -  12/14/2022    Last Filled  -    Last UDS -    Contract -

## 2023-01-02 DIAGNOSIS — I48.4 ATYPICAL ATRIAL FLUTTER (HCC): ICD-10-CM

## 2023-01-02 RX ORDER — DILTIAZEM HYDROCHLORIDE 180 MG/1
CAPSULE, EXTENDED RELEASE ORAL
Qty: 90 CAPSULE | Refills: 3 | Status: SHIPPED | OUTPATIENT
Start: 2023-01-02

## 2023-01-10 DIAGNOSIS — I48.4 ATYPICAL ATRIAL FLUTTER (HCC): ICD-10-CM

## 2023-01-11 RX ORDER — APIXABAN 5 MG/1
TABLET, FILM COATED ORAL
Qty: 180 TABLET | Refills: 3 | Status: SHIPPED | OUTPATIENT
Start: 2023-01-11

## 2023-01-11 NOTE — TELEPHONE ENCOUNTER
Last Office Visit  -  09/14/2022  Next Office Visit  -  02/15/2023    Last Filled  -    Last UDS -    Contract -

## 2023-01-20 ENCOUNTER — HOSPITAL ENCOUNTER (INPATIENT)
Age: 83
LOS: 2 days | Discharge: HOME | DRG: 193 | End: 2023-01-22
Attending: EMERGENCY MEDICINE | Admitting: INTERNAL MEDICINE
Payer: MEDICARE

## 2023-01-20 ENCOUNTER — TELEMEDICINE (OUTPATIENT)
Dept: FAMILY MEDICINE CLINIC | Age: 83
End: 2023-01-20
Payer: MEDICARE

## 2023-01-20 ENCOUNTER — APPOINTMENT (OUTPATIENT)
Dept: GENERAL RADIOLOGY | Age: 83
DRG: 193 | End: 2023-01-20
Payer: MEDICARE

## 2023-01-20 DIAGNOSIS — J96.01 ACUTE RESPIRATORY FAILURE WITH HYPOXIA (HCC): ICD-10-CM

## 2023-01-20 DIAGNOSIS — F41.9 ANXIETY: ICD-10-CM

## 2023-01-20 DIAGNOSIS — R09.02 OXYGEN DESATURATION: Primary | ICD-10-CM

## 2023-01-20 DIAGNOSIS — J18.9 PNEUMONIA DUE TO INFECTIOUS ORGANISM, UNSPECIFIED LATERALITY, UNSPECIFIED PART OF LUNG: Primary | ICD-10-CM

## 2023-01-20 DIAGNOSIS — R05.1 ACUTE COUGH: ICD-10-CM

## 2023-01-20 PROBLEM — J15.3: Status: ACTIVE | Noted: 2023-01-20

## 2023-01-20 LAB
A/G RATIO: 1 (ref 1.1–2.2)
ALBUMIN SERPL-MCNC: 3.9 G/DL (ref 3.4–5)
ALP BLD-CCNC: 111 U/L (ref 40–129)
ALT SERPL-CCNC: 19 U/L (ref 10–40)
ANION GAP SERPL CALCULATED.3IONS-SCNC: 12 MMOL/L (ref 3–16)
AST SERPL-CCNC: 24 U/L (ref 15–37)
BASOPHILS ABSOLUTE: 0.1 K/UL (ref 0–0.2)
BASOPHILS RELATIVE PERCENT: 1 %
BILIRUB SERPL-MCNC: 1.2 MG/DL (ref 0–1)
BILIRUBIN URINE: NEGATIVE
BLOOD, URINE: NEGATIVE
BUN BLDV-MCNC: 11 MG/DL (ref 7–20)
CALCIUM SERPL-MCNC: 9.6 MG/DL (ref 8.3–10.6)
CHLORIDE BLD-SCNC: 99 MMOL/L (ref 99–110)
CLARITY: CLEAR
CO2: 26 MMOL/L (ref 21–32)
COLOR: YELLOW
CREAT SERPL-MCNC: 1 MG/DL (ref 0.6–1.2)
EKG ATRIAL RATE: 109 BPM
EKG DIAGNOSIS: NORMAL
EKG P AXIS: 70 DEGREES
EKG P-R INTERVAL: 140 MS
EKG Q-T INTERVAL: 348 MS
EKG QRS DURATION: 96 MS
EKG QTC CALCULATION (BAZETT): 468 MS
EKG R AXIS: 18 DEGREES
EKG T AXIS: 39 DEGREES
EKG VENTRICULAR RATE: 109 BPM
EOSINOPHILS ABSOLUTE: 0.1 K/UL (ref 0–0.6)
EOSINOPHILS RELATIVE PERCENT: 1 %
GFR SERPL CREATININE-BSD FRML MDRD: 56 ML/MIN/{1.73_M2}
GLUCOSE BLD-MCNC: 112 MG/DL (ref 70–99)
GLUCOSE URINE: NEGATIVE MG/DL
HCT VFR BLD CALC: 41.9 % (ref 36–48)
HEMOGLOBIN: 13.9 G/DL (ref 12–16)
INFLUENZA A: NOT DETECTED
INFLUENZA B: NOT DETECTED
KETONES, URINE: NEGATIVE MG/DL
LACTIC ACID: 1.1 MMOL/L (ref 0.4–2)
LEUKOCYTE ESTERASE, URINE: NEGATIVE
LYMPHOCYTES ABSOLUTE: 0.8 K/UL (ref 1–5.1)
LYMPHOCYTES RELATIVE PERCENT: 10.8 %
MAGNESIUM: 2.1 MG/DL (ref 1.8–2.4)
MCH RBC QN AUTO: 30.7 PG (ref 26–34)
MCHC RBC AUTO-ENTMCNC: 33.2 G/DL (ref 31–36)
MCV RBC AUTO: 92.6 FL (ref 80–100)
MICROSCOPIC EXAMINATION: NORMAL
MONOCYTES ABSOLUTE: 0.7 K/UL (ref 0–1.3)
MONOCYTES RELATIVE PERCENT: 9.6 %
NEUTROPHILS ABSOLUTE: 6 K/UL (ref 1.7–7.7)
NEUTROPHILS RELATIVE PERCENT: 77.6 %
NITRITE, URINE: NEGATIVE
PDW BLD-RTO: 13.3 % (ref 12.4–15.4)
PH UA: 6 (ref 5–8)
PLATELET # BLD: 244 K/UL (ref 135–450)
PMV BLD AUTO: 8.4 FL (ref 5–10.5)
POTASSIUM REFLEX MAGNESIUM: 3.5 MMOL/L (ref 3.5–5.1)
PROTEIN UA: NEGATIVE MG/DL
RBC # BLD: 4.53 M/UL (ref 4–5.2)
SARS-COV-2 RNA, RT PCR: NOT DETECTED
SODIUM BLD-SCNC: 137 MMOL/L (ref 136–145)
SPECIFIC GRAVITY UA: 1.01 (ref 1–1.03)
TOTAL PROTEIN: 7.8 G/DL (ref 6.4–8.2)
TROPONIN: <0.01 NG/ML
URINE REFLEX TO CULTURE: NORMAL
URINE TYPE: NORMAL
UROBILINOGEN, URINE: 0.2 E.U./DL
WBC # BLD: 7.8 K/UL (ref 4–11)

## 2023-01-20 PROCEDURE — 84484 ASSAY OF TROPONIN QUANT: CPT

## 2023-01-20 PROCEDURE — 96374 THER/PROPH/DIAG INJ IV PUSH: CPT

## 2023-01-20 PROCEDURE — 2700000000 HC OXYGEN THERAPY PER DAY

## 2023-01-20 PROCEDURE — 1123F ACP DISCUSS/DSCN MKR DOCD: CPT | Performed by: STUDENT IN AN ORGANIZED HEALTH CARE EDUCATION/TRAINING PROGRAM

## 2023-01-20 PROCEDURE — 87636 SARSCOV2 & INF A&B AMP PRB: CPT

## 2023-01-20 PROCEDURE — 81003 URINALYSIS AUTO W/O SCOPE: CPT

## 2023-01-20 PROCEDURE — 94761 N-INVAS EAR/PLS OXIMETRY MLT: CPT

## 2023-01-20 PROCEDURE — 83605 ASSAY OF LACTIC ACID: CPT

## 2023-01-20 PROCEDURE — 83735 ASSAY OF MAGNESIUM: CPT

## 2023-01-20 PROCEDURE — 1200000000 HC SEMI PRIVATE

## 2023-01-20 PROCEDURE — 93010 ELECTROCARDIOGRAM REPORT: CPT | Performed by: INTERNAL MEDICINE

## 2023-01-20 PROCEDURE — 2580000003 HC RX 258: Performed by: PHYSICIAN ASSISTANT

## 2023-01-20 PROCEDURE — 71045 X-RAY EXAM CHEST 1 VIEW: CPT

## 2023-01-20 PROCEDURE — 6370000000 HC RX 637 (ALT 250 FOR IP): Performed by: INTERNAL MEDICINE

## 2023-01-20 PROCEDURE — 99285 EMERGENCY DEPT VISIT HI MDM: CPT

## 2023-01-20 PROCEDURE — 93005 ELECTROCARDIOGRAM TRACING: CPT | Performed by: EMERGENCY MEDICINE

## 2023-01-20 PROCEDURE — 6360000002 HC RX W HCPCS: Performed by: PHYSICIAN ASSISTANT

## 2023-01-20 PROCEDURE — 80053 COMPREHEN METABOLIC PANEL: CPT

## 2023-01-20 PROCEDURE — 99215 OFFICE O/P EST HI 40 MIN: CPT | Performed by: STUDENT IN AN ORGANIZED HEALTH CARE EDUCATION/TRAINING PROGRAM

## 2023-01-20 PROCEDURE — 85025 COMPLETE CBC W/AUTO DIFF WBC: CPT

## 2023-01-20 RX ORDER — IPRATROPIUM BROMIDE AND ALBUTEROL SULFATE 2.5; .5 MG/3ML; MG/3ML
1 SOLUTION RESPIRATORY (INHALATION) EVERY 4 HOURS PRN
Status: DISCONTINUED | OUTPATIENT
Start: 2023-01-20 | End: 2023-01-22 | Stop reason: HOSPADM

## 2023-01-20 RX ORDER — ATORVASTATIN CALCIUM 10 MG/1
10 TABLET, FILM COATED ORAL DAILY
Status: DISCONTINUED | OUTPATIENT
Start: 2023-01-20 | End: 2023-01-22 | Stop reason: HOSPADM

## 2023-01-20 RX ORDER — ACETAMINOPHEN 325 MG/1
650 TABLET ORAL EVERY 6 HOURS PRN
Status: DISCONTINUED | OUTPATIENT
Start: 2023-01-20 | End: 2023-01-22 | Stop reason: HOSPADM

## 2023-01-20 RX ORDER — IPRATROPIUM BROMIDE AND ALBUTEROL SULFATE 2.5; .5 MG/3ML; MG/3ML
1 SOLUTION RESPIRATORY (INHALATION)
Status: DISCONTINUED | OUTPATIENT
Start: 2023-01-21 | End: 2023-01-20

## 2023-01-20 RX ORDER — DONEPEZIL HYDROCHLORIDE 10 MG/1
TABLET, FILM COATED ORAL
Qty: 90 TABLET | Refills: 3 | Status: SHIPPED | OUTPATIENT
Start: 2023-01-20

## 2023-01-20 RX ORDER — ACETAMINOPHEN 650 MG/1
650 SUPPOSITORY RECTAL EVERY 6 HOURS PRN
Status: DISCONTINUED | OUTPATIENT
Start: 2023-01-20 | End: 2023-01-22 | Stop reason: HOSPADM

## 2023-01-20 RX ORDER — DULOXETIN HYDROCHLORIDE 30 MG/1
CAPSULE, DELAYED RELEASE ORAL
Qty: 90 CAPSULE | Refills: 3 | Status: SHIPPED | OUTPATIENT
Start: 2023-01-20

## 2023-01-20 RX ORDER — ONDANSETRON 4 MG/1
4 TABLET, ORALLY DISINTEGRATING ORAL EVERY 8 HOURS PRN
Status: DISCONTINUED | OUTPATIENT
Start: 2023-01-20 | End: 2023-01-22 | Stop reason: HOSPADM

## 2023-01-20 RX ORDER — SODIUM CHLORIDE 9 MG/ML
INJECTION, SOLUTION INTRAVENOUS CONTINUOUS
Status: DISCONTINUED | OUTPATIENT
Start: 2023-01-20 | End: 2023-01-22 | Stop reason: HOSPADM

## 2023-01-20 RX ORDER — ONDANSETRON 2 MG/ML
4 INJECTION INTRAMUSCULAR; INTRAVENOUS EVERY 6 HOURS PRN
Status: DISCONTINUED | OUTPATIENT
Start: 2023-01-20 | End: 2023-01-22 | Stop reason: HOSPADM

## 2023-01-20 RX ORDER — SODIUM CHLORIDE 9 MG/ML
INJECTION, SOLUTION INTRAVENOUS PRN
Status: DISCONTINUED | OUTPATIENT
Start: 2023-01-20 | End: 2023-01-22 | Stop reason: HOSPADM

## 2023-01-20 RX ORDER — LOSARTAN POTASSIUM 100 MG/1
100 TABLET ORAL DAILY
Status: DISCONTINUED | OUTPATIENT
Start: 2023-01-20 | End: 2023-01-22 | Stop reason: HOSPADM

## 2023-01-20 RX ORDER — METOPROLOL SUCCINATE 25 MG/1
12.5 TABLET, EXTENDED RELEASE ORAL DAILY
Status: DISCONTINUED | OUTPATIENT
Start: 2023-01-20 | End: 2023-01-22 | Stop reason: HOSPADM

## 2023-01-20 RX ORDER — DULOXETIN HYDROCHLORIDE 30 MG/1
30 CAPSULE, DELAYED RELEASE ORAL DAILY
Status: DISCONTINUED | OUTPATIENT
Start: 2023-01-20 | End: 2023-01-22 | Stop reason: HOSPADM

## 2023-01-20 RX ORDER — POLYETHYLENE GLYCOL 3350 17 G/17G
17 POWDER, FOR SOLUTION ORAL DAILY PRN
Status: DISCONTINUED | OUTPATIENT
Start: 2023-01-20 | End: 2023-01-22 | Stop reason: HOSPADM

## 2023-01-20 RX ORDER — 0.9 % SODIUM CHLORIDE 0.9 %
1000 INTRAVENOUS SOLUTION INTRAVENOUS ONCE
Status: COMPLETED | OUTPATIENT
Start: 2023-01-20 | End: 2023-01-20

## 2023-01-20 RX ORDER — AZITHROMYCIN 250 MG/1
500 TABLET, FILM COATED ORAL EVERY 24 HOURS
Status: DISCONTINUED | OUTPATIENT
Start: 2023-01-21 | End: 2023-01-21

## 2023-01-20 RX ORDER — SODIUM CHLORIDE 0.9 % (FLUSH) 0.9 %
5-40 SYRINGE (ML) INJECTION EVERY 12 HOURS SCHEDULED
Status: DISCONTINUED | OUTPATIENT
Start: 2023-01-20 | End: 2023-01-22 | Stop reason: HOSPADM

## 2023-01-20 RX ORDER — SODIUM CHLORIDE 0.9 % (FLUSH) 0.9 %
5-40 SYRINGE (ML) INJECTION PRN
Status: DISCONTINUED | OUTPATIENT
Start: 2023-01-20 | End: 2023-01-22 | Stop reason: HOSPADM

## 2023-01-20 RX ORDER — DILTIAZEM HYDROCHLORIDE 180 MG/1
180 CAPSULE, COATED, EXTENDED RELEASE ORAL DAILY
Status: DISCONTINUED | OUTPATIENT
Start: 2023-01-20 | End: 2023-01-22 | Stop reason: HOSPADM

## 2023-01-20 RX ADMIN — SODIUM CHLORIDE 1000 ML: 9 INJECTION, SOLUTION INTRAVENOUS at 16:28

## 2023-01-20 RX ADMIN — AZITHROMYCIN MONOHYDRATE 500 MG: 500 INJECTION, POWDER, LYOPHILIZED, FOR SOLUTION INTRAVENOUS at 18:47

## 2023-01-20 RX ADMIN — ATORVASTATIN CALCIUM 10 MG: 10 TABLET, FILM COATED ORAL at 21:43

## 2023-01-20 RX ADMIN — CEFTRIAXONE SODIUM 1000 MG: 1 INJECTION, POWDER, FOR SOLUTION INTRAMUSCULAR; INTRAVENOUS at 18:13

## 2023-01-20 RX ADMIN — APIXABAN 5 MG: 5 TABLET, FILM COATED ORAL at 21:43

## 2023-01-20 SDOH — ECONOMIC STABILITY: FOOD INSECURITY: WITHIN THE PAST 12 MONTHS, YOU WORRIED THAT YOUR FOOD WOULD RUN OUT BEFORE YOU GOT MONEY TO BUY MORE.: NEVER TRUE

## 2023-01-20 SDOH — ECONOMIC STABILITY: FOOD INSECURITY: WITHIN THE PAST 12 MONTHS, THE FOOD YOU BOUGHT JUST DIDN'T LAST AND YOU DIDN'T HAVE MONEY TO GET MORE.: NEVER TRUE

## 2023-01-20 ASSESSMENT — PATIENT HEALTH QUESTIONNAIRE - PHQ9
SUM OF ALL RESPONSES TO PHQ QUESTIONS 1-9: 0
SUM OF ALL RESPONSES TO PHQ QUESTIONS 1-9: 0
2. FEELING DOWN, DEPRESSED OR HOPELESS: 0
SUM OF ALL RESPONSES TO PHQ QUESTIONS 1-9: 0
SUM OF ALL RESPONSES TO PHQ9 QUESTIONS 1 & 2: 0
1. LITTLE INTEREST OR PLEASURE IN DOING THINGS: 0
SUM OF ALL RESPONSES TO PHQ QUESTIONS 1-9: 0

## 2023-01-20 ASSESSMENT — SOCIAL DETERMINANTS OF HEALTH (SDOH): HOW HARD IS IT FOR YOU TO PAY FOR THE VERY BASICS LIKE FOOD, HOUSING, MEDICAL CARE, AND HEATING?: NOT HARD AT ALL

## 2023-01-20 ASSESSMENT — PAIN - FUNCTIONAL ASSESSMENT: PAIN_FUNCTIONAL_ASSESSMENT: NONE - DENIES PAIN

## 2023-01-20 NOTE — PROGRESS NOTES
Abiel Nova (:  1940) is a Established patient, here for evaluation of the following:    Assessment & Plan   Below is the assessment and plan developed based on review of pertinent history, physical exam, labs, studies, and medications. 1. Oxygen desaturation  2. Acute cough  Overall, patient is ill with clear signs of oxygen desaturation while at rest.  This is concerning because the patient most likely desaturates further upon ambulation, the patient does not typically get respiratory illnesses at baseline, does not have a history of asthma or COPD or other lung illness at baseline, has dementia and possibly aspiration, is elderly. Patient's symptoms may be related to COVID versus flu versus community-acquired pneumonia versus otherwise. Because of the patient's signs and symptoms, and limitations of virtual exam, advised daughter to take patient to the emergency department for further evaluation. Patient will likely need oxygen if her saturations remain that low. Patient's daughter verbalized understanding and that they will take her to the emergency department shortly. No follow-ups on file. Subjective   HPI  Patient is an 68-year-old female with a past medical history of atrial flutter, mitral valve stenosis with regurgitation, hypertension, who presents over A/V communication with her daughter for discussion on recent sick symptoms. Daughter reports that the patient became ill a couple of days ago. It started out with a cough and audible wheezing. Temperature has been varying by touch, most recent temperature is 100.2 °F.  Patient has a history of progressing dementia, and is not the best historian. Patient denies rhinorrhea, but daughter notes that the patient has had several tissues near her as a result of a runny nose and a productive cough that has had brown stained sputum. Patient reports no shortness of breath, chest pain, nausea, vomiting.   The daughter does note that the patient has a pulse oximeter at home and she has used it to measure the patient's oxygen saturation multiple times. Patient had an oxygen saturation of 88% last night, 89% this morning and again later today, followed by 92% most recently. All of these measurements were taken while at rest.  Patient has not been measured while ambulatory. Daughter does not believe that the patient is any more confused than usual.  No recent history of aspiration as far as patient can tell. Daughter is unsure if the patient has been breathing more rapidly than usual.  Patient is still making urine. Daughter confirms that the patient does not have any previous history of respiratory diseases, no asthma, no COPD, did not smoke, never gets sick. Review of Systems   All other systems reviewed and are negative. Objective   Patient-Reported Vitals  Patient-Reported Pulse: 78  Patient-Reported Pulse Oximetry: 92  Recent pulse oximetry 88 to 89%. All over last 24 hours, while at rest.       Physical Exam  Constitutional:       Comments: Patient is only intermittently alert, falling asleep during A/V communication. Cardiovascular:      Comments: Patient appears well perfused, but difficult to tell on video. Pulmonary:      Comments: Patient with hacking cough while on video. Asuncion Mckeon, was evaluated through a synchronous (real-time) audio-video encounter. The patient (or guardian if applicable) is aware that this is a billable service, which includes applicable co-pays. This Virtual Visit was conducted with patient's (and/or legal guardian's) consent. The visit was conducted pursuant to the emergency declaration under the [address] [place] [address] and the [employer] and [employer] General Act. Patient identification was verified, and a caregiver was present when appropriate.    The patient was located at Home: [address] [place] [address] Dr Yung Adler [id number]. Provider was located at [facility] (Appt Dept): [facility]         --Wayne Boyd MD

## 2023-01-20 NOTE — ED PROVIDER NOTES
[address] [facility]  ED  EMERGENCY DEPARTMENT ENCOUNTER        Pt Name: Bryant Larose  MRN: [id number]  [place] 1940  Date of evaluation: 1/20/2023  Provider: LIZETTE Leigh  PCP: Celinda Krabbe, MD  Note Started: 5:47 PM EST 1/20/23       I have seen and evaluated this patient with my supervising physician Cirilo Mckeon MD.      [address] [place] [address]       Chief Complaint   Patient presents with    Shortness of Breath     C/O shortness of breath, runny nose, cough, audible wheezing. States s/s began x 3 days ago. Worsening since. Daughter states pulse ox at home 88-89% RA. No home O2       HISTORY OF PRESENT ILLNESS: 1 or more Elements     History from : Patient        Bryant Larose is a 80 y.o. female who presents complaining of shortness of breath and low oxygen level at home. The patient does have history of dementia therefore history is primarily obtained from her daughter who lives with her. The patient apparently has been coughing for several days and complaining of shortness of breath. She has also had a runny nose and daughter has noticed audible wheezing. She states that pulse ox at home was reading 88 to 89%. She does not normally wear any oxygen. No one else has been sick around her. She has had a low-grade fever. She did give her Tylenol prior to arrival.  The patient states her cough is productive. No history of blood clots. She is anticoagulated. Nursing Notes were all reviewed and agreed with or any disagreements were addressed in the HPI. REVIEW OF SYSTEMS :      Review of Systems    Positives and Pertinent negatives as per HPI.      SURGICAL HISTORY     Past Surgical History:   Procedure Laterality Date    ABLATION OF DYSRHYTHMIC FOCUS  1/13/16    L-sided atypical A flutter, L-sided AT    CATARACT REMOVAL WITH IMPLANT Left 05/01/2018    COLONOSCOPY      EYE SURGERY Right 06/05/2018    phaco with IOL    KNEE SURGERY Left 2010    meniscus tear CURRENTMEDICATIONS       Previous Medications    ATORVASTATIN (LIPITOR) 10 MG TABLET    TAKE ONE TABLET BY MOUTH DAILY    CHOLECALCIFEROL (VITAMIN D3) 50 MCG ( UT) CAPS    Take by mouth    DILTIAZEM (TIAZAC) 180 MG EXTENDED RELEASE CAPSULE    TAKE ONE CAPSULE BY MOUTH DAILY    DONEPEZIL (ARICEPT) 10 MG TABLET    TAKE ONE TABLET BY MOUTH ONCE NIGHTLY    DULOXETINE (CYMBALTA) 30 MG EXTENDED RELEASE CAPSULE    TAKE ONE CAPSULE BY MOUTH DAILY    ELIQUIS 5 MG TABS TABLET    TAKE ONE TABLET BY MOUTH TWICE A DAY    LOSARTAN (COZAAR) 100 MG TABLET    TAKE ONE TABLET BY MOUTH DAILY    MEMANTINE (NAMENDA) 10 MG TABLET    Take 1 tablet by mouth 2 times daily    MEMANTINE (NAMENDA) 5 MG TABLET    1 po am and 2 po qhs x 2 wk, then 2 po bid    METOPROLOL SUCCINATE (TOPROL XL) 25 MG EXTENDED RELEASE TABLET    Take 1 tablet by mouth daily       ALLERGIES     Lisinopril    FAMILYHISTORY       Family History   Problem Relation Age of Onset    Other Mother         osteoporosis    Cancer Mother         ovarian, s/p oophorectomy/leukemia,  80    High Cholesterol Brother     Cancer Father         leukemia,  80    Stroke Brother         SOCIAL HISTORY       Social History     Tobacco Use    Smoking status: Never    Smokeless tobacco: Never   Vaping Use    Vaping Use: Never used   Substance Use Topics    Alcohol use: No     Alcohol/week: 0.0 - 0.8 standard drinks    Drug use: No       SCREENINGS        [place] Coma Scale  Eye Opening: Spontaneous  Best Verbal Response: Oriented  Best Motor Response: Obeys commands  Marie Coma Scale Score: 15                CIWA Assessment  BP: (!) 141/65  Heart Rate: 58           PHYSICAL EXAM  1 or more Elements     ED Triage Vitals [23 1605]   BP Temp Temp Source Heart Rate Resp SpO2 Height Weight   (!) 151/91 99.7 °F (37.6 °C) Oral (!) 117 18 93 % -- --       Physical Exam  Vitals and nursing note reviewed. Constitutional:       Appearance: Normal appearance.  She is not diaphoretic. HENT:      Head: Normocephalic and atraumatic. Nose: Nose normal.      Mouth/Throat:      Mouth: Mucous membranes are moist.   Eyes:      General:         Right eye: No discharge. Left eye: No discharge. Extraocular Movements: Extraocular movements intact. Pupils: Pupils are equal, round, and reactive to light. Cardiovascular:      Rate and Rhythm: Normal rate and regular rhythm. Pulses: Normal pulses. Heart sounds: Normal heart sounds. No murmur heard. No friction rub. No gallop. Pulmonary:      Effort: Pulmonary effort is normal. No respiratory distress. Breath sounds: No stridor. Examination of the right-lower field reveals wheezing. Examination of the left-lower field reveals wheezing. Decreased breath sounds and wheezing present. No rhonchi or rales. Abdominal:      General: Abdomen is flat. Palpations: Abdomen is soft. Tenderness: There is no abdominal tenderness. There is no guarding or rebound. Musculoskeletal:         General: Normal range of motion. Cervical back: Normal range of motion and neck supple. Right lower leg: No tenderness. No edema. Left lower leg: No tenderness. No edema. Skin:     General: Skin is warm and dry. Coloration: Skin is not pale. Neurological:      Mental Status: She is alert and oriented to person, place, and time.    Psychiatric:         Mood and Affect: Mood normal.         Behavior: Behavior normal.           DIAGNOSTIC RESULTS   LABS:    Labs Reviewed   CBC WITH AUTO DIFFERENTIAL - Abnormal; Notable for the following components:       Result Value    Lymphocytes Absolute 0.8 (*)     All other components within normal limits   COMPREHENSIVE METABOLIC PANEL W/ REFLEX TO MG FOR LOW K - Abnormal; Notable for the following components:    Glucose 112 (*)     Est, Glom Filt Rate 56 (*)     Albumin/Globulin Ratio 1.0 (*)     Total Bilirubin 1.2 (*)     All other components within normal limits   COVID-19 & INFLUENZA COMBO   TROPONIN   URINALYSIS WITH REFLEX TO CULTURE   LACTIC ACID   MAGNESIUM       When ordered only abnormal lab results are displayed. All other labs were within normal range or not returned as of this dictation. EKG: When ordered, EKG's are interpreted by the Emergency Department Physician in the absence of a cardiologist.  Please see their note for interpretation of EKG. RADIOLOGY:   Non-plain film images such as CT, Ultrasound and MRI are read by the radiologist. Plain radiographic images are visualized and preliminarily interpreted by the ED Provider with the below findings:    Chest x-ray concerning for possible pneumonia. Interpretation per the Radiologist below, if available at the time of this note:    XR CHEST PORTABLE   Final Result   Slightly increased infrahilar airspace opacities which may reflect pneumonia   in the appropriate clinical setting. Recommend follow-up to ensure complete   resolution. XR CHEST PORTABLE    Result Date: 1/20/2023  EXAMINATION: ONE XRAY VIEW OF THE CHEST 1/20/2023 4:30 pm COMPARISON: 01/16/2021 HISTORY: ORDERING SYSTEM PROVIDED HISTORY: sob TECHNOLOGIST PROVIDED HISTORY: Reason for exam:->sob Reason for Exam: SOB Initial encounter FINDINGS: Slightly increased infrahilar airspace opacities bilaterally. No other focal consolidation, pleural effusion or pneumothorax. The cardiomediastinal silhouette is stable. No overt pulmonary edema. The osseous structures are stable. Slightly increased infrahilar airspace opacities which may reflect pneumonia in the appropriate clinical setting. Recommend follow-up to ensure complete resolution. No results found. PROCEDURES   Unless otherwise noted below, none     Procedures    CRITICAL CARE TIME (.cctime)   There was a high probability of life-threatening clinical deterioration in the patient's condition requiring my urgent intervention.   I personally saw the patient and independently provided 32 minutes of non-concurrent critical care out of the total shared critical care time provided, excluding separately reportable procedures. PAST MEDICAL HISTORY      has a past medical history of Atrial flutter ([facility] [address] Atrial tachycardia ([address] CTS (carpal tunnel syndrome), HLD (hyperlipidemia), Hypertension, Mitral regurgitation (2016), Mixed hyperlipidemia (7/23/2018), Osteopenia (2013), Pneumonia due to group B Streptococcus, unspecified laterality, unspecified part of lung ([facility] [address] (1/20/2023), and Shingles (2012). Chronic Conditions affecting Care: Atrial flutter, mitral regurg, hypertension, hyperlipidemia    EMERGENCY DEPARTMENT COURSE and DIFFERENTIAL DIAGNOSIS/MDM:   Vitals:    Vitals:    01/20/23 1642 01/20/23 1700 01/20/23 1740 01/20/23 1810   BP:  (!) 144/78 (!) 140/74 (!) 141/65   Pulse: 67 66 59 58   Resp: 23 25 21 24   Temp:       TempSrc:       SpO2: 100% 99% 98% 97%       Patient was given the following medications:  Medications   azithromycin (ZITHROMAX) 500 mg in D5W 250ml addavial (500 mg IntraVENous New Bag 1/20/23 1847)   0.9 % sodium chloride bolus (0 mLs IntraVENous Stopped 1/20/23 1728)   cefTRIAXone (ROCEPHIN) 1,000 mg in dextrose 5 % 50 mL IVPB mini-bag (0 mg IntraVENous Stopped 1/20/23 1843)             Is this patient to be included in the SEP-1 Core Measure due to severe sepsis or septic shock? No   Exclusion criteria - the patient is NOT to be included for SEP-1 Core Measure due to:  May have criteria for sepsis, but does not meet criteria for severe sepsis or septic shock    CONSULTS: (Who and What was discussed)  None              CC/HPI Summary, DDx, ED Course, and Reassessment: Patient was evaluated in the emergency department today for shortness of breath and new oxygen requirement. The patient has low-grade fever of 99.7. She is requiring 2 L nasal cannula. Differential diagnosis includes pneumonia, COVID, flu, PE.     Work-up results include:  EKG interpreted by attending physician and have A. fib with RVR. Chest x-ray with slightly increased infrahilar airspace opacities which could reflect pneumonia. COVID and flu are negative. CBC without evidence of leukocytosis or acute anemia  CMP without acute electrolyte abnormality maintain renal function. Troponin is less than 0.01  Magnesium is 2.1  Urinalysis without evidence of infection. Lactic acid is 1.1    The patient was tachycardic and hypoxic upon arrival therefore she does have 2 SIRS criteria however does not meet criteria for sepsis. She was given IV fluids, IV Rocephin and IV azithromycin for pneumonia. Heart rate significantly improved to 58 bpm.  At this time I am recommending admission for pneumonia and acute respiratory failure with hypoxia new oxygen requirement. ,    Disposition Considerations (include 1 Tests not done, Shared Decision Making, Pt Expectation of Test or Tx.): Consider CT PE study however the patient responds well to fluids and does have evidence of pneumonia on chest x-ray therefore I do think that vitals and symptoms are most consistent with pneumonia and less consistent with PE.   We will treat for pneumonia and admit to hospitalist.      Results for orders placed or performed during the hospital encounter of 01/20/23   COVID-19 & Influenza Combo    Specimen: Nasopharyngeal Swab   Result Value Ref Range    SARS-CoV-2 RNA, RT PCR NOT DETECTED NOT DETECTED    INFLUENZA A NOT DETECTED NOT DETECTED    INFLUENZA B NOT DETECTED NOT DETECTED   CBC with Auto Differential   Result Value Ref Range    WBC 7.8 4.0 - 11.0 K/uL    RBC 4.53 4.00 - 5.20 M/uL    Hemoglobin 13.9 12.0 - 16.0 g/dL    Hematocrit 41.9 36.0 - 48.0 %    MCV 92.6 80.0 - 100.0 fL    MCH 30.7 26.0 - 34.0 pg    MCHC 33.2 31.0 - 36.0 g/dL    RDW 13.3 12.4 - 15.4 %    Platelets 572 603 - 642 K/uL    MPV 8.4 5.0 - 10.5 fL    Neutrophils % 77.6 %    Lymphocytes % 10.8 %    Monocytes % 9.6 %    Eosinophils % 1.0 %    Basophils % 1.0 %    Neutrophils Absolute 6.0 1.7 - 7.7 K/uL    Lymphocytes Absolute 0.8 (L) 1.0 - 5.1 K/uL    Monocytes Absolute 0.7 0.0 - 1.3 K/uL    Eosinophils Absolute 0.1 0.0 - 0.6 K/uL    Basophils Absolute 0.1 0.0 - 0.2 K/uL   CMP w/ Reflex to MG   Result Value Ref Range    Sodium 137 136 - 145 mmol/L    Potassium reflex Magnesium 3.5 3.5 - 5.1 mmol/L    Chloride 99 99 - 110 mmol/L    CO2 26 21 - 32 mmol/L    Anion Gap 12 3 - 16    Glucose 112 (H) 70 - 99 mg/dL    BUN 11 7 - 20 mg/dL    Creatinine 1.0 0.6 - 1.2 mg/dL    Est, Glom Filt Rate 56 (A) >60    Calcium 9.6 8.3 - 10.6 mg/dL    Total Protein 7.8 6.4 - 8.2 g/dL    Albumin 3.9 3.4 - 5.0 g/dL    Albumin/Globulin Ratio 1.0 (L) 1.1 - 2.2    Total Bilirubin 1.2 (H) 0.0 - 1.0 mg/dL    Alkaline Phosphatase 111 40 - 129 U/L    ALT 19 10 - 40 U/L    AST 24 15 - 37 U/L   Troponin   Result Value Ref Range    Troponin <0.01 <0.01 ng/mL   Urinalysis with Reflex to Culture    Specimen: Urine   Result Value Ref Range    Color, UA Yellow Straw/Yellow    Clarity, UA Clear Clear    Glucose, Ur Negative Negative mg/dL    Bilirubin Urine Negative Negative    Ketones, Urine Negative Negative mg/dL    Specific Gravity, UA 1.010 1.005 - 1.030    Blood, Urine Negative Negative    pH, UA 6.0 5.0 - 8.0    Protein, UA Negative Negative mg/dL    Urobilinogen, Urine 0.2 <2.0 E.U./dL    Nitrite, Urine Negative Negative    Leukocyte Esterase, Urine Negative Negative    Microscopic Examination Not Indicated     Urine Type NotGiven     Urine Reflex to Culture Not Indicated    Lactic Acid   Result Value Ref Range    Lactic Acid 1.1 0.4 - 2.0 mmol/L   Magnesium   Result Value Ref Range    Magnesium 2.10 1.80 - 2.40 mg/dL   EKG 12 Lead   Result Value Ref Range    Ventricular Rate 109 BPM    Atrial Rate 109 BPM    P-R Interval 140 ms    QRS Duration 96 ms    Q-T Interval 348 ms    QTc Calculation (Bazett) 468 ms    P Axis 70 degrees    R Axis 18 degrees    T Axis 39 degrees Diagnosis       Atrial fibrillation with rapid ventricular responseNonspecific ST and T wave abnormalityAbnormal ECGWhen compared with ECG of 16-JAN-2021 20:15,Vent. rate has increased BY  65 BPMST now depressed in Lateral leadsConfirmed by Bel Albert ([id number]) on 1/20/2023 6:33:55 PM         I spoke with Dr. DIETRICH'S [facility] hospitalist. We discussed the history, physical exam, diagnostics, as well as their emergency department course. Based upon that discussion, we've decided to admit Merline Pauling for further observation and evaluation of Sasha Pandey's   1. Pneumonia due to infectious organism, unspecified laterality, unspecified part of lung    2. Acute respiratory failure with hypoxia ([facility] [address]    . I am the Primary Clinician of Record. FINAL IMPRESSION      1. Pneumonia due to infectious organism, unspecified laterality, unspecified part of lung    2. Acute respiratory failure with hypoxia [facility])          DISPOSITION/PLAN     DISPOSITION Admitted 01/20/2023 06:18:33 PM      PATIENT REFERRED TO:  No follow-up provider specified.     DISCHARGE MEDICATIONS:  New Prescriptions    No medications on file       DISCONTINUED MEDICATIONS:  Discontinued Medications    No medications on file              (Please note that portions of this note were completed with a voice recognition program.  Efforts were made to edit the dictations but occasionally words are mis-transcribed.)    LIZETTE Sanches (electronically signed)            LIZETTE Sanches  01/20/23 1805

## 2023-01-20 NOTE — TELEPHONE ENCOUNTER
Kayden Barreto is requesting a rz for   donepezil (ARICEPT) 10 MG tablet    OV 9/14/22    Next office visit   2/15/2023

## 2023-01-21 PROBLEM — J18.9 PNA (PNEUMONIA): Status: ACTIVE | Noted: 2023-01-21

## 2023-01-21 LAB
ANION GAP SERPL CALCULATED.3IONS-SCNC: 11 MMOL/L (ref 3–16)
BASOPHILS ABSOLUTE: 0 K/UL (ref 0–0.2)
BASOPHILS RELATIVE PERCENT: 0.5 %
BUN BLDV-MCNC: 9 MG/DL (ref 7–20)
CALCIUM SERPL-MCNC: 8.7 MG/DL (ref 8.3–10.6)
CHLORIDE BLD-SCNC: 103 MMOL/L (ref 99–110)
CO2: 26 MMOL/L (ref 21–32)
CREAT SERPL-MCNC: 0.8 MG/DL (ref 0.6–1.2)
EOSINOPHILS ABSOLUTE: 0.1 K/UL (ref 0–0.6)
EOSINOPHILS RELATIVE PERCENT: 2.1 %
GFR SERPL CREATININE-BSD FRML MDRD: >60 ML/MIN/{1.73_M2}
GLUCOSE BLD-MCNC: 100 MG/DL (ref 70–99)
HCT VFR BLD CALC: 35.3 % (ref 36–48)
HEMOGLOBIN: 11.8 G/DL (ref 12–16)
LYMPHOCYTES ABSOLUTE: 0.9 K/UL (ref 1–5.1)
LYMPHOCYTES RELATIVE PERCENT: 15.7 %
MCH RBC QN AUTO: 31 PG (ref 26–34)
MCHC RBC AUTO-ENTMCNC: 33.4 G/DL (ref 31–36)
MCV RBC AUTO: 92.8 FL (ref 80–100)
MONOCYTES ABSOLUTE: 0.9 K/UL (ref 0–1.3)
MONOCYTES RELATIVE PERCENT: 15.2 %
NEUTROPHILS ABSOLUTE: 4 K/UL (ref 1.7–7.7)
NEUTROPHILS RELATIVE PERCENT: 66.5 %
PDW BLD-RTO: 13.3 % (ref 12.4–15.4)
PLATELET # BLD: 208 K/UL (ref 135–450)
PMV BLD AUTO: 8.1 FL (ref 5–10.5)
POTASSIUM REFLEX MAGNESIUM: 3.6 MMOL/L (ref 3.5–5.1)
RBC # BLD: 3.8 M/UL (ref 4–5.2)
SODIUM BLD-SCNC: 140 MMOL/L (ref 136–145)
WBC # BLD: 6 K/UL (ref 4–11)

## 2023-01-21 PROCEDURE — 2580000003 HC RX 258: Performed by: INTERNAL MEDICINE

## 2023-01-21 PROCEDURE — 1200000000 HC SEMI PRIVATE

## 2023-01-21 PROCEDURE — 6360000002 HC RX W HCPCS: Performed by: INTERNAL MEDICINE

## 2023-01-21 PROCEDURE — 80048 BASIC METABOLIC PNL TOTAL CA: CPT

## 2023-01-21 PROCEDURE — 36415 COLL VENOUS BLD VENIPUNCTURE: CPT

## 2023-01-21 PROCEDURE — 6370000000 HC RX 637 (ALT 250 FOR IP): Performed by: INTERNAL MEDICINE

## 2023-01-21 PROCEDURE — 85025 COMPLETE CBC W/AUTO DIFF WBC: CPT

## 2023-01-21 PROCEDURE — 94761 N-INVAS EAR/PLS OXIMETRY MLT: CPT

## 2023-01-21 PROCEDURE — 2700000000 HC OXYGEN THERAPY PER DAY

## 2023-01-21 RX ORDER — AZITHROMYCIN 250 MG/1
500 TABLET, FILM COATED ORAL EVERY 24 HOURS
Status: COMPLETED | OUTPATIENT
Start: 2023-01-22 | End: 2023-01-22

## 2023-01-21 RX ADMIN — METOPROLOL SUCCINATE 12.5 MG: 25 TABLET, EXTENDED RELEASE ORAL at 08:13

## 2023-01-21 RX ADMIN — APIXABAN 5 MG: 5 TABLET, FILM COATED ORAL at 20:40

## 2023-01-21 RX ADMIN — SODIUM CHLORIDE: 9 INJECTION, SOLUTION INTRAVENOUS at 10:18

## 2023-01-21 RX ADMIN — CEFTRIAXONE SODIUM 1000 MG: 1 INJECTION, POWDER, FOR SOLUTION INTRAMUSCULAR; INTRAVENOUS at 11:22

## 2023-01-21 RX ADMIN — DILTIAZEM HYDROCHLORIDE 180 MG: 180 CAPSULE, COATED, EXTENDED RELEASE ORAL at 08:13

## 2023-01-21 RX ADMIN — LOSARTAN POTASSIUM 100 MG: 100 TABLET, FILM COATED ORAL at 08:13

## 2023-01-21 RX ADMIN — DULOXETINE HYDROCHLORIDE 30 MG: 30 CAPSULE, DELAYED RELEASE ORAL at 08:13

## 2023-01-21 RX ADMIN — APIXABAN 5 MG: 5 TABLET, FILM COATED ORAL at 08:13

## 2023-01-21 RX ADMIN — AZITHROMYCIN MONOHYDRATE 500 MG: 250 TABLET ORAL at 08:13

## 2023-01-21 RX ADMIN — ATORVASTATIN CALCIUM 10 MG: 10 TABLET, FILM COATED ORAL at 08:13

## 2023-01-21 NOTE — PROGRESS NOTES
01/20/23 2033   RT Protocol   History Pulmonary Disease 0   Respiratory pattern 0   Breath sounds 2   Cough 0   Indications for Bronchodilator Therapy None   Bronchodilator Assessment Score 2

## 2023-01-21 NOTE — PLAN OF CARE
Problem: Discharge Planning  Goal: Discharge to home or other facility with appropriate resources  1/21/2023 1823 by Arcadio North RN  Outcome: Progressing  1/21/2023 1823 by Arcadio North RN  Outcome: Progressing     Problem: Safety - Adult  Goal: Free from fall injury  1/21/2023 1823 by Arcadio North RN  Outcome: Progressing  1/21/2023 1823 by Arcadio North RN  Outcome: Progressing

## 2023-01-21 NOTE — PROGRESS NOTES
Hospitalist Progress Note      PCP: Jelly Deal MD    Date of Admission: 1/20/2023    Chief Complaint: Shortness of breath and cough    Subjective: no new c/o. Medications:  Reviewed    Infusion Medications    sodium chloride      sodium chloride       Scheduled Medications    atorvastatin  10 mg Oral Daily    dilTIAZem  180 mg Oral Daily    DULoxetine  30 mg Oral Daily    apixaban  5 mg Oral BID    losartan  100 mg Oral Daily    metoprolol succinate  12.5 mg Oral Daily    sodium chloride flush  5-40 mL IntraVENous 2 times per day    cefTRIAXone (ROCEPHIN) IV  1,000 mg IntraVENous Q24H    And    azithromycin  500 mg Oral Q24H     PRN Meds: sodium chloride flush, sodium chloride, ondansetron **OR** ondansetron, polyethylene glycol, acetaminophen **OR** acetaminophen, ipratropium-albuterol    No intake or output data in the 24 hours ending 01/21/23 0906    Physical Exam Performed:    BP (!) 161/77   Pulse 59   Temp 98.8 °F (37.1 °C) (Oral)   Resp 18   Ht 5' 6\" (1.676 m)   Wt 156 lb (70.8 kg)   SpO2 95%   BMI 25.18 kg/m²     General appearance: No apparent distress, appears stated age and cooperative. HEENT: Pupils equal, round, and reactive to light. Conjunctivae/corneas clear. Neck: Supple, with full range of motion. No jugular venous distention. Trachea midline. Respiratory:  Normal respiratory effort. Clear to auscultation, bilaterally without Rales/Wheezes/Rhonchi. Cardiovascular: Regular rate and rhythm with normal S1/S2 without murmurs, rubs or gallops. Abdomen: Soft, non-tender, non-distended with normal bowel sounds. Musculoskeletal: No clubbing, cyanosis or edema bilaterally. Full range of motion without deformity. Skin: Skin color, texture, turgor normal.  No rashes or lesions. Neurologic:  Neurovascularly intact without any focal sensory/motor deficits.  Cranial nerves: II-XII intact, grossly non-focal.  Psychiatric: Alert and oriented, thought content appropriate, normal insight  Capillary Refill: Brisk,< 3 seconds   Peripheral Pulses: +2 palpable, equal bilaterally       Labs:   Recent Labs     01/20/23  1629 01/21/23  0535   WBC 7.8 6.0   HGB 13.9 11.8*   HCT 41.9 35.3*    208     Recent Labs     01/20/23  1629 01/21/23  0535    140   K 3.5 3.6   CL 99 103   CO2 26 26   BUN 11 9   CREATININE 1.0 0.8   CALCIUM 9.6 8.7     Recent Labs     01/20/23  1629   AST 24   ALT 19   BILITOT 1.2*   ALKPHOS 111     No results for input(s): INR in the last 72 hours. Recent Labs     01/20/23  1629   TROPONINI <0.01       Urinalysis:      Lab Results   Component Value Date/Time    NITRU Negative 01/20/2023 05:26 PM    BLOODU Negative 01/20/2023 05:26 PM    SPECGRAV 1.010 01/20/2023 05:26 PM    GLUCOSEU Negative 01/20/2023 05:26 PM       Consults:    None      Assessment/Plan:    Active Hospital Problems    Diagnosis     PNA (pneumonia) [J18.9]      Priority: Medium    Pneumonia due to group B Streptococcus, unspecified laterality, unspecified part of lung ([facility] [address] [J15.3]      Priority: Medium    Acute respiratory failure with hypoxia ([facility] [address] [J96.01]      Priority: Medium    Hypertension [I10]     Mixed hyperlipidemia [E78.2]        Acute respiratory failure with hypoxia in setting of pneumonia, SPO2 89% on room air now requiring 2 L nasal cannula  -chest x-ray revealed:Slightly increased infrahilar airspace opacities which may reflect pneumonia in the appropriate setting.  -Rapid COVID negative  -Influenza A and B negative  -1 L normal saline given in ED  -Azithromycin and ceftriaxone given in ED and continued 1/20/2023. Rocephin changed to DAILY. -MIVF  -As needed nebulizer and inhalers  -Oxygen therapy protocol  -Continuous pulse ox  -Incentive spirometer  -Encourage coughing and deep breathing     HTN - w/out known CAD and no evidence of active signs/sxs of ischemia/failure. Currently controlled on home meds w/ vitals reviewed and documented as above.     HyperLipidemia - controlled on home Statin. Continue, w/ f/u and med adjustment w/ PCP    Afib - chronic paroxysmal of unspecified and clinically unable to determine etiology. Normally rate controlled on BBlocker/CCBlocker - continued. Anticoagulated at baseline on home Eliquis due to secondary hypercoaguable state due to Afib - continued. Monitored on tele. Dementia - w/out behavioral disturbance. Controlled on home medication regimen - continued. Continue supportive care and redirection as needed. DVT Prophylaxis: Eliquis     Recent Labs     01/20/23  1629 01/21/23  0535    208     Diet: ADULT DIET; Regular  Code Status: Full Code      PT/OT Eval Status: not yet ordered. Dispo - Patient is likely to remain in-house at least until Sunday/Monday 22/23 Jan pending clinical course.   Daughter July Stratton present at bedside when seen 21 Huey Fuentes MD

## 2023-01-21 NOTE — H&P
Hospital Medicine History & Physical      PCP: Adeel Burris MD    Date of Admission: 1/20/2023    Date of Service: Pt seen/examined on 1/20/2023 and Admitted to Inpatient with expected LOS greater than two midnights due to medical therapy. Chief Complaint: Shortness of breath and cough    History Of Present Illness:      80 y.o. female, with past medical history of hypertension, hyperlipidemia, who presented to [facility] with shortness of breath and cough. History obtained from the patient, although confused at times and review of EMR. The patient stated she has been experiencing shortness of breath, cough and a runny nose for the last 2 to 3 days. However, she stated \"but now I feel okay and I did not realize I was sick\". Per EMR, the patient's daughter reported that the patient had an SPO2 of 88% on room air at home, which is why she brought her to the emergency room. The patient does not require supplemental oxygen. Daughter also reported that the patient had audible wheezing and low-grade fevers. She was given Tylenol prior to arriving at the emergency room. Upon arrival to the emergency room the patient was found to be hypoxic at 89% on room air. She was placed on 2 L nasal cannula. A chest x-ray was obtained that revealed slightly increased infrahilar airspace opacities which may reflect pneumonia in the appropriate setting. The patient's rapid COVID and influenza AMB were both negative. She was given 1 L of normal saline and started on azithromycin and ceftriaxone. The patient was admitted for further evaluation and treatment. She denied any other associated symptoms as well as any aggravating and/or alleviating factors. At the time of this assessment, the patient was resting comfortably in bed. She currently denies any chest pain, back pain, abdominal pain, shortness of breath, numbness, tingling, N/V/C/D, fever and/or chills.      Past Medical History:          Diagnosis Date Atrial flutter [facility])     s/p ablation    Atrial tachycardia (HCC)     s/p ablation    CTS (carpal tunnel syndrome)     left    HLD (hyperlipidemia)     Hypertension     Mitral regurgitation 2016    moderate, also with aortic sclerosis per echo    Mixed hyperlipidemia 7/23/2018    Osteopenia 2013    Pneumonia due to group B Streptococcus, unspecified laterality, unspecified part of lung ([address] 1/20/2023    Shingles 2012    Declines vaccine     Past Surgical History:          Procedure Laterality Date    ABLATION OF DYSRHYTHMIC FOCUS  1/13/16    L-sided atypical A flutter, L-sided AT    CATARACT REMOVAL WITH IMPLANT Left 05/01/2018    COLONOSCOPY      EYE SURGERY Right 06/05/2018    phaco with IOL    KNEE SURGERY Left 2010    meniscus tear     Medications Prior to Admission:      Prior to Admission medications    Medication Sig Start Date End Date Taking?  Authorizing Provider   DULoxetine (CYMBALTA) 30 MG extended release capsule TAKE ONE CAPSULE BY MOUTH DAILY 1/20/23   Vonda Santa DO   donepezil (ARICEPT) 10 MG tablet TAKE ONE TABLET BY MOUTH ONCE NIGHTLY 1/20/23   Vonda Santa DO   ELIQUIS 5 MG TABS tablet TAKE ONE TABLET BY MOUTH TWICE A DAY 1/11/23   Alexandra Myers MD   dilTIAZem (TIAZAC) 180 MG extended release capsule TAKE ONE CAPSULE BY MOUTH DAILY 1/2/23   NICKY Delgado CNP   losartan (COZAAR) 100 MG tablet TAKE ONE TABLET BY MOUTH DAILY 11/10/22   NICKY Jospeh Asa, CNP   memantine (NAMENDA) 5 MG tablet 1 po am and 2 po qhs x 2 wk, then 2 po bid 9/14/22   Alexandra Myers MD   memantine (NAMENDA) 10 MG tablet Take 1 tablet by mouth 2 times daily 9/14/22   Alexandra Myers MD   metoprolol succinate (TOPROL XL) 25 MG extended release tablet Take 1 tablet by mouth daily  Patient taking differently: Take 12.5 mg by mouth daily 4/22/22   Angel Chavis MD   atorvastatin (LIPITOR) 10 MG tablet TAKE ONE TABLET BY MOUTH DAILY 1/5/22   Alexandra Myers MD   Cholecalciferol (VITAMIN D3) 50 MCG (2000 UT) CAPS Take by mouth    Historical Provider, MD     Allergies:  Lisinopril    Social History:      The patient currently lives at home    TOBACCO:   reports that she has never smoked. She has never used smokeless tobacco.  ETOH:   reports no history of alcohol use. E-cigarette/Vaping       Questions Responses    E-cigarette/Vaping Use Never User    Start Date     Passive Exposure     Quit Date     Counseling Given     Comments Unknown          Family History:      Reviewed and negative in regards to presenting illness/complaint. Problem Relation Age of Onset    Other Mother         osteoporosis    Cancer Mother         ovarian, s/p oophorectomy/leukemia,  80    High Cholesterol Brother     Cancer Father         leukemia,  80    Stroke Brother      REVIEW OF SYSTEMS COMPLETED:   Pertinent positives as noted in the HPI. All other systems reviewed and negative. PHYSICAL EXAM PERFORMED:    /68   Pulse 56   Temp 98.9 °F (37.2 °C) (Oral)   Resp 19   SpO2 98%     General appearance: Pleasant, elderly female in no apparent distress, appears stated age and cooperative. HEENT:  Pupils equal, round, and reactive to light. Extra ocular muscles intact. Conjunctivae/corneas clear. Neck: Supple, with full range of motion. No jugular venous distention. Trachea midline. Respiratory:  Normal respiratory effort. Expiratory wheeze bilaterally throughout, 2 L nasal cannula  Cardiovascular:  Regular rate and rhythm with normal S1/S2 without murmurs, rubs or gallops. Abdomen: Soft, non-tender, non-distended with normal bowel sounds. Musculoskeletal:  No clubbing, cyanosis or edema bilaterally. Full range of motion without deformity. Skin: Skin color, texture, turgor normal.  No significant rashes or lesions. Neurologic:  Neurovascularly intact.  Cranial nerves: II-XII intact, grossly non-focal.  Psychiatric:  Alert and oriented, thought content appropriate, normal insight  Capillary Refill: Brisk,3 seconds, normal  Peripheral Pulses: +2 palpable, equal bilaterally     Labs:     Recent Labs     01/20/23  1629   WBC 7.8   HGB 13.9   HCT 41.9        Recent Labs     01/20/23  1629      K 3.5   CL 99   CO2 26   BUN 11   CREATININE 1.0   CALCIUM 9.6     Recent Labs     01/20/23  1629   AST 24   ALT 19   BILITOT 1.2*   ALKPHOS 111     Recent Labs     01/20/23  1629   TROPONINI <0.01     Urinalysis:      Lab Results   Component Value Date/Time    NITRU Negative 01/20/2023 05:26 PM    BLOODU Negative 01/20/2023 05:26 PM    SPECGRAV 1.010 01/20/2023 05:26 PM    GLUCOSEU Negative 01/20/2023 05:26 PM     Radiology:     CXR: I have reviewed the CXR with the following interpretation: Slightly increased infrahilar airspace opacities which may reflect pneumonia in the appropriate setting. EKG:  I have reviewed the EKG with the following interpretation: Atrial fibrillation with rapid ventricular response. Nonspecific ST and T wave abnormality. Abnormal ECG. When compared with ECG of 16-JAN-2021 20:15,Vent. rate has increased BY  65 BPM. ST now depressed in Lateral leads. Confirmed by Ray Blackmon ([id number]) on 1/20/2023 6:33:55 PM     XR CHEST PORTABLE   Final Result   Slightly increased infrahilar airspace opacities which may reflect pneumonia   in the appropriate clinical setting. Recommend follow-up to ensure complete   resolution.            Consults:    None    ASSESSMENT:    Active Hospital Problems    Diagnosis Date Noted    Pneumonia due to group B Streptococcus, unspecified laterality, unspecified part of lung ([facility] [J15.3] 01/20/2023     Priority: Medium    Acute respiratory failure with hypoxia ([address] [J96.01] 01/20/2023     Priority: Medium    Hypertension [I10]     Mixed hyperlipidemia [E78.2] 07/23/2018     PLAN:    Acute respiratory failure with hypoxia in setting of pneumonia, SPO2 89% on room air now requiring 2 L nasal cannula  -chest x-ray revealed:Slightly increased infrahilar airspace opacities which may reflect pneumonia in the appropriate setting.  -Rapid COVID negative  -Influenza A and B negative  -1 L normal saline given in ED  -Azithromycin and ceftriaxone given in ED and continued 1/20/2023  -MIVF  -As needed nebulizer and inhalers  -Oxygen therapy protocol  -Continuous pulse ox  -Incentive spirometer  -Encourage coughing and deep breathing    Essential hypertension  -Continue losartan and metoprolol    Hyperlipidemia  -Continue atorvastatin    Atrial fibrillation  -Continue diltiazem  -Anticoagulated on Eliquis  -Continue beta-blocker    DVT Prophylaxis: Eliquis    Diet: ADULT DIET; Regular; 3 carb choices (45 gm/meal)    Code Status: Full Code    PT/OT Eval Status: No indication for need at this time    Dispo -2-3 days pending clinical improvement     NICKY Maloney - ALTAF    Thank you Williams Bain MD for the opportunity to be involved in this patient's care.  If you have any questions or concerns please feel free to contact me at [phone].  ----------------------------------Dr. Chino Robles------------------------------------------

## 2023-01-21 NOTE — ED PROVIDER NOTES
Emergency Department Attending Provider Note  Location: [facility]  ED  1/20/2023   Note Started: 7:16 PM EST 1/20/23       Patient Identification  Fran Ellis is a 80 y.o. female      Carolina Levy was evaluated in the Emergency Department for shortness of breath. Patient does not typically require supplemental oxygen. She has had progressive symptoms over the last several days. Patient notes cough that is mostly nonproductive. No fevers, chills, or sweats. No lower extremity swelling or edema. No chest pain. . Although initial history and physical exam information was obtained by HAYDEN/NPP/MD/ (who also dictated a record of this visit), I personally saw the patient and performed a substantive portion of the visit including all aspects of the medical decision making. PHYSICAL EXAM:  General: Moderate discomfort. Head: Normocephalic/atraumatic. Heart: Mild tachycardia. Normal S1-S2. Lungs: Clear to auscultation bilaterally. No wheezes, rales, rhonchi. Mild tachypnea noted. Abdomen: Soft. Extremities: No swelling or edema. EKG Interpretation  Sinus tachycardia with a rate of 109. Normal axis. . Otherwise normal intervals and durations. LVH with nonspecific ST and T wave changes noted. Sinus tachycardia appears to be new when compared to previous EKG on 7/19/2022. Patient seen and evaluated. Relevant records reviewed. MDM: Patient presents to the emergency department with respiratory failure requiring supplemental oxygen. Vitals reveal mild tachycardia as well. Labs including cardiac enzymes and lactic acid are stable. Normal white cell count. Chest x-ray is concerning for pneumonia. COVID and flu are negative. I estimate the patient is at low risk of CHF exacerbation, ACS, or sepsis. CLINICAL IMPRESSION  1. Pneumonia due to infectious organism, unspecified laterality, unspecified part of lung    2.  Acute respiratory failure with hypoxia ([address] Manish Hernandez DO, am the primary clinician of record. I personally saw the patient and independently provided 35   minutes of non-concurrent critical care out of the total shared critical care time provided. This chart was generated in part by using Dragon Dictation system and may contain errors related to that system including errors in grammar, punctuation, and spelling, as well as words and phrases that may be inappropriate. If there are any questions or concerns please feel free to contact the dictating provider for clarification.      Herminia Viera DO   [employer]       Herminia Viera DO  01/20/23 1918

## 2023-01-22 VITALS
WEIGHT: 156 LBS | SYSTOLIC BLOOD PRESSURE: 154 MMHG | DIASTOLIC BLOOD PRESSURE: 74 MMHG | HEIGHT: 66 IN | TEMPERATURE: 98.1 F | OXYGEN SATURATION: 96 % | BODY MASS INDEX: 25.07 KG/M2 | HEART RATE: 64 BPM | RESPIRATION RATE: 16 BRPM

## 2023-01-22 LAB
ALBUMIN SERPL-MCNC: 3 G/DL (ref 3.4–5)
ANION GAP SERPL CALCULATED.3IONS-SCNC: 8 MMOL/L (ref 3–16)
BUN BLDV-MCNC: 12 MG/DL (ref 7–20)
CALCIUM SERPL-MCNC: 8.9 MG/DL (ref 8.3–10.6)
CHLORIDE BLD-SCNC: 106 MMOL/L (ref 99–110)
CO2: 27 MMOL/L (ref 21–32)
CREAT SERPL-MCNC: 1.1 MG/DL (ref 0.6–1.2)
GFR SERPL CREATININE-BSD FRML MDRD: 50 ML/MIN/{1.73_M2}
GLUCOSE BLD-MCNC: 101 MG/DL (ref 70–99)
HCT VFR BLD CALC: 32.7 % (ref 36–48)
HEMOGLOBIN: 10.7 G/DL (ref 12–16)
MCH RBC QN AUTO: 30.5 PG (ref 26–34)
MCHC RBC AUTO-ENTMCNC: 32.8 G/DL (ref 31–36)
MCV RBC AUTO: 92.9 FL (ref 80–100)
PDW BLD-RTO: 13.1 % (ref 12.4–15.4)
PHOSPHORUS: 3.6 MG/DL (ref 2.5–4.9)
PLATELET # BLD: 200 K/UL (ref 135–450)
PMV BLD AUTO: 8.1 FL (ref 5–10.5)
POTASSIUM SERPL-SCNC: 3.9 MMOL/L (ref 3.5–5.1)
RBC # BLD: 3.52 M/UL (ref 4–5.2)
SODIUM BLD-SCNC: 141 MMOL/L (ref 136–145)
WBC # BLD: 4.2 K/UL (ref 4–11)

## 2023-01-22 PROCEDURE — 6360000002 HC RX W HCPCS: Performed by: INTERNAL MEDICINE

## 2023-01-22 PROCEDURE — 36415 COLL VENOUS BLD VENIPUNCTURE: CPT

## 2023-01-22 PROCEDURE — 80069 RENAL FUNCTION PANEL: CPT

## 2023-01-22 PROCEDURE — 2580000003 HC RX 258: Performed by: INTERNAL MEDICINE

## 2023-01-22 PROCEDURE — 6370000000 HC RX 637 (ALT 250 FOR IP): Performed by: INTERNAL MEDICINE

## 2023-01-22 PROCEDURE — 85027 COMPLETE CBC AUTOMATED: CPT

## 2023-01-22 RX ORDER — CEFUROXIME AXETIL 500 MG/1
500 TABLET ORAL 2 TIMES DAILY
Qty: 10 TABLET | Refills: 0 | Status: SHIPPED | OUTPATIENT
Start: 2023-01-22 | End: 2023-01-27

## 2023-01-22 RX ORDER — LOPERAMIDE HYDROCHLORIDE 2 MG/1
2 CAPSULE ORAL 4 TIMES DAILY PRN
Status: DISCONTINUED | OUTPATIENT
Start: 2023-01-22 | End: 2023-01-22 | Stop reason: HOSPADM

## 2023-01-22 RX ADMIN — APIXABAN 5 MG: 5 TABLET, FILM COATED ORAL at 08:00

## 2023-01-22 RX ADMIN — ATORVASTATIN CALCIUM 10 MG: 10 TABLET, FILM COATED ORAL at 07:59

## 2023-01-22 RX ADMIN — METOPROLOL SUCCINATE 12.5 MG: 25 TABLET, EXTENDED RELEASE ORAL at 07:59

## 2023-01-22 RX ADMIN — DILTIAZEM HYDROCHLORIDE 180 MG: 180 CAPSULE, COATED, EXTENDED RELEASE ORAL at 07:59

## 2023-01-22 RX ADMIN — AZITHROMYCIN MONOHYDRATE 500 MG: 250 TABLET ORAL at 08:00

## 2023-01-22 RX ADMIN — DULOXETINE HYDROCHLORIDE 30 MG: 30 CAPSULE, DELAYED RELEASE ORAL at 07:59

## 2023-01-22 RX ADMIN — CEFTRIAXONE SODIUM 1000 MG: 1 INJECTION, POWDER, FOR SOLUTION INTRAMUSCULAR; INTRAVENOUS at 08:06

## 2023-01-22 RX ADMIN — LOSARTAN POTASSIUM 100 MG: 100 TABLET, FILM COATED ORAL at 07:59

## 2023-01-22 NOTE — CARE COORDINATION
CASE MANAGEMENT INITIAL ASSESSMENT    Reviewed chart and completed assessment with patient's daughter via hospital room phone, as pt was unavailable  Family present:  yes  Explained Case Management role/services. yes    Primary contact information: daughters listed below    Health Care Decision Maker :   Primary Decision Maker: Pieter Chu ([facility]) - Child - [phone]    Secondary Decision Maker: Tiago Yu ([facility]) - Child - [phone]        Admit date/status: IPA 1/20/23  Diagnosis: pneumonia     Is this a Readmission?:  No    Readmission Screening completed?: No     Insurance: BCBS Medicare     Precert required for SNF: Yes       3 night stay required: No    Living arrangements, Adls, care needs, prior to admission: IPTA. Resides with daughter, Dandy Cohen. Rarely drives    Durable Medical Equipment at home: none - although she has a \"walking stick\" available to her. But, she does not routinely use. Services in the home and/or outpatient, prior to admission:none    Current PCP: Tawana Grove MD            Medications: Prescription coverage? Yes Will pt require financial assistance with medications No     Transportation needs: family can provide     PT/OT recs: not completed at this time    [address] Notification (HEN): not started    Barriers to discharge: none    Plan/comments: at this time, daughter anticipates pt will discharge home without needs. Pt may benefit from PT/OT evaluation prior to discharge. Pt on 1.5 liters oxygen. None at baseline. CM team will follow for needs.      Oli Sepulveda RN

## 2023-01-22 NOTE — PROGRESS NOTES
Hospitalist Progress Note      PCP: Fredi Potts MD    Date of Admission: 1/20/2023    Chief Complaint: Shortness of breath and cough    Subjective: no new c/o. Medications:  Reviewed    Infusion Medications    sodium chloride 75 mL/hr at 01/21/23 1018    sodium chloride       Scheduled Medications    cefTRIAXone (ROCEPHIN) IV  1,000 mg IntraVENous Daily    atorvastatin  10 mg Oral Daily    dilTIAZem  180 mg Oral Daily    DULoxetine  30 mg Oral Daily    apixaban  5 mg Oral BID    losartan  100 mg Oral Daily    metoprolol succinate  12.5 mg Oral Daily    sodium chloride flush  5-40 mL IntraVENous 2 times per day     PRN Meds: sodium chloride flush, sodium chloride, ondansetron **OR** ondansetron, polyethylene glycol, acetaminophen **OR** acetaminophen, ipratropium-albuterol    No intake or output data in the 24 hours ending 01/22/23 0841    Physical Exam Performed:    BP (!) 154/74   Pulse 64   Temp 98.1 °F (36.7 °C)   Resp 16   Ht 5' 6\" (1.676 m)   Wt 156 lb (70.8 kg)   SpO2 96%   BMI 25.18 kg/m²     General appearance: No apparent distress, appears stated age and cooperative. HEENT: Pupils equal, round, and reactive to light. Conjunctivae/corneas clear. Neck: Supple, with full range of motion. No jugular venous distention. Trachea midline. Respiratory:  Normal respiratory effort. Clear to auscultation, bilaterally without Rales/Wheezes/Rhonchi. Cardiovascular: Regular rate and rhythm with normal S1/S2 without murmurs, rubs or gallops. Abdomen: Soft, non-tender, non-distended with normal bowel sounds. Musculoskeletal: No clubbing, cyanosis or edema bilaterally. Full range of motion without deformity. Skin: Skin color, texture, turgor normal.  No rashes or lesions. Neurologic:  Neurovascularly intact without any focal sensory/motor deficits.  Cranial nerves: II-XII intact, grossly non-focal.  Psychiatric: Alert and oriented, thought content appropriate, normal insight  Capillary Refill: Brisk,< 3 seconds   Peripheral Pulses: +2 palpable, equal bilaterally       Labs:   Recent Labs     01/20/23  1629 01/21/23  0535 01/22/23  0525   WBC 7.8 6.0 4.2   HGB 13.9 11.8* 10.7*   HCT 41.9 35.3* 32.7*    208 200       Recent Labs     01/20/23  1629 01/21/23  0535 01/22/23  0525    140 141   K 3.5 3.6 3.9   CL 99 103 106   CO2 26 26 27   BUN 11 9 12   CREATININE 1.0 0.8 1.1   CALCIUM 9.6 8.7 8.9   PHOS  --   --  3.6       Recent Labs     01/20/23  1629   AST 24   ALT 19   BILITOT 1.2*   ALKPHOS 111       No results for input(s): INR in the last 72 hours. Recent Labs     01/20/23  1629   TROPONINI <0.01         Urinalysis:      Lab Results   Component Value Date/Time    NITRU Negative 01/20/2023 05:26 PM    BLOODU Negative 01/20/2023 05:26 PM    SPECGRAV 1.010 01/20/2023 05:26 PM    GLUCOSEU Negative 01/20/2023 05:26 PM       Consults:    None      Assessment/Plan:    Active Hospital Problems    Diagnosis     PNA (pneumonia) [J18.9]      Priority: Medium    Pneumonia due to group B Streptococcus, unspecified laterality, unspecified part of lung ([facility] [address] [J15.3]      Priority: Medium    Acute respiratory failure with hypoxia ([facility] [address] [J96.01]      Priority: Medium    Hypertension [I10]     Mixed hyperlipidemia [E78.2]        PNA - likely CAP w/ Strep Pneumonia. CXR w/ infrahilar ASD. Started on empiric Rocephin/Azithro on/before 20 Jan.  Changed to DAILY dosing. Rapid COVID negative. Influenza A and B negative. Acute Respiratory Failure - w/ hypoxia, POArrival.  Presence of clinical respiratory distress w/ tachypnea/dyspnea/SOB and wheezing w/ use of accessory muscles to breath - improved w/ current tx. Supplemental O2 and wean as tolerated. HTN - w/out known CAD and no evidence of active signs/sxs of ischemia/failure. Currently controlled on home meds w/ vitals reviewed and documented as above. HyperLipidemia - controlled on home Statin.  Continue, w/ f/u and med adjustment w/ PCP    Afib - chronic paroxysmal of unspecified and clinically unable to determine etiology. Normally rate controlled on BBlocker/CCBlocker - continued. Anticoagulated at baseline on home Eliquis due to secondary hypercoaguable state due to Afib - continued. Monitored on tele. Anemia - etiology clinically unable to determine, w/out evidence of active bleeding/hemolysis. Clinically stable and asymptomatic w/out indication for transfusion. Follow serial labs. Reviewed and documented as above. Dementia - w/out behavioral disturbance. Controlled on home medication regimen - continued. Continue supportive care and redirection as needed. DVT Prophylaxis: Eliquis     Recent Labs     01/20/23  1629 01/21/23  0535 01/22/23  0525    208 200       Diet: ADULT DIET; Regular  Code Status: Full Code      PT/OT Eval Status: not yet ordered. Dispo - Patient is likely to remain in-house at least until Sunday/Monday 22/23 Jan pending clinical course.   Daughter Porsha Gonzalez present at bedside when seen 21 Marie Enamorado MD

## 2023-01-22 NOTE — PROGRESS NOTES
Patient and family given discharge instructions. All questions and concerns were addressed. IV removed per protocol. Patient wheeled to car with all patient belongings and given paper  prescription.

## 2023-01-22 NOTE — CARE COORDINATION
CM noted discharge order and spoke with RN. Patient now on room air and no need for home oxygen. Patient and daughter have no further needs.

## 2023-01-24 NOTE — DISCHARGE SUMMARY
Hospital Medicine Discharge Summary    Patient ID: Ferny Gayle      Patient's PCP: Efren Geiger MD    Admit Date: 1/20/2023     Discharge Date: 1/22/2023      Admitting Physician: Carroll Teixeira MD     Discharge Physician: Mono Glez MD     Discharge Diagnoses: Active Hospital Problems    Diagnosis     PNA (pneumonia) [J18.9]      Priority: Medium    Pneumonia due to group B Streptococcus, unspecified laterality, unspecified part of lung ([address] [J15.3]      Priority: Medium    Acute respiratory failure with hypoxia (HCC) [J96.01]      Priority: Medium    Hypertension [I10]     Mixed hyperlipidemia [E78.2]        The patient was seen and examined on day of discharge and this discharge summary is in conjunction with any daily progress note from day of discharge. Hospital Course:       PNA - likely CAP w/ Strep Pneumonia. CXR w/ infrahilar ASD. Started on empiric Rocephin/Azithro on/before 20 Jan.  Changed to DAILY dosing. Rapid COVID negative. Influenza A and B negative. Acute Respiratory Failure - w/ hypoxia, POArrival.  Presence of clinical respiratory distress w/ tachypnea/dyspnea/SOB and wheezing w/ use of accessory muscles to breath - improved w/ current tx. Supplemental O2 and wean as tolerated. HTN - w/out known CAD and no evidence of active signs/sxs of ischemia/failure. Currently controlled on home meds      HyperLipidemia - controlled on home Statin. Continue, w/ f/u and med adjustment w/ PCP     Afib - chronic paroxysmal of unspecified and clinically unable to determine etiology. Normally rate controlled on BBlocker/CCBlocker - continued. Anticoagulated at baseline on home Eliquis due to secondary hypercoaguable state due to Afib - continued. Anemia - etiology clinically unable to determine, w/out evidence of active bleeding/hemolysis. Clinically stable and asymptomatic w/out indication for transfusion. Dementia - w/out behavioral disturbance. Controlled on home medication regimen - continued. Continue supportive care and redirection as needed. Labs: For convenience and continuity at follow-up the following most recent labs are provided:      CBC:    Lab Results   Component Value Date/Time    WBC 4.2 01/22/2023 05:25 AM    HGB 10.7 01/22/2023 05:25 AM    HCT 32.7 01/22/2023 05:25 AM     01/22/2023 05:25 AM       Renal:    Lab Results   Component Value Date/Time     01/22/2023 05:25 AM    K 3.9 01/22/2023 05:25 AM    K 3.6 01/21/2023 05:35 AM     01/22/2023 05:25 AM    CO2 27 01/22/2023 05:25 AM    BUN 12 01/22/2023 05:25 AM    CREATININE 1.1 01/22/2023 05:25 AM    CALCIUM 8.9 01/22/2023 05:25 AM    PHOS 3.6 01/22/2023 05:25 AM         Significant Diagnostic Studies    Radiology:   XR CHEST PORTABLE   Final Result   Slightly increased infrahilar airspace opacities which may reflect pneumonia   in the appropriate clinical setting. Recommend follow-up to ensure complete   resolution. Consults:     None    Disposition: Home     Condition at Discharge: Stable    Discharge Instructions/Follow-up:  w/ PCP 1-2 weeks and subspecialists as arranged.      Code Status:  Full Code    Activity: activity as tolerated    Diet: regular diet      Discharge Medications:     Discharge Medication List as of 1/22/2023 11:39 AM             Details   cefUROXime (CEFTIN) 500 MG tablet Take 1 tablet by mouth 2 times daily for 5 days, Disp-10 tablet, R-0Print                Details   DULoxetine (CYMBALTA) 30 MG extended release capsule TAKE ONE CAPSULE BY MOUTH DAILY, Disp-90 capsule, R-3Normal      donepezil (ARICEPT) 10 MG tablet TAKE ONE TABLET BY MOUTH ONCE NIGHTLY, Disp-90 tablet, R-3Normal      ELIQUIS 5 MG TABS tablet TAKE ONE TABLET BY MOUTH TWICE A DAY, Disp-180 tablet, R-3Normal      dilTIAZem (TIAZAC) 180 MG extended release capsule TAKE ONE CAPSULE BY MOUTH DAILY, Disp-90 capsule, R-3Normal      losartan (COZAAR) 100 MG tablet TAKE ONE TABLET BY MOUTH DAILY, Disp-90 tablet, R-3Normal      !! memantine (NAMENDA) 5 MG tablet 1 po am and 2 po qhs x 2 wk, then 2 po bid, Disp-98 tablet, R-0Normal      !! memantine (NAMENDA) 10 MG tablet Take 1 tablet by mouth 2 times daily, Disp-180 tablet, R-3Print      metoprolol succinate (TOPROL XL) 25 MG extended release tablet Take 1 tablet by mouth daily, Disp-180 tablet, R-3Normal      atorvastatin (LIPITOR) 10 MG tablet TAKE ONE TABLET BY MOUTH DAILY, Disp-90 tablet, R-3Normal      Cholecalciferol (VITAMIN D3) 50 MCG (2000 UT) CAPS Take by mouthHistorical Med       !! - Potential duplicate medications found. Please discuss with provider. Time Spent on discharge is more than 36 minutes in the examination, evaluation, counseling and review of medications and discharge plan. Signed:    Holly Stewart MD   1/24/2023      Thank you Blayne Brewer MD for the opportunity to be involved in this patient's care. If you have any questions or concerns please feel free to contact me at [phone].

## 2023-02-09 ENCOUNTER — OFFICE VISIT (OUTPATIENT)
Dept: CARDIOLOGY CLINIC | Age: 83
End: 2023-02-09

## 2023-02-09 VITALS
HEART RATE: 55 BPM | OXYGEN SATURATION: 95 % | WEIGHT: 164 LBS | HEIGHT: 66 IN | BODY MASS INDEX: 26.36 KG/M2 | DIASTOLIC BLOOD PRESSURE: 70 MMHG | SYSTOLIC BLOOD PRESSURE: 130 MMHG

## 2023-02-09 DIAGNOSIS — I48.0 PAF (PAROXYSMAL ATRIAL FIBRILLATION) (HCC): ICD-10-CM

## 2023-02-09 DIAGNOSIS — I48.4 ATYPICAL ATRIAL FLUTTER (HCC): Primary | ICD-10-CM

## 2023-02-09 NOTE — PROGRESS NOTES
[address]   Electrophysiology Outpatient Note              Date:  February 9, 2023  Patient name: Benjamin Eric  YOB: 1940    Primary Care physician: [facility]    HISTORY OF PRESENT ILLNESS: The patient is a 80 y.o.  female with a history of AFL, AT, HTN, and mitral regurgitation. In 1/2016 she was admitted for atrial flutter. ADAM showed moderate to severe MR. In 1/2016 she had RFCA of left sided atypical atrial flutter and left sided AT originating from right superior pulmonary vein. In 1/2016 EKG showed AFL. Echo showed EF of 55%. In 11/2016 echo showed EF of 55% and moderate MR. In 4/2022 metoprolol was decreased due to bradycardia. Cardiac monitor showed predominately SR with 0.99% PAC vurden and <0.1% PVC burden. Daughter later called in stating her heart rate was still low in the 40's. Metoprolol decreased to 12.5 mg. In 7/2022 EKG showed SB with HR of 44 bpm. In 1/2023 she was admitted for pneumonia and acute respiratory failure. EKG showed AF. Today she is being seen for AF. EKG shows SB with first degree block, 55 bpm. She is seen today with her daughter. She is feeling back to normal following her hospitalization. She has been trying to walk more. She does dishes and normal self care activities. She denies dizziness, SOB and chest pain. She has not had any falls. She denies blood in her urine or stool. She has energy throughout the day and denies fatigue. She lives with her other daughter who manages her medications. Past Medical History:   has a past medical history of Atrial flutter ([address] Atrial tachycardia ([address] CTS (carpal tunnel syndrome), HLD (hyperlipidemia), Hypertension, Mitral regurgitation, Mixed hyperlipidemia, Osteopenia, Pneumonia due to group B Streptococcus, unspecified laterality, unspecified part of lung ([address] and Shingles.     Past Surgical History:   has a past surgical history that includes knee surgery (Left, 2010); Colonoscopy; ablation of dysrhythmic focus (1/13/16); Cataract removal with implant (Left, 05/01/2018); and eye surgery (Right, 06/05/2018). Home Medications:    Prior to Admission medications    Medication Sig Start Date End Date Taking? Authorizing Provider   DULoxetine (CYMBALTA) 30 MG extended release capsule TAKE ONE CAPSULE BY MOUTH DAILY 1/20/23  Yes Vonda Santa DO   donepezil (ARICEPT) 10 MG tablet TAKE ONE TABLET BY MOUTH ONCE NIGHTLY 1/20/23  Yes Vonda Santa DO   ELIQUIS 5 MG TABS tablet TAKE ONE TABLET BY MOUTH TWICE A DAY 1/11/23  Yes Alexa Ortiz MD   dilTIAZem (TIAZAC) 180 MG extended release capsule TAKE ONE CAPSULE BY MOUTH DAILY 1/2/23  Yes NICKY Juarez CNP   losartan (COZAAR) 100 MG tablet TAKE ONE TABLET BY MOUTH DAILY 11/10/22  Yes NICKY Marks CNP   memantine (NAMENDA) 10 MG tablet Take 1 tablet by mouth 2 times daily 9/14/22  Yes Claudia Love MD   metoprolol succinate (TOPROL XL) 25 MG extended release tablet Take 1 tablet by mouth daily  Patient taking differently: Take 12.5 mg by mouth daily 4/22/22  Yes Kavya Lay MD   atorvastatin (LIPITOR) 10 MG tablet TAKE ONE TABLET BY MOUTH DAILY 1/5/22  Yes Claudia Love MD   Cholecalciferol (VITAMIN D3) 50 MCG (2000 UT) CAPS Take by mouth   Yes Historical Provider, MD   memantine (NAMENDA) 5 MG tablet 1 po am and 2 po qhs x 2 wk, then 2 po bid 9/14/22   Claudia Love MD     Allergies:  Lisinopril    Social History:   reports that she has never smoked. She has never used smokeless tobacco. She reports that she does not drink alcohol and does not use drugs. Family History: family history includes Cancer in her father and mother; High Cholesterol in her brother; Other in her mother; Stroke in her brother. All 14 point review of systems are completed and pertinent positives are mentioned in the history of present illness. Other systems are reviewed and are negative.      PHYSICAL EXAM:    Vital signs:    BP 130/70   Pulse 55   Ht 5' 6\" (1.676 m)   Wt 164 lb (74.4 kg)   SpO2 95%   BMI 26.47 kg/m²      Constitutional and general appearance: alert, cooperative, no distress, and appears stated age  [de-identified]: PERRL, no cervical lymphadenopathy. No masses palpable. Normal oral mucosa  Respiratory:  Normal excursion and expansion without use of accessory muscles  Resp auscultation: Normal breath sounds without wheezing, rhonchi, and rales  Cardiovascular: The apical impulse is not displaced  Heart tones are crisp and normal. regular S1 and S2, +murmur  Jugular venous pulsation Normal  The carotid upstroke is normal in amplitude and contour without delay or bruit  Peripheral pulses are symmetrical and full   Abdomen:  No masses or tenderness  Bowel sounds present  Extremities:   No cyanosis or clubbing   No lower extremity edema   Skin: warm and dry  Neurological:  Alert and oriented  Moves all extremities well  No abnormalities of mood, affect, memory, mentation, or behavior are noted    DATA:    ECG 2/09/2023:  SR with first degree block, HR 55 bpm    Stress Test 02/24/2022  Summary   No evidence of significant myocardial ischemia or scar. Normal LV size and systolic function. Calculated LVEF of >65%. Overall findings represent a low risk scan. ECHO 11/29/2016:    Summary   Normal left ventricle size with mild concentric left ventricular   hypertrophy. Normal systolic function with an estimated ejection fraction of 55%. No regional wall motion abnormalities are seen. Elevated left ventricular diastolic filling pressure ( E/e' 14.5 ). The left atrium is severely dilated. Moderate posterior mitral annular calcification is present. Moderate mitral regurgitation. Aortic valve appears sclerotic but opens adequately. All labs and testing reviewed. CARDIOLOGY LABS:   CBC: No results for input(s): WBC, HGB, HCT, PLT in the last 72 hours.   BMP: No results for input(s): NA, K, CO2, BUN, CREATININE, LABGLOM, GLUCOSE in the last 72 hours. PT/INR: No results for input(s): PROTIME, INR in the last 72 hours. APTT:No results for input(s): APTT in the last 72 hours. FASTING LIPID PANEL:  Lab Results   Component Value Date/Time    HDL 40 11/11/2020 11:15 AM    LDLCALC 101 11/11/2020 11:15 AM    TRIG 117 11/11/2020 11:15 AM     LIVER PROFILE:No results for input(s): AST, ALT, ALB in the last 72 hours. IMPRESSION:    Patient Active Problem List   Diagnosis    Osteopenia    Atypical atrial flutter (HCC)    Typical atrial flutter (HCC)    Non-rheumatic mitral valve stenosis    Non-rheumatic mitral regurgitation    Mixed hyperlipidemia    Hypertension    Pneumonia due to group B Streptococcus, unspecified laterality, unspecified part of lung (HCC)    Acute respiratory failure with hypoxia (HCC)    PNA (pneumonia)     Assessment:   Paroxysmal atrial arrhythmias (AF/AFL/AT): oongoing   -s/p RFCA of a left sided atypical atrial flutter and left sided AT, originating from right superior pulmonary vein 1/2016   -[id number] score: 4 (age, gender, HTN)   -AF documented on EKG 1/20/2023, back in SR today  HTN: controlled   HLD  Moderate mitral regurgitation on echo 11/2016  Dementia       Plan:   1. Continue Eliquis, Toprol and Cardizem   2. Annual CBC and BMP due 1/2024  3. Monitor heart rate at home with pulse oximeter  4.  Follow up in 6 months or sooner if needed     NICKY Lopez-CNP  [employer]  [phone]

## 2023-02-14 NOTE — PROGRESS NOTES
Assessment/Plan:    Kenneth Gloria was seen today for hypertension. Diagnoses and all orders for this visit:    Alzheimer disease ([address]   Cont aricpet and namenda   Dtr Junior Benitez asks about 1 yr f/u [employer] driving assessment, initially done 7/2023. At 6 mo appt, will place order for f/u driving eval via [employer]. History of pneumonia  -     XR CHEST STANDARD (2 VW); Future   Clinically pt has recovered. Lipid screening  -     Lipid Panel; Future    Low hemoglobin as noted during hosp stay  -     CBC with Auto Differential; Future    PAF (paroxysmal atrial fibrillation) (HCC)   Cont eliquis, asymptomatic. There are no Patient Instructions on file for this visit. F/u drive in Fall 0988    Patient: Patt Luevano is a 80 y. o.female who presents today with the following Chief Complaint(s):  Chief Complaint   Patient presents with    Hypertension         HPI: Pt with AD(per neuropsych [facility] Jamari Apple [facility] PAF, HLD, HTN, MR has been at baseline. Dtr Junior Benitez who brings pt in for appt agrees. Namenda 5 was added to aricept 10 6/2022. Does has been increased gradually to 10 bid. Is tolerating. Pt completed driving eval 0/3742 by Dru Milian and was approved to drive locally. Dtr confirms dtr drives safely and pt denies getting lost. Pt conts to live with dtr Katya Vital. Dtr feels pt needs prompts to move around. Was sedentary prior to recent pna dx. Was admitted 1/20-1/22/23 for pna. CXR revealed infrahilar airspace opacities. No further cough, sob, wheeze, fever.     Current Outpatient Medications   Medication Sig Dispense Refill    DULoxetine (CYMBALTA) 30 MG extended release capsule TAKE ONE CAPSULE BY MOUTH DAILY 90 capsule 3    donepezil (ARICEPT) 10 MG tablet TAKE ONE TABLET BY MOUTH ONCE NIGHTLY 90 tablet 3    ELIQUIS 5 MG TABS tablet TAKE ONE TABLET BY MOUTH TWICE A  tablet 3    dilTIAZem (TIAZAC) 180 MG extended release capsule TAKE ONE CAPSULE BY MOUTH DAILY 90 capsule 3    losartan (COZAAR) 100 MG tablet TAKE ONE TABLET BY MOUTH DAILY 90 tablet 3    memantine (NAMENDA) 5 MG tablet 1 po am and 2 po qhs x 2 wk, then 2 po bid 98 tablet 0    memantine (NAMENDA) 10 MG tablet Take 1 tablet by mouth 2 times daily 180 tablet 3    metoprolol succinate (TOPROL XL) 25 MG extended release tablet Take 1 tablet by mouth daily (Patient taking differently: Take 12.5 mg by mouth daily) 180 tablet 3    atorvastatin (LIPITOR) 10 MG tablet TAKE ONE TABLET BY MOUTH DAILY 90 tablet 3    Cholecalciferol (VITAMIN D3) 50 MCG (2000 UT) CAPS Take by mouth       No current facility-administered medications for this visit. Patient's past medical history,surgical history, family history, medications,  and allergies  were all reviewed and updated as appropriate today. Review of Systems  As above    Physical Exam  Constitutional:       General: She is not in acute distress. Appearance: Normal appearance. She is well-developed. She is not ill-appearing. HENT:      Head: Normocephalic and atraumatic. Right Ear: Tympanic membrane, ear canal and external ear normal.      Left Ear: Tympanic membrane, ear canal and external ear normal.      Mouth/Throat:      Pharynx: Oropharynx is clear. No oropharyngeal exudate. Eyes:      General: No scleral icterus. Right eye: No discharge. Left eye: No discharge. Extraocular Movements: Extraocular movements intact. Conjunctiva/sclera: Conjunctivae normal.   Neck:      Vascular: No carotid bruit. Comments: Neg TMG  Cardiovascular:      Rate and Rhythm: Normal rate and regular rhythm. Pulses: Normal pulses. Heart sounds: Murmur (1/6 JACKELINE) heard. Pulmonary:      Effort: Pulmonary effort is normal. No respiratory distress. Breath sounds: Normal breath sounds. Abdominal:      General: Bowel sounds are normal. There is no distension. Palpations: Abdomen is soft. There is no mass. Tenderness:  There is no abdominal tenderness. Musculoskeletal:         General: Normal range of motion. Cervical back: Normal range of motion and neck supple. Right lower leg: No edema. Left lower leg: No edema. Lymphadenopathy:      Cervical: No cervical adenopathy. Skin:     General: Skin is warm and dry. Capillary Refill: Capillary refill takes less than 2 seconds. Coloration: Skin is not jaundiced or pale. Findings: No rash. Neurological:      General: No focal deficit present. Mental Status: She is alert and oriented to person, place, and time. Cranial Nerves: No cranial nerve deficit. Deep Tendon Reflexes: Reflexes are normal and symmetric. Psychiatric:         Mood and Affect: Mood normal.         Behavior: Behavior normal.         Thought Content:  Thought content normal.         Judgment: Judgment normal.         /70   Pulse 54   Ht 5' 6\" (1.676 m)   Wt 164 lb (74.4 kg)   SpO2 95%   BMI 26.47 kg/m²

## 2023-02-15 ENCOUNTER — OFFICE VISIT (OUTPATIENT)
Dept: FAMILY MEDICINE CLINIC | Age: 83
End: 2023-02-15
Payer: MEDICARE

## 2023-02-15 VITALS
WEIGHT: 164 LBS | DIASTOLIC BLOOD PRESSURE: 70 MMHG | BODY MASS INDEX: 26.36 KG/M2 | SYSTOLIC BLOOD PRESSURE: 130 MMHG | HEIGHT: 66 IN | HEART RATE: 54 BPM | OXYGEN SATURATION: 95 %

## 2023-02-15 DIAGNOSIS — G30.9 ALZHEIMER DISEASE (HCC): Primary | ICD-10-CM

## 2023-02-15 DIAGNOSIS — Z13.220 LIPID SCREENING: ICD-10-CM

## 2023-02-15 DIAGNOSIS — D64.9 LOW HEMOGLOBIN: ICD-10-CM

## 2023-02-15 DIAGNOSIS — I48.0 PAF (PAROXYSMAL ATRIAL FIBRILLATION) (HCC): ICD-10-CM

## 2023-02-15 DIAGNOSIS — F02.80 ALZHEIMER DISEASE (HCC): Primary | ICD-10-CM

## 2023-02-15 DIAGNOSIS — Z87.01 HISTORY OF PNEUMONIA: ICD-10-CM

## 2023-02-15 LAB
BASOPHILS ABSOLUTE: 0 K/UL (ref 0–0.2)
BASOPHILS RELATIVE PERCENT: 0.8 %
CHOLESTEROL, TOTAL: 223 MG/DL (ref 0–199)
EOSINOPHILS ABSOLUTE: 0.2 K/UL (ref 0–0.6)
EOSINOPHILS RELATIVE PERCENT: 4.2 %
HCT VFR BLD CALC: 39 % (ref 36–48)
HDLC SERPL-MCNC: 33 MG/DL (ref 40–60)
HEMOGLOBIN: 13.1 G/DL (ref 12–16)
LDL CHOLESTEROL CALCULATED: 152 MG/DL
LYMPHOCYTES ABSOLUTE: 1.5 K/UL (ref 1–5.1)
LYMPHOCYTES RELATIVE PERCENT: 27.8 %
MCH RBC QN AUTO: 30.4 PG (ref 26–34)
MCHC RBC AUTO-ENTMCNC: 33.6 G/DL (ref 31–36)
MCV RBC AUTO: 90.6 FL (ref 80–100)
MONOCYTES ABSOLUTE: 0.6 K/UL (ref 0–1.3)
MONOCYTES RELATIVE PERCENT: 10.2 %
NEUTROPHILS ABSOLUTE: 3.2 K/UL (ref 1.7–7.7)
NEUTROPHILS RELATIVE PERCENT: 57 %
PDW BLD-RTO: 13.9 % (ref 12.4–15.4)
PLATELET # BLD: 275 K/UL (ref 135–450)
PMV BLD AUTO: 10 FL (ref 5–10.5)
RBC # BLD: 4.3 M/UL (ref 4–5.2)
TRIGL SERPL-MCNC: 191 MG/DL (ref 0–150)
VLDLC SERPL CALC-MCNC: 38 MG/DL
WBC # BLD: 5.5 K/UL (ref 4–11)

## 2023-02-15 PROCEDURE — 99214 OFFICE O/P EST MOD 30 MIN: CPT | Performed by: FAMILY MEDICINE

## 2023-02-15 PROCEDURE — 1123F ACP DISCUSS/DSCN MKR DOCD: CPT | Performed by: FAMILY MEDICINE

## 2023-02-15 PROCEDURE — 3078F DIAST BP <80 MM HG: CPT | Performed by: FAMILY MEDICINE

## 2023-02-15 PROCEDURE — 3075F SYST BP GE 130 - 139MM HG: CPT | Performed by: FAMILY MEDICINE

## 2023-02-16 DIAGNOSIS — E78.2 MIXED HYPERLIPIDEMIA: ICD-10-CM

## 2023-02-16 RX ORDER — ATORVASTATIN CALCIUM 10 MG/1
TABLET, FILM COATED ORAL
Qty: 90 TABLET | Refills: 3 | Status: SHIPPED | OUTPATIENT
Start: 2023-02-16

## 2023-02-28 ENCOUNTER — HOSPITAL ENCOUNTER (OUTPATIENT)
Age: 83
Discharge: HOME | End: 2023-02-28
Payer: MEDICARE

## 2023-02-28 ENCOUNTER — HOSPITAL ENCOUNTER (OUTPATIENT)
Dept: GENERAL RADIOLOGY | Age: 83
Discharge: HOME | End: 2023-02-28
Payer: MEDICARE

## 2023-02-28 DIAGNOSIS — Z87.01 HISTORY OF PNEUMONIA: ICD-10-CM

## 2023-02-28 PROCEDURE — 71046 X-RAY EXAM CHEST 2 VIEWS: CPT

## 2023-04-07 DIAGNOSIS — I48.4 ATYPICAL ATRIAL FLUTTER (HCC): ICD-10-CM

## 2023-04-07 RX ORDER — METOPROLOL SUCCINATE 25 MG/1
12.5 TABLET, EXTENDED RELEASE ORAL DAILY
Qty: 45 TABLET | Refills: 2 | Status: SHIPPED | OUTPATIENT
Start: 2023-04-07

## 2023-06-28 ENCOUNTER — OFFICE VISIT (OUTPATIENT)
Dept: FAMILY MEDICINE CLINIC | Age: 83
End: 2023-06-28
Payer: MEDICARE

## 2023-06-28 ENCOUNTER — TELEPHONE (OUTPATIENT)
Dept: FAMILY MEDICINE CLINIC | Age: 83
End: 2023-06-28

## 2023-06-28 VITALS
WEIGHT: 160 LBS | OXYGEN SATURATION: 96 % | HEIGHT: 66 IN | SYSTOLIC BLOOD PRESSURE: 100 MMHG | HEART RATE: 58 BPM | BODY MASS INDEX: 25.71 KG/M2 | DIASTOLIC BLOOD PRESSURE: 70 MMHG

## 2023-06-28 DIAGNOSIS — I48.0 PAF (PAROXYSMAL ATRIAL FIBRILLATION) (HCC): ICD-10-CM

## 2023-06-28 DIAGNOSIS — G30.9 ALZHEIMER DISEASE (HCC): ICD-10-CM

## 2023-06-28 DIAGNOSIS — L98.9 SKIN LESION OF FACE: Primary | ICD-10-CM

## 2023-06-28 DIAGNOSIS — F02.80 ALZHEIMER DISEASE (HCC): ICD-10-CM

## 2023-06-28 DIAGNOSIS — I10 HYPERTENSION, UNSPECIFIED TYPE: ICD-10-CM

## 2023-06-28 PROCEDURE — 1123F ACP DISCUSS/DSCN MKR DOCD: CPT | Performed by: FAMILY MEDICINE

## 2023-06-28 PROCEDURE — 3078F DIAST BP <80 MM HG: CPT | Performed by: FAMILY MEDICINE

## 2023-06-28 PROCEDURE — 3074F SYST BP LT 130 MM HG: CPT | Performed by: FAMILY MEDICINE

## 2023-06-28 PROCEDURE — 99214 OFFICE O/P EST MOD 30 MIN: CPT | Performed by: FAMILY MEDICINE

## 2023-06-28 NOTE — TELEPHONE ENCOUNTER
Pls contact Dr Roselind Phoenix office at [facility] to request copy of 4/2023 annual eval Thx.

## 2023-09-14 ENCOUNTER — OFFICE VISIT (OUTPATIENT)
Dept: CARDIOLOGY CLINIC | Age: 83
End: 2023-09-14
Payer: MEDICARE

## 2023-09-14 VITALS
DIASTOLIC BLOOD PRESSURE: 62 MMHG | HEIGHT: 66 IN | WEIGHT: 165.5 LBS | HEART RATE: 75 BPM | SYSTOLIC BLOOD PRESSURE: 120 MMHG | BODY MASS INDEX: 26.6 KG/M2 | OXYGEN SATURATION: 96 %

## 2023-09-14 DIAGNOSIS — I48.3 TYPICAL ATRIAL FLUTTER (HCC): ICD-10-CM

## 2023-09-14 DIAGNOSIS — I48.4 ATYPICAL ATRIAL FLUTTER (HCC): Primary | ICD-10-CM

## 2023-09-14 PROCEDURE — 93000 ELECTROCARDIOGRAM COMPLETE: CPT | Performed by: INTERNAL MEDICINE

## 2023-09-14 PROCEDURE — 3078F DIAST BP <80 MM HG: CPT | Performed by: INTERNAL MEDICINE

## 2023-09-14 PROCEDURE — 3074F SYST BP LT 130 MM HG: CPT | Performed by: INTERNAL MEDICINE

## 2023-09-14 PROCEDURE — 99214 OFFICE O/P EST MOD 30 MIN: CPT | Performed by: INTERNAL MEDICINE

## 2023-09-14 PROCEDURE — 1123F ACP DISCUSS/DSCN MKR DOCD: CPT | Performed by: INTERNAL MEDICINE

## 2023-09-14 NOTE — PROGRESS NOTES
[address] [facility]   Electrophysiology Consult Note    Date: 9/14/23  Patient Name: Tessie Goldsmith  YOB: 1940    Primary Care Physician: Yannick Pierson MD    CHIEF COMPLAINT:   Chief Complaint   Patient presents with    Follow-up     6 month follow up    Other     Atrial flutter     HISTORY OF PRESENT ILLNESS: Tessie Goldsmith is a 80 y.o. female with a history of atrial flutter, AT, HTN, mitral regurgitation, and mild mitral stenosis. She was admitted in 1/2016 with atrial flutter with RVR but was asymptomatic with the arrhythmia. ADAM in 1/2016 showed moderate to severe MR. In 1/2016 she had RFCA of a left sided atypical atrial flutter and left sided AT, originating from right superior pulmonary vein with Dr. Chung Denney. She returned in 1/2016 and her EKG showed atrial flutter. Echo in 1/2016 showed an EF of 55%. Repeat echo in 11/2016 showed an EF of 55% and moderate MR. She has been in sinus rhythm at subsequent visits. She has recent stress due to family moving in with her. Has some memory loss and some fatigue. Was recently started on losartan and reports BP is fluctuating at home. On 4/15/2021, patient's ECG demonstrates SR w/ first degree AV block. . Patient reports she is doing well. She reports she was COVID positive in January 2020 requiring hospitalization. She reports she is not 100% but is feeling much better. She reports she just got her first COVID vaccine yesterday and tolerated that well. She reports she tries to stay active by walking in her neighborhood and tolerates that activity well. On 4/21/2022, she reported that she is doing ok. Her daughter thinks that she has slowed down a bit in energy levels. She is dealing with memory issues. She has started using furniture as assistance for walking. She is asymptomatic with her AF. At her office visit on 04/21/2022, her Metoprolol was decreased to 25 mg daily due to bradycardia.  Patient wore a cardiac event monitor from

## 2023-10-03 DIAGNOSIS — F02.80 ALZHEIMER DISEASE (HCC): ICD-10-CM

## 2023-10-03 DIAGNOSIS — G30.9 ALZHEIMER DISEASE (HCC): ICD-10-CM

## 2023-10-03 RX ORDER — MEMANTINE HYDROCHLORIDE 10 MG/1
10 TABLET ORAL 2 TIMES DAILY
Qty: 180 TABLET | Refills: 3 | Status: SHIPPED | OUTPATIENT
Start: 2023-10-03

## 2023-11-05 DIAGNOSIS — I10 HYPERTENSION, UNSPECIFIED TYPE: ICD-10-CM

## 2023-11-06 RX ORDER — LOSARTAN POTASSIUM 100 MG/1
TABLET ORAL
Qty: 90 TABLET | Refills: 3 | Status: SHIPPED | OUTPATIENT
Start: 2023-11-06

## 2023-12-12 SDOH — ECONOMIC STABILITY: FOOD INSECURITY: WITHIN THE PAST 12 MONTHS, THE FOOD YOU BOUGHT JUST DIDN'T LAST AND YOU DIDN'T HAVE MONEY TO GET MORE.: NEVER TRUE

## 2023-12-12 SDOH — ECONOMIC STABILITY: INCOME INSECURITY: HOW HARD IS IT FOR YOU TO PAY FOR THE VERY BASICS LIKE FOOD, HOUSING, MEDICAL CARE, AND HEATING?: NOT HARD AT ALL

## 2023-12-12 SDOH — ECONOMIC STABILITY: FOOD INSECURITY: WITHIN THE PAST 12 MONTHS, YOU WORRIED THAT YOUR FOOD WOULD RUN OUT BEFORE YOU GOT MONEY TO BUY MORE.: NEVER TRUE

## 2023-12-12 SDOH — ECONOMIC STABILITY: TRANSPORTATION INSECURITY
IN THE PAST 12 MONTHS, HAS LACK OF TRANSPORTATION KEPT YOU FROM MEETINGS, WORK, OR FROM GETTING THINGS NEEDED FOR DAILY LIVING?: NO

## 2023-12-12 SDOH — ECONOMIC STABILITY: HOUSING INSECURITY
IN THE LAST 12 MONTHS, WAS THERE A TIME WHEN YOU DID NOT HAVE A STEADY PLACE TO SLEEP OR SLEPT IN A SHELTER (INCLUDING NOW)?: NO

## 2023-12-13 ENCOUNTER — TELEPHONE (OUTPATIENT)
Dept: FAMILY MEDICINE CLINIC | Age: 83
End: 2023-12-13

## 2023-12-13 ENCOUNTER — OFFICE VISIT (OUTPATIENT)
Dept: FAMILY MEDICINE CLINIC | Age: 83
End: 2023-12-13
Payer: MEDICARE

## 2023-12-13 VITALS
BODY MASS INDEX: 27.48 KG/M2 | HEIGHT: 66 IN | DIASTOLIC BLOOD PRESSURE: 68 MMHG | HEART RATE: 81 BPM | SYSTOLIC BLOOD PRESSURE: 124 MMHG | OXYGEN SATURATION: 96 % | WEIGHT: 171 LBS

## 2023-12-13 DIAGNOSIS — D64.9 LOW HEMOGLOBIN: ICD-10-CM

## 2023-12-13 DIAGNOSIS — E55.9 VITAMIN D DEFICIENCY: ICD-10-CM

## 2023-12-13 DIAGNOSIS — Z23 NEEDS FLU SHOT: ICD-10-CM

## 2023-12-13 DIAGNOSIS — Z12.11 COLON CANCER SCREENING: ICD-10-CM

## 2023-12-13 DIAGNOSIS — F02.80 ALZHEIMER DISEASE (HCC): Primary | ICD-10-CM

## 2023-12-13 DIAGNOSIS — F02.80 ALZHEIMER DISEASE (HCC): ICD-10-CM

## 2023-12-13 DIAGNOSIS — F32.5 DEPRESSION, MAJOR, SINGLE EPISODE, COMPLETE REMISSION (HCC): ICD-10-CM

## 2023-12-13 DIAGNOSIS — G30.9 ALZHEIMER DISEASE (HCC): Primary | ICD-10-CM

## 2023-12-13 DIAGNOSIS — G30.9 ALZHEIMER DISEASE (HCC): ICD-10-CM

## 2023-12-13 PROCEDURE — 1123F ACP DISCUSS/DSCN MKR DOCD: CPT | Performed by: FAMILY MEDICINE

## 2023-12-13 PROCEDURE — 99214 OFFICE O/P EST MOD 30 MIN: CPT | Performed by: FAMILY MEDICINE

## 2023-12-13 PROCEDURE — 90694 VACC AIIV4 NO PRSRV 0.5ML IM: CPT | Performed by: FAMILY MEDICINE

## 2023-12-13 PROCEDURE — G0008 ADMIN INFLUENZA VIRUS VAC: HCPCS | Performed by: FAMILY MEDICINE

## 2023-12-13 PROCEDURE — 3074F SYST BP LT 130 MM HG: CPT | Performed by: FAMILY MEDICINE

## 2023-12-13 PROCEDURE — 3078F DIAST BP <80 MM HG: CPT | Performed by: FAMILY MEDICINE

## 2023-12-13 NOTE — TELEPHONE ENCOUNTER
Pt had cscope 1/18/18, report in chart, but I don't see path report from polyps. Pls obtain surgical path report.

## 2023-12-13 NOTE — PROGRESS NOTES
recalls taking meds though dtr reminds her if she forgets. Jessica Hammond about homeopathic supplemements, Natural green healing. Requests B12, D, T3, B9 as has read     Dtr feels cymbalta helps agitation. Current Outpatient Medications   Medication Sig Dispense Refill    losartan (COZAAR) 100 MG tablet TAKE ONE TABLET BY MOUTH DAILY 90 tablet 3    memantine (NAMENDA) 10 MG tablet TAKE ONE TABLET BY MOUTH TWICE A  tablet 3    atorvastatin (LIPITOR) 10 MG tablet TAKE ONE TABLET BY MOUTH DAILY 90 tablet 3    DULoxetine (CYMBALTA) 30 MG extended release capsule TAKE ONE CAPSULE BY MOUTH DAILY 90 capsule 3    donepezil (ARICEPT) 10 MG tablet TAKE ONE TABLET BY MOUTH ONCE NIGHTLY 90 tablet 3    ELIQUIS 5 MG TABS tablet TAKE ONE TABLET BY MOUTH TWICE A  tablet 3    dilTIAZem (TIAZAC) 180 MG extended release capsule TAKE ONE CAPSULE BY MOUTH DAILY 90 capsule 3    Cholecalciferol (VITAMIN D3) 50 MCG (2000 UT) CAPS Take by mouth       No current facility-administered medications for this visit. Patient's past medical history,surgical history, family history, medications,  and allergies  were all reviewed and updated as appropriate today. Review of Systems  As above    Physical Exam  Constitutional:       General: She is not in acute distress. Appearance: Normal appearance. She is well-developed. She is not ill-appearing. HENT:      Head: Normocephalic and atraumatic. Right Ear: External ear normal.      Left Ear: External ear normal.      Mouth/Throat:      Pharynx: Oropharynx is clear. No oropharyngeal exudate. Eyes:      General: No scleral icterus. Right eye: No discharge. Left eye: No discharge. Extraocular Movements: Extraocular movements intact. Conjunctiva/sclera: Conjunctivae normal.   Neck:      Vascular: No carotid bruit. Comments: Neg TMG  Cardiovascular:      Rate and Rhythm: Normal rate and regular rhythm. Pulses: Normal pulses.

## 2023-12-14 LAB
25(OH)D3 SERPL-MCNC: 30.8 NG/ML
ALBUMIN SERPL-MCNC: 4.4 G/DL (ref 3.4–5)
ALBUMIN/GLOB SERPL: 1.8 {RATIO} (ref 1.1–2.2)
ALP SERPL-CCNC: 108 U/L (ref 40–129)
ALT SERPL-CCNC: 18 U/L (ref 10–40)
ANION GAP SERPL CALCULATED.3IONS-SCNC: 11 MMOL/L (ref 3–16)
AST SERPL-CCNC: 21 U/L (ref 15–37)
BILIRUB SERPL-MCNC: 0.7 MG/DL (ref 0–1)
BUN SERPL-MCNC: 20 MG/DL (ref 7–20)
CALCIUM SERPL-MCNC: 9.4 MG/DL (ref 8.3–10.6)
CHLORIDE SERPL-SCNC: 104 MMOL/L (ref 99–110)
CO2 SERPL-SCNC: 28 MMOL/L (ref 21–32)
CREAT SERPL-MCNC: 1.1 MG/DL (ref 0.6–1.2)
FOLATE SERPL-MCNC: 9.43 NG/ML (ref 4.78–24.2)
GFR SERPLBLD CREATININE-BSD FMLA CKD-EPI: 50 ML/MIN/{1.73_M2}
GLUCOSE SERPL-MCNC: 71 MG/DL (ref 70–99)
POTASSIUM SERPL-SCNC: 4.9 MMOL/L (ref 3.5–5.1)
PROT SERPL-MCNC: 6.8 G/DL (ref 6.4–8.2)
SODIUM SERPL-SCNC: 143 MMOL/L (ref 136–145)
TSH SERPL DL<=0.005 MIU/L-ACNC: 0.9 UIU/ML (ref 0.27–4.2)
VIT B12 SERPL-MCNC: 387 PG/ML (ref 211–911)

## 2023-12-26 DIAGNOSIS — I48.4 ATYPICAL ATRIAL FLUTTER (HCC): ICD-10-CM

## 2023-12-26 RX ORDER — DILTIAZEM HYDROCHLORIDE 180 MG/1
180 CAPSULE, EXTENDED RELEASE ORAL DAILY
Qty: 90 CAPSULE | Refills: 3 | Status: SHIPPED | OUTPATIENT
Start: 2023-12-26

## 2023-12-26 NOTE — TELEPHONE ENCOUNTER
Last Office Visit  -  12/13/2023  Next Office Visit  -  06/12/2024    Last Filled  -    Last UDS -    Contract -

## 2023-12-26 NOTE — TELEPHONE ENCOUNTER
Bhavana is requesting rx for dilTIAZem GUILLAUME [facility]) 180 MG extended release capsule     OV f/u 12/13/23  Future appt 6 month f/u 6/12/24

## 2024-01-08 DIAGNOSIS — I48.4 ATYPICAL ATRIAL FLUTTER (HCC): ICD-10-CM

## 2024-01-08 RX ORDER — APIXABAN 5 MG/1
TABLET, FILM COATED ORAL
Qty: 60 TABLET | Refills: 12 | Status: SHIPPED | OUTPATIENT
Start: 2024-01-08

## 2024-01-08 NOTE — TELEPHONE ENCOUNTER
Last Office Visit  -  12/13/23  Next Office Visit  -  6/12/24    Last Filled  -  1/11/23  Last UDS -    Contract -

## 2024-01-15 NOTE — TELEPHONE ENCOUNTER
Radha is requesting a rx for donepezil (ARICEPT) 10 MG tablet       Last Office Visit  -  12/13/23  Next Office Visit  -  6/12/24

## 2024-01-16 DIAGNOSIS — F41.9 ANXIETY: ICD-10-CM

## 2024-01-16 RX ORDER — DULOXETIN HYDROCHLORIDE 30 MG/1
30 CAPSULE, DELAYED RELEASE ORAL DAILY
Qty: 90 CAPSULE | Refills: 3 | Status: SHIPPED | OUTPATIENT
Start: 2024-01-16

## 2024-01-16 RX ORDER — DONEPEZIL HYDROCHLORIDE 10 MG/1
TABLET, FILM COATED ORAL
Qty: 90 TABLET | Refills: 3 | Status: SHIPPED | OUTPATIENT
Start: 2024-01-16

## 2024-01-16 NOTE — TELEPHONE ENCOUNTER
Last Office Visit  -  12/13/23  Next Office Visit  -  6/12/24    Last Filled  -  1/20/23  Last UDS -    Contract -

## 2024-01-16 NOTE — TELEPHONE ENCOUNTER
Radha is requesting a rx for DULoxetine (CYMBALTA) 30 MG extended release capsule     Last Office Visit  -  12/13/23  Next Office Visit  -  6/12/24

## 2024-03-06 ENCOUNTER — TELEPHONE (OUTPATIENT)
Dept: FAMILY MEDICINE CLINIC | Age: 84
End: 2024-03-06

## 2024-03-06 NOTE — TELEPHONE ENCOUNTER
Has 2 episodes yesterday and today that she was not able to hear,sweating, and 115/748. Pt dtr stated she was in a daze. Pt vitals are normal.     Per RS: Sometimes, odd neurologic events happen in those with dementia and Alzheimer's Disease.   She is concerned and feels eval is needed advise ER, or if she is back to baseline,no need to do anything.     Dtr stated that she is back at her baseline and asked again what RS advised and she was given information as requested. Nothing further needed and will call with any changes.

## 2024-03-07 NOTE — TELEPHONE ENCOUNTER
Pls call dtr to see how pt is doing today. She has appt in June but we can see her next wk if dtr is concerned. Chadx.

## 2024-03-07 NOTE — TELEPHONE ENCOUNTER
Dtr contacted and she informed that pt has not had any episodes today and will check with her sister on the appt and let us know.

## 2024-03-15 DIAGNOSIS — E78.2 MIXED HYPERLIPIDEMIA: ICD-10-CM

## 2024-03-15 RX ORDER — ATORVASTATIN CALCIUM 10 MG/1
TABLET, FILM COATED ORAL
Qty: 90 TABLET | Refills: 3 | Status: SHIPPED | OUTPATIENT
Start: 2024-03-15

## 2024-03-15 NOTE — TELEPHONE ENCOUNTER
Last Office Visit  -  12/13/23  Next Office Visit  -  6/12/24    Last Filled  -    Last UDS -    Contract -

## 2024-03-21 ENCOUNTER — PATIENT MESSAGE (OUTPATIENT)
Dept: FAMILY MEDICINE CLINIC | Age: 84
End: 2024-03-21

## 2024-03-21 RX ORDER — DULOXETIN HYDROCHLORIDE 60 MG/1
60 CAPSULE, DELAYED RELEASE ORAL DAILY
Qty: 90 CAPSULE | Refills: 3 | Status: SHIPPED | OUTPATIENT
Start: 2024-03-21

## 2024-04-30 ENCOUNTER — APPOINTMENT (OUTPATIENT)
Dept: GENERAL RADIOLOGY | Age: 84
End: 2024-04-30
Payer: MEDICARE

## 2024-04-30 ENCOUNTER — HOSPITAL ENCOUNTER (OUTPATIENT)
Age: 84
Setting detail: OBSERVATION
Discharge: HOME | End: 2024-05-01
Attending: EMERGENCY MEDICINE
Payer: MEDICARE

## 2024-04-30 ENCOUNTER — TELEPHONE (OUTPATIENT)
Dept: FAMILY MEDICINE CLINIC | Age: 84
End: 2024-04-30

## 2024-04-30 ENCOUNTER — APPOINTMENT (OUTPATIENT)
Dept: CT IMAGING | Age: 84
End: 2024-04-30
Payer: MEDICARE

## 2024-04-30 DIAGNOSIS — N17.9 AKI (ACUTE KIDNEY INJURY) (HCC): ICD-10-CM

## 2024-04-30 DIAGNOSIS — R09.89 SYMPTOMS OF CEREBROVASCULAR ACCIDENT (CVA): ICD-10-CM

## 2024-04-30 DIAGNOSIS — H53.8 BLURRED VISION: Primary | ICD-10-CM

## 2024-04-30 LAB
ALBUMIN SERPL-MCNC: 3.9 G/DL (ref 3.4–5)
ALBUMIN/GLOB SERPL: 1.1 {RATIO} (ref 1.1–2.2)
ALP SERPL-CCNC: 91 U/L (ref 40–129)
ALT SERPL-CCNC: 15 U/L (ref 10–40)
ANION GAP SERPL CALCULATED.3IONS-SCNC: 13 MMOL/L (ref 3–16)
AST SERPL-CCNC: 19 U/L (ref 15–37)
BASOPHILS # BLD: 0.1 K/UL (ref 0–0.2)
BASOPHILS NFR BLD: 1 %
BILIRUB SERPL-MCNC: 0.8 MG/DL (ref 0–1)
BUN SERPL-MCNC: 29 MG/DL (ref 7–20)
CALCIUM SERPL-MCNC: 9.8 MG/DL (ref 8.3–10.6)
CHLORIDE SERPL-SCNC: 98 MMOL/L (ref 99–110)
CO2 SERPL-SCNC: 26 MMOL/L (ref 21–32)
CREAT SERPL-MCNC: 1.6 MG/DL (ref 0.6–1.2)
DEPRECATED RDW RBC AUTO: 13.2 % (ref 12.4–15.4)
EOSINOPHIL # BLD: 0.2 K/UL (ref 0–0.6)
EOSINOPHIL NFR BLD: 2.4 %
GFR SERPLBLD CREATININE-BSD FMLA CKD-EPI: 32 ML/MIN/{1.73_M2}
GLUCOSE SERPL-MCNC: 92 MG/DL (ref 70–99)
HCT VFR BLD AUTO: 46.2 % (ref 36–48)
HGB BLD-MCNC: 15.3 G/DL (ref 12–16)
INR PPP: 1.33 (ref 0.85–1.15)
LYMPHOCYTES # BLD: 1.4 K/UL (ref 1–5.1)
LYMPHOCYTES NFR BLD: 15 %
MCH RBC QN AUTO: 31.1 PG (ref 26–34)
MCHC RBC AUTO-ENTMCNC: 33.1 G/DL (ref 31–36)
MCV RBC AUTO: 94 FL (ref 80–100)
MONOCYTES # BLD: 0.9 K/UL (ref 0–1.3)
MONOCYTES NFR BLD: 9.6 %
NEUTROPHILS # BLD: 6.6 K/UL (ref 1.7–7.7)
NEUTROPHILS NFR BLD: 72 %
PLATELET # BLD AUTO: 339 K/UL (ref 135–450)
PMV BLD AUTO: 7.9 FL (ref 5–10.5)
POTASSIUM SERPL-SCNC: 4.5 MMOL/L (ref 3.5–5.1)
PROT SERPL-MCNC: 7.4 G/DL (ref 6.4–8.2)
PROTHROMBIN TIME: 16.6 SEC (ref 11.9–14.9)
RBC # BLD AUTO: 4.92 M/UL (ref 4–5.2)
SODIUM SERPL-SCNC: 137 MMOL/L (ref 136–145)
TROPONIN, HIGH SENSITIVITY: 14 NG/L (ref 0–14)
TROPONIN, HIGH SENSITIVITY: 17 NG/L (ref 0–14)
WBC # BLD AUTO: 9.1 K/UL (ref 4–11)

## 2024-04-30 PROCEDURE — 70498 CT ANGIOGRAPHY NECK: CPT

## 2024-04-30 PROCEDURE — 6360000004 HC RX CONTRAST MEDICATION: Performed by: PHYSICIAN ASSISTANT

## 2024-04-30 PROCEDURE — 6370000000 HC RX 637 (ALT 250 FOR IP): Performed by: STUDENT IN AN ORGANIZED HEALTH CARE EDUCATION/TRAINING PROGRAM

## 2024-04-30 PROCEDURE — 85610 PROTHROMBIN TIME: CPT

## 2024-04-30 PROCEDURE — G0378 HOSPITAL OBSERVATION PER HR: HCPCS

## 2024-04-30 PROCEDURE — 36415 COLL VENOUS BLD VENIPUNCTURE: CPT

## 2024-04-30 PROCEDURE — 71045 X-RAY EXAM CHEST 1 VIEW: CPT

## 2024-04-30 PROCEDURE — 96360 HYDRATION IV INFUSION INIT: CPT

## 2024-04-30 PROCEDURE — 93005 ELECTROCARDIOGRAM TRACING: CPT | Performed by: PHYSICIAN ASSISTANT

## 2024-04-30 PROCEDURE — 70450 CT HEAD/BRAIN W/O DYE: CPT

## 2024-04-30 PROCEDURE — 80053 COMPREHEN METABOLIC PANEL: CPT

## 2024-04-30 PROCEDURE — 2580000003 HC RX 258: Performed by: STUDENT IN AN ORGANIZED HEALTH CARE EDUCATION/TRAINING PROGRAM

## 2024-04-30 PROCEDURE — 99285 EMERGENCY DEPT VISIT HI MDM: CPT

## 2024-04-30 PROCEDURE — 2580000003 HC RX 258: Performed by: PHYSICIAN ASSISTANT

## 2024-04-30 PROCEDURE — 96361 HYDRATE IV INFUSION ADD-ON: CPT

## 2024-04-30 PROCEDURE — 84484 ASSAY OF TROPONIN QUANT: CPT

## 2024-04-30 PROCEDURE — 85025 COMPLETE CBC W/AUTO DIFF WBC: CPT

## 2024-04-30 RX ORDER — MEMANTINE HYDROCHLORIDE 5 MG/1
10 TABLET ORAL 2 TIMES DAILY
Status: DISCONTINUED | OUTPATIENT
Start: 2024-04-30 | End: 2024-05-01 | Stop reason: HOSPADM

## 2024-04-30 RX ORDER — POTASSIUM CHLORIDE 20 MEQ/1
40 TABLET, EXTENDED RELEASE ORAL PRN
Status: DISCONTINUED | OUTPATIENT
Start: 2024-04-30 | End: 2024-05-01

## 2024-04-30 RX ORDER — ACETAMINOPHEN 650 MG/1
650 SUPPOSITORY RECTAL EVERY 6 HOURS PRN
Status: DISCONTINUED | OUTPATIENT
Start: 2024-04-30 | End: 2024-05-01 | Stop reason: HOSPADM

## 2024-04-30 RX ORDER — POLYETHYLENE GLYCOL 3350 17 G/17G
17 POWDER, FOR SOLUTION ORAL DAILY PRN
Status: DISCONTINUED | OUTPATIENT
Start: 2024-04-30 | End: 2024-05-01 | Stop reason: HOSPADM

## 2024-04-30 RX ORDER — SODIUM CHLORIDE, SODIUM LACTATE, POTASSIUM CHLORIDE, CALCIUM CHLORIDE 600; 310; 30; 20 MG/100ML; MG/100ML; MG/100ML; MG/100ML
INJECTION, SOLUTION INTRAVENOUS CONTINUOUS
Status: DISCONTINUED | OUTPATIENT
Start: 2024-04-30 | End: 2024-05-01 | Stop reason: HOSPADM

## 2024-04-30 RX ORDER — ASPIRIN 81 MG/1
81 TABLET, CHEWABLE ORAL DAILY
Status: DISCONTINUED | OUTPATIENT
Start: 2024-04-30 | End: 2024-05-01 | Stop reason: HOSPADM

## 2024-04-30 RX ORDER — SODIUM CHLORIDE 0.9 % (FLUSH) 0.9 %
5-40 SYRINGE (ML) INJECTION EVERY 12 HOURS SCHEDULED
Status: DISCONTINUED | OUTPATIENT
Start: 2024-04-30 | End: 2024-05-01 | Stop reason: HOSPADM

## 2024-04-30 RX ORDER — SODIUM CHLORIDE 9 MG/ML
INJECTION, SOLUTION INTRAVENOUS PRN
Status: DISCONTINUED | OUTPATIENT
Start: 2024-04-30 | End: 2024-05-01 | Stop reason: HOSPADM

## 2024-04-30 RX ORDER — SODIUM CHLORIDE 0.9 % (FLUSH) 0.9 %
5-40 SYRINGE (ML) INJECTION PRN
Status: DISCONTINUED | OUTPATIENT
Start: 2024-04-30 | End: 2024-05-01 | Stop reason: HOSPADM

## 2024-04-30 RX ORDER — POTASSIUM CHLORIDE 7.45 MG/ML
10 INJECTION INTRAVENOUS PRN
Status: DISCONTINUED | OUTPATIENT
Start: 2024-04-30 | End: 2024-05-01

## 2024-04-30 RX ORDER — ONDANSETRON 4 MG/1
4 TABLET, ORALLY DISINTEGRATING ORAL EVERY 8 HOURS PRN
Status: DISCONTINUED | OUTPATIENT
Start: 2024-04-30 | End: 2024-05-01 | Stop reason: HOSPADM

## 2024-04-30 RX ORDER — DULOXETIN HYDROCHLORIDE 30 MG/1
30 CAPSULE, DELAYED RELEASE ORAL DAILY
COMMUNITY

## 2024-04-30 RX ORDER — ACETAMINOPHEN 325 MG/1
650 TABLET ORAL EVERY 6 HOURS PRN
Status: DISCONTINUED | OUTPATIENT
Start: 2024-04-30 | End: 2024-05-01 | Stop reason: HOSPADM

## 2024-04-30 RX ORDER — MAGNESIUM SULFATE IN WATER 40 MG/ML
2000 INJECTION, SOLUTION INTRAVENOUS PRN
Status: DISCONTINUED | OUTPATIENT
Start: 2024-04-30 | End: 2024-05-01

## 2024-04-30 RX ORDER — 0.9 % SODIUM CHLORIDE 0.9 %
1000 INTRAVENOUS SOLUTION INTRAVENOUS ONCE
Status: COMPLETED | OUTPATIENT
Start: 2024-04-30 | End: 2024-04-30

## 2024-04-30 RX ORDER — ONDANSETRON 2 MG/ML
4 INJECTION INTRAMUSCULAR; INTRAVENOUS EVERY 6 HOURS PRN
Status: DISCONTINUED | OUTPATIENT
Start: 2024-04-30 | End: 2024-05-01 | Stop reason: HOSPADM

## 2024-04-30 RX ORDER — DULOXETIN HYDROCHLORIDE 30 MG/1
30 CAPSULE, DELAYED RELEASE ORAL DAILY
Status: DISCONTINUED | OUTPATIENT
Start: 2024-04-30 | End: 2024-05-01 | Stop reason: HOSPADM

## 2024-04-30 RX ORDER — ATORVASTATIN CALCIUM 10 MG/1
10 TABLET, FILM COATED ORAL DAILY
Status: DISCONTINUED | OUTPATIENT
Start: 2024-04-30 | End: 2024-05-01 | Stop reason: HOSPADM

## 2024-04-30 RX ORDER — DILTIAZEM HYDROCHLORIDE 180 MG/1
180 CAPSULE, COATED, EXTENDED RELEASE ORAL DAILY
Status: DISCONTINUED | OUTPATIENT
Start: 2024-05-01 | End: 2024-05-01 | Stop reason: HOSPADM

## 2024-04-30 RX ORDER — LOSARTAN POTASSIUM 100 MG/1
100 TABLET ORAL DAILY
Status: DISCONTINUED | OUTPATIENT
Start: 2024-05-01 | End: 2024-05-01 | Stop reason: HOSPADM

## 2024-04-30 RX ADMIN — SODIUM CHLORIDE, POTASSIUM CHLORIDE, SODIUM LACTATE AND CALCIUM CHLORIDE: 600; 310; 30; 20 INJECTION, SOLUTION INTRAVENOUS at 20:13

## 2024-04-30 RX ADMIN — IOPAMIDOL 75 ML: 755 INJECTION, SOLUTION INTRAVENOUS at 16:40

## 2024-04-30 RX ADMIN — MEMANTINE 10 MG: 5 TABLET ORAL at 20:06

## 2024-04-30 RX ADMIN — DULOXETINE HYDROCHLORIDE 30 MG: 30 CAPSULE, DELAYED RELEASE ORAL at 20:06

## 2024-04-30 RX ADMIN — ATORVASTATIN CALCIUM 10 MG: 10 TABLET, FILM COATED ORAL at 20:06

## 2024-04-30 RX ADMIN — SODIUM CHLORIDE 1000 ML: 9 INJECTION, SOLUTION INTRAVENOUS at 17:27

## 2024-04-30 RX ADMIN — ASPIRIN 81 MG 81 MG: 81 TABLET ORAL at 20:06

## 2024-04-30 RX ADMIN — APIXABAN 2.5 MG: 2.5 TABLET, FILM COATED ORAL at 20:06

## 2024-04-30 ASSESSMENT — PAIN - FUNCTIONAL ASSESSMENT
PAIN_FUNCTIONAL_ASSESSMENT: 0-10
PAIN_FUNCTIONAL_ASSESSMENT: 0-10

## 2024-04-30 ASSESSMENT — PAIN SCALES - GENERAL
PAINLEVEL_OUTOF10: 0

## 2024-04-30 ASSESSMENT — VISUAL ACUITY
OD: 20/20
OS: 20/13
OU: 20/25

## 2024-04-30 NOTE — ED NOTES
RN spoke to patients family who states the following information:  This morning patient was walking through her home when she became diaphoretic and stated \"I just dont feel good\".   Later, the patient was able to go to a hair appointment and lunch with her daughter without issues. While at lunch at 1530 patient walked back from the restroom and told her daughter that she \"did not feel right\" and \"my eyes are blurry\". Patients family unsure if patient had blurred vision in one or both eyes. Patients daughter also states patient was c/o dizziness and \"feeling hot\".   Patient on Eliquis per her family.

## 2024-04-30 NOTE — ED PROVIDER NOTES
THIS IS MY HAYDEN SUPERVISORY AND SHARED VISIT NOTE:    I personally saw the patient and made/approved the management plan and take responsibility for the patient management.    History: 83-year-old female present for evaluation of blurred vision.  This started around 330.  Patient continues to have blurred vision at time of initial evaluation.  She is not able to state which eye is actually blurred.  She has no additional weakness in the extremities or vertiginous symptoms.  She is on anticoagulation.  She denies current blurred vision at this time and it reportedly lasted for short period of time.    Exam: There is no focal weakness, cranial nerves II through XII are grossly intact.  Moving all 4 extremities equally.  No acute distress.  Does not endorse blurry vision at this time    MDM: 83-year-old female presenting for evaluation of blurred vision.  She does not have any continued blurry vision on my evaluation.  She has otherwise no focal neurological deficits and current NIH of scale score is 0.  Code stroke was called upon her arrival as her symptoms could be concerning for amaurosis fugax.  Ultimately CT head, CTA is unremarkable for large vessel occlusion she is not a candidate for TNK due to history of anticoagulation.  She does have findings of acute kidney injury with creatinine elevated to 1.6 and has been started on IV fluids.  Patient will require admission for further evaluation, neurology evaluation, MRI.  Family is agreeable with this plan.      I personally saw the patient and independently provided 15 minutes of non-concurrent critical care out of the total shared critical care time provided.     EKG  The Ekg interpreted by myself in the emergency department in the absence of a cardiologist.  normal sinus rhythm with a rate of 68  Axis is   Normal  QTc is  within an acceptable range  Intervals and Durations are unremarkable.      No specific ST-T wave changes appreciated.  No evidence of acute 
Baseline  Level of Consciousness (1a): Alert  LOC Questions (1b): Answers both correctly  LOC Commands (1c): Performs both tasks correctly  Best Gaze (2): Normal  Visual (3): No visual loss  Facial Palsy (4): Normal symmetrical movement  Motor Arm, Left (5a): No drift  Motor Arm, Right (5b): No drift  Motor Leg, Left (6a): No drift  Motor Leg, Right (6b): No drift  Limb Ataxia (7): Absent  Sensory (8): Normal  Best Language (9): No aphasia  Dysarthria (10): Normal  Extinction and Inattention (11): No abnormality  Total: 0  [place] Coma Scale  Eye Opening: Spontaneous  Best Verbal Response: Confused  Best Motor Response: Obeys commands  Marie Coma Scale Score: 14           CIWA Assessment  BP: (!) 143/83  Pulse: 77          REVIEW OF SYSTEMS  Positives and Pertinent Negatives as per HPI.    PHYSICAL EXAM  BP (!) 143/83   Pulse 77   Temp 98.2 °F (36.8 °C) (Oral)   Resp 18   Ht 1.626 m (5' 4\")   Wt 81.5 kg (179 lb 10.8 oz)   SpO2 96%   BMI 30.84 kg/m²     GENERAL APPEARANCE: Awake and alert. Cooperative.  No acute distress.  HEAD: Normocephalic. Atraumatic.  EYES:  EOM's grossly intact.   ENT: Mucous membranes are moist.   NECK: Supple.  No JVD.  No tracheal tenderness or deviation.  No crepitus.  HEART: RRR.  No chest wall tenderness. No murmurs, rubs, or gallops.  LUNGS: CTAB.  No wheezing, rhonchi or rails. Respirations unlabored. Good air exchange.   ABDOMEN: Soft. Non-distended. Non-tender.  No midline pulsatile mass.  EXTREMITIES: No peripheral edema. Moves all extremities equally. All extremities neurovascularly intact.   SKIN: Warm and dry. No acute rashes.   NEUROLOGICAL: Alert and oriented. No gross facial drooping. Strength 5/5, sensation intact.  Negative finger-nose.  Negative heel-to-shin.  No pronator drift.  Negative test of skew.  Negative Romberg's.  Patient complaining of bilateral blurry vision.  Attempted to ambulate her although did not have cane.  Gait appears baseline.  NIHSS of 3 based

## 2024-04-30 NOTE — H&P
lobe measures up to 1.1 cm which is stable. ORBITS: The visualized portion of the orbits demonstrate no acute abnormality. SINUSES: The visualized paranasal sinuses and mastoid air cells demonstrate no acute abnormality. SOFT TISSUES/SKULL:  No acute abnormality of the visualized skull or soft tissues.     1. No acute intracranial findings. 2. Volume loss and chronic microvascular ischemic changes. 3. Stable 1.1 cm extra-axial mass in the left frontal region which likely represents a meningioma.       Personally reviewed Lab Studies, Imaging    Electronically signed by Dia Thomas MD on 4/30/2024 at 6:12 PM

## 2024-04-30 NOTE — TELEPHONE ENCOUNTER
EPI    Pt daughter called requesting advise. Her mother was complaining of dizziness and not feeling quite right. Her BP was 100/50 when daughter took it. Pt also having SOB and vision disturbances.    Daughter advised by MA to take pt to the ED given symptoms.

## 2024-05-01 ENCOUNTER — APPOINTMENT (OUTPATIENT)
Dept: MRI IMAGING | Age: 84
End: 2024-05-01
Payer: MEDICARE

## 2024-05-01 VITALS
HEART RATE: 77 BPM | SYSTOLIC BLOOD PRESSURE: 143 MMHG | BODY MASS INDEX: 30.67 KG/M2 | TEMPERATURE: 98.2 F | DIASTOLIC BLOOD PRESSURE: 83 MMHG | OXYGEN SATURATION: 96 % | RESPIRATION RATE: 18 BRPM | HEIGHT: 64 IN | WEIGHT: 179.68 LBS

## 2024-05-01 LAB
ANION GAP SERPL CALCULATED.3IONS-SCNC: 10 MMOL/L (ref 3–16)
BUN SERPL-MCNC: 25 MG/DL (ref 7–20)
CALCIUM SERPL-MCNC: 8.6 MG/DL (ref 8.3–10.6)
CHLORIDE SERPL-SCNC: 105 MMOL/L (ref 99–110)
CO2 SERPL-SCNC: 27 MMOL/L (ref 21–32)
CREAT SERPL-MCNC: 1.2 MG/DL (ref 0.6–1.2)
DEPRECATED RDW RBC AUTO: 13.4 % (ref 12.4–15.4)
EKG ATRIAL RATE: 68 BPM
EKG DIAGNOSIS: NORMAL
EKG P AXIS: 99 DEGREES
EKG P-R INTERVAL: 170 MS
EKG Q-T INTERVAL: 420 MS
EKG QRS DURATION: 92 MS
EKG QTC CALCULATION (BAZETT): 446 MS
EKG R AXIS: 15 DEGREES
EKG T AXIS: 83 DEGREES
EKG VENTRICULAR RATE: 68 BPM
GFR SERPLBLD CREATININE-BSD FMLA CKD-EPI: 45 ML/MIN/{1.73_M2}
GLUCOSE SERPL-MCNC: 93 MG/DL (ref 70–99)
HCT VFR BLD AUTO: 39.4 % (ref 36–48)
HGB BLD-MCNC: 13.2 G/DL (ref 12–16)
MAGNESIUM SERPL-MCNC: 2.3 MG/DL (ref 1.8–2.4)
MCH RBC QN AUTO: 31.2 PG (ref 26–34)
MCHC RBC AUTO-ENTMCNC: 33.4 G/DL (ref 31–36)
MCV RBC AUTO: 93.3 FL (ref 80–100)
PHOSPHATE SERPL-MCNC: 3.3 MG/DL (ref 2.5–4.9)
PLATELET # BLD AUTO: 257 K/UL (ref 135–450)
PMV BLD AUTO: 8 FL (ref 5–10.5)
POTASSIUM SERPL-SCNC: 4.6 MMOL/L (ref 3.5–5.1)
RBC # BLD AUTO: 4.22 M/UL (ref 4–5.2)
SODIUM SERPL-SCNC: 142 MMOL/L (ref 136–145)
WBC # BLD AUTO: 7.6 K/UL (ref 4–11)

## 2024-05-01 PROCEDURE — 6370000000 HC RX 637 (ALT 250 FOR IP): Performed by: STUDENT IN AN ORGANIZED HEALTH CARE EDUCATION/TRAINING PROGRAM

## 2024-05-01 PROCEDURE — 84100 ASSAY OF PHOSPHORUS: CPT

## 2024-05-01 PROCEDURE — 97116 GAIT TRAINING THERAPY: CPT

## 2024-05-01 PROCEDURE — 83735 ASSAY OF MAGNESIUM: CPT

## 2024-05-01 PROCEDURE — 96361 HYDRATE IV INFUSION ADD-ON: CPT

## 2024-05-01 PROCEDURE — 85027 COMPLETE CBC AUTOMATED: CPT

## 2024-05-01 PROCEDURE — 97535 SELF CARE MNGMENT TRAINING: CPT

## 2024-05-01 PROCEDURE — 70551 MRI BRAIN STEM W/O DYE: CPT

## 2024-05-01 PROCEDURE — 36415 COLL VENOUS BLD VENIPUNCTURE: CPT

## 2024-05-01 PROCEDURE — G0378 HOSPITAL OBSERVATION PER HR: HCPCS

## 2024-05-01 PROCEDURE — 93010 ELECTROCARDIOGRAM REPORT: CPT | Performed by: INTERNAL MEDICINE

## 2024-05-01 PROCEDURE — 80048 BASIC METABOLIC PNL TOTAL CA: CPT

## 2024-05-01 PROCEDURE — 97166 OT EVAL MOD COMPLEX 45 MIN: CPT

## 2024-05-01 PROCEDURE — 97161 PT EVAL LOW COMPLEX 20 MIN: CPT

## 2024-05-01 PROCEDURE — 6370000000 HC RX 637 (ALT 250 FOR IP)

## 2024-05-01 RX ORDER — ASPIRIN 81 MG/1
81 TABLET, CHEWABLE ORAL DAILY
Qty: 30 TABLET | Refills: 3 | Status: SHIPPED | OUTPATIENT
Start: 2024-05-01

## 2024-05-01 RX ADMIN — DULOXETINE HYDROCHLORIDE 30 MG: 30 CAPSULE, DELAYED RELEASE ORAL at 10:58

## 2024-05-01 RX ADMIN — APIXABAN 5 MG: 5 TABLET, FILM COATED ORAL at 10:58

## 2024-05-01 RX ADMIN — ATORVASTATIN CALCIUM 10 MG: 10 TABLET, FILM COATED ORAL at 10:58

## 2024-05-01 RX ADMIN — ASPIRIN 81 MG 81 MG: 81 TABLET ORAL at 10:58

## 2024-05-01 RX ADMIN — MEMANTINE 10 MG: 5 TABLET ORAL at 10:58

## 2024-05-01 RX ADMIN — DILTIAZEM HYDROCHLORIDE 180 MG: 180 CAPSULE, COATED, EXTENDED RELEASE ORAL at 10:58

## 2024-05-01 RX ADMIN — LOSARTAN POTASSIUM 100 MG: 100 TABLET, FILM COATED ORAL at 10:58

## 2024-05-01 ASSESSMENT — PAIN SCALES - GENERAL: PAINLEVEL_OUTOF10: 0

## 2024-05-01 NOTE — PLAN OF CARE
Problem: Safety - Adult  Goal: Free from fall injury  Outcome: Progressing     Problem: ABCDS Injury Assessment  Goal: Absence of physical injury  Outcome: Progressing     Problem: Neurosensory - Adult  Goal: Achieves stable or improved neurological status  Outcome: Progressing  Goal: Remains free of injury related to seizures activity  Outcome: Progressing     Problem: Respiratory - Adult  Goal: Achieves optimal ventilation and oxygenation  Outcome: Progressing     Problem: Cardiovascular - Adult  Goal: Maintains optimal cardiac output and hemodynamic stability  Outcome: Progressing     Problem: Skin/Tissue Integrity - Adult  Goal: Skin integrity remains intact  Outcome: Progressing     Problem: Musculoskeletal - Adult  Goal: Return mobility to safest level of function  Outcome: Progressing     Problem: Gastrointestinal - Adult  Goal: Minimal or absence of nausea and vomiting  Outcome: Progressing     Problem: Genitourinary - Adult  Goal: Absence of urinary retention  Outcome: Progressing     Problem: Infection - Adult  Goal: Absence of infection at discharge  Outcome: Progressing  Goal: Absence of infection during hospitalization  Outcome: Progressing     Problem: Metabolic/Fluid and Electrolytes - Adult  Goal: Electrolytes maintained within normal limits  Outcome: Progressing     Problem: Hematologic - Adult  Goal: Maintains hematologic stability  Outcome: Progressing

## 2024-05-01 NOTE — PLAN OF CARE
Problem: Safety - Adult  Goal: Free from fall injury  5/1/2024 1617 by FRANSISCO MELGOZA  Outcome: Adequate for Discharge  5/1/2024 1350 by FRANSISCO MELGOZA  Outcome: Progressing     Problem: ABCDS Injury Assessment  Goal: Absence of physical injury  5/1/2024 1617 by FRANSISCO MELGOZA  Outcome: Adequate for Discharge  5/1/2024 1350 by FRANSISCO MELGOZA  Outcome: Progressing     Problem: Neurosensory - Adult  Goal: Achieves stable or improved neurological status  5/1/2024 1617 by FRANSISCO MELGOZA  Outcome: Adequate for Discharge  5/1/2024 1350 by FRANSISCO MELGOZA  Outcome: Progressing  Goal: Remains free of injury related to seizures activity  5/1/2024 1617 by FRANSISCO MELGOZA  Outcome: Adequate for Discharge  5/1/2024 1350 by FRANSISCO MELGOZA  Outcome: Progressing     Problem: Respiratory - Adult  Goal: Achieves optimal ventilation and oxygenation  5/1/2024 1617 by FRANSISCO MELGOZA  Outcome: Adequate for Discharge  5/1/2024 1350 by FRANSISCO MELGOZA  Outcome: Progressing     Problem: Cardiovascular - Adult  Goal: Maintains optimal cardiac output and hemodynamic stability  5/1/2024 1617 by FRANSISCO MELGOZA  Outcome: Adequate for Discharge  5/1/2024 1350 by FRANSISCO MELGOZA  Outcome: Progressing     Problem: Skin/Tissue Integrity - Adult  Goal: Skin integrity remains intact  5/1/2024 1617 by FRANSISCO MELGOZA  Outcome: Adequate for Discharge  5/1/2024 1350 by FRANSISCO MELGOZA  Outcome: Progressing     Problem: Musculoskeletal - Adult  Goal: Return mobility to safest level of function  5/1/2024 1617 by FRANSISCO MELGOZA  Outcome: Adequate for Discharge  5/1/2024 1350 by FRANSISCO MELGOZA  Outcome: Progressing     Problem: Gastrointestinal - Adult  Goal: Minimal or absence of nausea and vomiting  5/1/2024 1617 by FRANSISCO MELGOZA  Outcome: Adequate for Discharge  5/1/2024 1350 by FRANSISCO MELGOZA  Outcome: Progressing     Problem: Genitourinary - Adult  Goal: Absence of urinary retention  5/1/2024 1617 by RHONA

## 2024-05-01 NOTE — PROGRESS NOTES
Patient IV removed, no complications. Tele removed. Discharge instructions given and all questions answered. Patient wheeled to main entrance with family for discharge   
TODAY'S DATE:  5/1/2024      Current NIHSS: NIHSS not ordered at this time. Pt unable to participate due to cognitive deficits at this time.      Discussed personal risk factors for Stroke/TIA with patient/family, and ways to reduce the risk for a recurrent stroke.     Patient's personal risk factors which were identified are:   []   Alcohol Abuse: check with your physician before any alcohol consumption.   []   Atrial fibrillation: may cause blood clots  []   Drug Abuse: Seek help, talk with your doctor  [x]   Clotting Disorder  [x]   Diabetes  [x]   Family history of stroke or heart disease  [x]   High Blood Pressure/Hypertension: work with your physician  [x]   High cholesterol: monitor cholesterol levels with your physician  []   Overweight/Obesity: work with your physician for your ideal body weight  [x]   Physical Inactivity: get regular exercise as directed by your physician  []   Personal history of previous TIA or stroke  [x]   Poor Diet: decrease salt (sodium) in your diet, follow diet directed by physician  []   Smoking: stop smoking      Reviewed the Following Education with Patient and/or Family:   - Signs and Symptoms of Stroke  - Personal risk factors   - How to activate EMS (911)   - Importance of Follow Up Appointments at Discharge   - Importance of Compliance with Medications Prescribed at Discharge  - Available community resources and stroke advocacy groups if needed    Patient and/or family member: verbalized understanding.     Stroke Education booklet given to patient/family (or verified, if given already), which reviews above information. yes         Electronically signed by Malcom Trejo LPN on 5/1/2024 at 6:07 AM        
continue to benefit from continued skilled OT services at this time.  Prognosis: Good  Decision Making: Medium Complexity  REQUIRES OT FOLLOW-UP: Yes  Activity Tolerance  Activity Tolerance: Patient Tolerated treatment well        Plan   Occupational Therapy Plan  Times Per Week: 2-3x  Current Treatment Recommendations: Strengthening, Balance training, Functional mobility training, Endurance training, Safety education & training, Self-Care / ADL     Restrictions  Restrictions/Precautions  Restrictions/Precautions: Fall Risk, Up as Tolerated    Subjective   Subjective  Subjective: Pt supine in bed at start of session. Agreeable to OT eval and tx. Denies pain     Social/Functional History  Social/Functional History  Lives With: Daughter  Type of Home: House  Home Layout: Two level, Performs ADL's on one level, Able to Live on Main level with bedroom/bathroom  Home Access: Stairs to enter with rails  Entrance Stairs - Number of Steps: 3 DELFINA through garage, 1 DELFINA through front door  Entrance Stairs - Rails: Both  Bathroom Shower/Tub: Shower chair without back, Walk-in shower  Bathroom Toilet: Standard (with safety frame)  Bathroom Equipment: Grab bars in shower, Shower chair  Bathroom Accessibility: Walker accessible  Home Equipment: Rollator, Cane  ADL Assistance: Needs assistance  Bath: Supervision  Dressing: Independent  Grooming: Independent  Feeding: Independent  Toileting: Independent  Homemaking Responsibilities: No  Ambulation Assistance: Independent (with rollator)  Transfer Assistance: Independent  Active : Yes  Mode of Transportation: Car  Leisure & Hobbies: great grandchildren, driving       Objective   Temp: 97.9 °F (36.6 °C)  Pulse: 77  Heart Rate Source: Monitor  Respirations: 17  SpO2: 94 %  O2 Device: None (Room air)  BP: (!) 134/91  MAP (Calculated): 105  BP Location: Left upper arm  BP Method: Automatic             Safety Devices  Type of Devices: Call light within reach;Gait belt;Left in 
(HOB elevated, towards L)  Sit to Supine:  (in chair at end of session)    Balance  Sitting: Intact  Standing: Impaired  Standing - Static: Constant support;Good (RW)  Standing - Dynamic: Constant support;Fair (RW)    Transfer Training  Transfer Training: Yes  Interventions: Safety awareness training;Verbal cues  Sit to Stand: Contact-guard assistance;Adaptive equipment;Additional time;Stand-by assistance (Initially CGA, progressing to SBA)  Stand to Sit: Contact-guard assistance;Adaptive equipment;Additional time;Stand-by assistance (VC for positioning)  Bed to Chair: Minimum assistance;Contact-guard assistance;Adaptive equipment;Additional time (bed > toilet > sink side > chair)  Toilet Transfer: Contact-guard assistance;Adaptive equipment;Additional time (with grab bar use)    Gait  Gait Training: Yes  Overall Level of Assistance: Contact-guard assistance;Adaptive equipment;Additional time;Assist X1  Distance (ft):  (150 ft x2)  Assistive Device: Gait belt;Walker, rolling  Interventions: Weight shifting training/pressure relief;Safety awareness training;Verbal cues  Base of Support: Widened  Speed/Mesha: Accelerated (VC to decrease speed to improve safety)  Step Length: Left shortened;Right shortened  Gait Abnormalities: Trunk sway increased;Decreased step clearance (Pt ambulates with WBOS and increased sway. Decreased heel-to-toe gait pattern and mild fwd flexion/dwd gaze. Intermittent VC for improved RW proximity)  Rail Use: Both  Stairs - Level of Assistance: Contact-guard assistance;Additional time (pt ascends/descends with reciprocal pattern and increased time)  Number of Stairs Trained: 4     AM-PAC - Mobility  AM-PAC Basic Mobility - Inpatient   How much help is needed turning from your back to your side while in a flat bed without using bedrails?: None  How much help is needed moving from lying on your back to sitting on the side of a flat bed without using bedrails?: None  How much help is needed 
labs were ordered    Medications:  Personally reviewed in detail in conjunction w/ labs as documented for evidence of drug toxicity.     Infusion Medications    lactated ringers IV soln 100 mL/hr at 04/30/24 2013    sodium chloride       Scheduled Medications    apixaban  5 mg Oral BID    dilTIAZem  180 mg Oral Daily    atorvastatin  10 mg Oral Daily    DULoxetine  30 mg Oral Daily    losartan  100 mg Oral Daily    memantine  10 mg Oral BID    aspirin  81 mg Oral Daily    sodium chloride flush  5-40 mL IntraVENous 2 times per day     PRN Meds: sodium chloride flush, sodium chloride, ondansetron **OR** ondansetron, polyethylene glycol, acetaminophen **OR** acetaminophen     Labs:  Personally reviewed and interpreted for clinical significance.     Recent Labs     04/30/24  1627 05/01/24  0546   WBC 9.1 7.6   HGB 15.3 13.2   HCT 46.2 39.4    257     Recent Labs     04/30/24  1627 05/01/24  0546    142   K 4.5 4.6   CL 98* 105   CO2 26 27   BUN 29* 25*   CREATININE 1.6* 1.2   CALCIUM 9.8 8.6   MG  --  2.30   PHOS  --  3.3     Recent Labs     04/30/24  1627 04/30/24  1949   TROPHS 17* 14     No results for input(s): \"LABA1C\" in the last 72 hours.  Recent Labs     04/30/24  1627   AST 19   ALT 15   BILITOT 0.8   ALKPHOS 91     Recent Labs     04/30/24  1627   INR 1.33*       Urine Cultures: No results found for: \"LABURIN\"  Blood Cultures: No results found for: \"BC\"  No results found for: \"BLOODCULT2\"  Organism: No results found for: \"ORG\"      Piedad Raymundo, APRN - CNP

## 2024-05-01 NOTE — PLAN OF CARE
Problem: Safety - Adult  Goal: Free from fall injury  Outcome: Progressing     Problem: ABCDS Injury Assessment  Goal: Absence of physical injury  Outcome: Progressing     Problem: Neurosensory - Adult  Goal: Achieves stable or improved neurological status  Outcome: Progressing  Goal: Remains free of injury related to seizures activity  Outcome: Progressing     Problem: Respiratory - Adult  Goal: Achieves optimal ventilation and oxygenation  Outcome: Progressing     Problem: Cardiovascular - Adult  Goal: Maintains optimal cardiac output and hemodynamic stability  Outcome: Progressing     Problem: Skin/Tissue Integrity - Adult  Goal: Skin integrity remains intact  Outcome: Progressing     Problem: Musculoskeletal - Adult  Goal: Return mobility to safest level of function  Outcome: Progressing     Problem: Gastrointestinal - Adult  Goal: Minimal or absence of nausea and vomiting  Outcome: Progressing     Problem: Genitourinary - Adult  Goal: Absence of urinary retention  Outcome: Progressing     Problem: Infection - Adult  Goal: Absence of infection at discharge  Outcome: Progressing  Goal: Absence of infection during hospitalization  Outcome: Progressing     Problem: Metabolic/Fluid and Electrolytes - Adult  Goal: Electrolytes maintained within normal limits  Outcome: Progressing     Problem: Hematologic - Adult  Goal: Maintains hematologic stability  Outcome: Progressing     Problem: Discharge Planning  Goal: Discharge to home or other facility with appropriate resources  Outcome: Progressing

## 2024-05-01 NOTE — FLOWSHEET NOTE
05/01/24 1103   Assessment   Charting Type Shift assessment   Psychosocial   Psychosocial (WDL) X   Patient Behaviors Other (comment)  (confused)   Neurological   Neuro (WDL) X   Level of Consciousness 0   Orientation Level Oriented to person;Oriented to place;Oriented to time;Disoriented to situation;Oriented/disoriented at times   Cognition Short term memory loss;Follows commands   Speech Clear   R Pupil Size (mm) 2   R Pupil Shape Round   R Pupil Reaction Brisk   L Pupil Size (mm) 2   L Pupil Shape Round   L Pupil Reaction Brisk   R Hand  Strong   L Hand  Strong   R Foot Dorsiflexion Strong   L Foot Dorsiflexion Strong   R Foot Plantar Flexion Strong   L Foot Plantar Flexion Strong   RUE Motor Response Responds to command;Normal flexion;Normal extension   RUE Sensation  Full sensation;No numbness;No pain;No tingling   LUE Motor Response Responds to command;Normal flexion;Normal extension   LUE Sensation  Full sensation;No numbness;No pain;No tingling   RLE Motor Response Responds to command;Normal flexion;Normal extension   RLE Sensation  Full sensation;No numbness;No pain;No tingling   LLE Motor Response Responds to command;Normal flexion;Normal extension   LLE Sensation  Full sensation;No numbness;No pain;No tingling   Cough Reflex Present   Marie Coma Scale   Eye Opening 4   Best Verbal Response 4   Best Motor Response 6   [place] Coma Scale Score 14   NIHSS Stroke Scale   NIHSS Stroke Scale Assessed No   HEENT (Head, Ears, Eyes, Nose, & Throat)   HEENT (WDL) X   Right Eye Impaired vision;Glasses   Left Eye Impaired vision;Glasses   Teeth Partial plate upper   Respiratory   Respiratory (WDL) WDL   Respiratory Interventions H.O.B. elevated   Cardiac   Cardiac (WDL) WDL   Cardiac Regularity Regular   Heart Sounds S1, S2   Cardiac Monitor   Cardiac/Telemetry Monitor On Portable telemetry pack applied   Alarm Audible Centralized cardiac monitoring   Alarms Set Centralized cardiac monitoring

## 2024-05-01 NOTE — CARE COORDINATION
and if so, who? Yes (dtr)  Plans to Return to Present Housing: Yes  Other Identified Issues/Barriers to RETURNING to current housing: None at this time   Potential Assistance needed at discharge: N/A            Potential DME:    Patient expects to discharge to: House  Plan for transportation at discharge: Family    Financial    Payor: [place] MEDICARE / Plan: [employer] MEDIBLUE ESSENTIAL/PLUS / Product Type: *No Product type* /     Does insurance require precert for SNF: Yes    Potential assistance Purchasing Medications: No  Meds-to-Beds request: Yes      [facility] [id number] - [place], [place] - [address] [phone] - F [phone]  [address] [address]  Phone: [phone] Fax: [phone]      Notes:    Factors facilitating achievement of predicted outcomes: Family support, Caregiver support, Motivated, Cooperative, Pleasant, Sense of humor, and Has needed Durable Medical Equipment at home    Barriers to discharge: n/a    Additional Case Management Notes: IPTA for personal care and assist for housework and cooking. Pt lives w/dtr in a ranch style home w/use of a rollator. Family is very supportive and can provide transport. Pt and family declined needs.     The Plan for Transition of Care is related to the following treatment goals of Blurred vision [H53.8]    IF APPLICABLE: The Patient and/or patient representative Crystal and her family were provided with a choice of provider and agrees with the discharge plan. Freedom of choice list with basic dialogue that supports the patient's individualized plan of care/goals and shares the quality data associated with the providers was provided to:     Patient Representative Name:       The Patient and/or Patient Representative Agree with the Discharge Plan?      ANA M LACY RN  Case Management Department

## 2024-05-01 NOTE — DISCHARGE SUMMARY
Hospital Medicine Discharge Summary    Patient: Crystal Pandey   : 1940     Admit Date: 2024   Discharge Date: 24  Disposition:  [x]Home   []HHC  []SNF  []ECF  []Acute Rehab  []LTAC  []Hospice  Code status:  [x]Full  []DNR/CCA  []Limited (DNR/CCA with Do Not Intubate)  []DNRCC  Condition at Discharge: Stable  Primary Care Provider: Alexa Ortiz MD    Admitting Provider: No admitting provider for patient encounter.  Discharge Provider: Piedad Raymundo, APRN - CNP     Discharge Diagnoses:      Active Hospital Problems    Diagnosis     Blurred vision [H53.8]        Presenting Admission History: \"83-year-old female with past medical history of atrial flutter nonrheumatic mitral valve stenosis hypertension hyperlipidemia presented to the hospital from the PCP office because of concern for stroke as the patient is off balance associated symptoms of blurry vision last known well was 153 today according to her daughter. Patient is still complaining of blurry vision at the time of the triage in the ED but the patient is unable to pinpoint which is the main reason. In the ED patient was found to be vitally stable. INR 1.33 WBC count normal troponin of 17 creatinine of 1.6. Code stroke was called chest x-ray was found to be negative NIH score calculated to be 0. To be admitted to the hospital for the stroke rule out CT scan of the head along with CTA of the head showed no acute intracranial finding but there is a stable 1.1 cm extra-axial mass in the left frontal region which could likely be meningioma. The location does not correlate with the patient's symptoms. TIA rule out \"     Assessment/Plan:       Current Principal Problem:  Blurred vision     Blurred vision.  Concern for TIA vs CVA vs atypical migraine.  CT head: no acute intracranial abnormality; 1.1cm extra axial meningioma (stable).  CTA head/neck: no LVO.  MRI brain: no acute infarct.  Continue ASA, statin, eliquis     Acute kidney injury,

## 2024-05-02 ENCOUNTER — CARE COORDINATION (OUTPATIENT)
Dept: CASE MANAGEMENT | Age: 84
End: 2024-05-02

## 2024-05-02 DIAGNOSIS — H53.8 BLURRED VISION: Primary | ICD-10-CM

## 2024-05-02 PROCEDURE — 1111F DSCHRG MED/CURRENT MED MERGE: CPT | Performed by: FAMILY MEDICINE

## 2024-05-02 NOTE — TELEPHONE ENCOUNTER
Dtr reached and she wanted to know if it is needed to be seen sooner? She is scheduled for 6/12/2024 is it ok to add hos visit to that appt?

## 2024-05-02 NOTE — CARE COORDINATION
Nurse provided contact information.  Plan for follow-up call in 3-5 days based on severity of symptoms and risk factors.  Plan for next call: self management-     Isabel Dubon RN

## 2024-05-06 ENCOUNTER — CARE COORDINATION (OUTPATIENT)
Dept: CASE MANAGEMENT | Age: 84
End: 2024-05-06

## 2024-05-06 NOTE — CARE COORDINATION
Care Transitions Follow Up Call    Patient Current Location:  Home: [address] [place] [address] [place] [address]    Care Transition Nurse contacted the family by telephone to follow up after admission.  Verified name and  with family as identifiers.    Patient: Crystal Pandey  Patient : 1940   MRN: [id number]  Reason for Admission: blurred vision  PMH atrial flutter  Discharge Date: 24 RARS: No data recorded    Needs to be reviewed by the provider   Additional needs identified to be addressed with provider: No  none         Method of communication with provider: none.    Spoke to daughter, Lashanda- HIPAA verified. Pleasant and agreeable to transition call. Lashanda stated she lives with patient and PCG for patient. Reviewed last call and Lashanda stated that as the day went on (last week) and she settled back into routine, patient started remembering more of the hospitalization. Patient is taking all medication as directed.   Noted TCM visit scheduled for later this week and updated in system. Denied any acute needs at present time.  Agreeable to f/u calls.  Educated on the use of urgent care or physician’s 24 hr access line if assistance is needed after hours.    Addressed changes since last contact:  none  Discussed follow-up appointments. If no appointment was previously scheduled, appointment scheduling offered: Yes.   Is follow up appointment scheduled within 7 days of discharge? Yes.    Follow Up  Future Appointments   Date Time Provider Department Center   2024  3:45 PM Alexa Ortiz MD [facility] [place] [facility]   2024 10:00 AM Alexa Ortiz MD [facility] [place] [facility]     External follow up appointment(s):     Care Transition Nurse reviewed medical action plan and red flags with family and discussed any barriers to care and/or understanding of plan of care after discharge. Discussed appropriate site of care based on symptoms and resources available to patient including: PCP  Specialist  When to

## 2024-05-08 ENCOUNTER — OFFICE VISIT (OUTPATIENT)
Dept: FAMILY MEDICINE CLINIC | Age: 84
End: 2024-05-08

## 2024-05-08 VITALS
HEART RATE: 68 BPM | DIASTOLIC BLOOD PRESSURE: 68 MMHG | WEIGHT: 170 LBS | SYSTOLIC BLOOD PRESSURE: 138 MMHG | BODY MASS INDEX: 29.02 KG/M2 | HEIGHT: 64 IN | OXYGEN SATURATION: 98 %

## 2024-05-08 DIAGNOSIS — D32.9 MENINGIOMA (HCC): ICD-10-CM

## 2024-05-08 DIAGNOSIS — F32.5 DEPRESSION, MAJOR, SINGLE EPISODE, COMPLETE REMISSION (HCC): ICD-10-CM

## 2024-05-08 DIAGNOSIS — F02.80 ALZHEIMER DISEASE (HCC): ICD-10-CM

## 2024-05-08 DIAGNOSIS — G30.9 ALZHEIMER DISEASE (HCC): ICD-10-CM

## 2024-05-08 DIAGNOSIS — Z09 HOSPITAL DISCHARGE FOLLOW-UP: Primary | ICD-10-CM

## 2024-05-08 DIAGNOSIS — N18.31 CKD STAGE 3A, GFR 45-59 ML/MIN (HCC): ICD-10-CM

## 2024-05-08 DIAGNOSIS — R68.89 HEAT SENSITIVITY: ICD-10-CM

## 2024-05-08 NOTE — PROGRESS NOTES
Post-Discharge Transitional Care  Follow Up      Crystal Pandey   YOB: 1940    Date of Office Visit:  5/8/2024  Date of Hospital Admission: 4/30/24  Date of Hospital Discharge: 5/1/24  Risk of hospital readmission (high >=14%. Medium >=10%) :No data recorded    Care management risk score Rising risk (score 2-5) and Complex Care (Scores >=6): No Risk Score On File     Non face to face  following discharge, date last encounter closed (first attempt may have been earlier): 05/02/2024    Call initiated 2 business days of discharge: Yes    ASSESSMENT/PLAN:   Hospital discharge follow-up  -     VT DISCHARGE MEDS RECONCILED W/ CURRENT OUTPATIENT MED LIST  Heat sensitivity  Alzheimer disease (HCC)  Depression, major, single episode, complete remission (HCC)  Meningioma (HCC)  CKD stage 3a, GFR 45-59 ml/min (HCC)      Medical Decision Making: moderate complexity  No follow-ups on file.    On this date 5/8/2024 I have spent 30 minutes reviewing previous notes, test results and face to face with the patient discussing the diagnosis and importance of compliance with the treatment plan as well as documenting on the day of the visit.       Crystal was seen today for follow-up from hospital.    Diagnoses and all orders for this visit:    Hospital discharge follow-up  -     VT DISCHARGE MEDS RECONCILED W/ CURRENT OUTPATIENT MED LIST    Heat sensitivity   Possibly 2/2 aricept, uncommon TRACEY. To d/c.    Alzheimer disease (HCC)   Consider exelon 1.5 bid at f/u appt.    Depression, major, single episode, complete remission (HCC)   Cont low dose cymbalta but consider wean if heat intol/sweating persists.    Meningioma (HCC)   Noted in 2020, assurance given.    CKD stage 3a, GFR 45-59 ml/min (HCC)   Renal fx nl for age, to follow. Avoid nsaids.        Subjective:   HPI:  Follow up of Hospital problems/diagnosis(es): Blurred vision    Inpatient course: Discharge summary reviewed- see chart.    Interval history/Current

## 2024-05-12 PROBLEM — G30.9 ALZHEIMER DISEASE (HCC): Status: ACTIVE | Noted: 2024-05-12

## 2024-05-12 PROBLEM — G30.9 ALZHEIMER'S DISEASE, UNSPECIFIED (CODE) (HCC): Status: ACTIVE | Noted: 2024-05-12

## 2024-05-12 PROBLEM — F02.80 ALZHEIMER DISEASE (HCC): Status: ACTIVE | Noted: 2024-05-12

## 2024-05-12 PROBLEM — F32.5 DEPRESSION, MAJOR, SINGLE EPISODE, COMPLETE REMISSION (HCC): Status: ACTIVE | Noted: 2024-05-12

## 2024-05-12 PROBLEM — J96.01 ACUTE RESPIRATORY FAILURE WITH HYPOXIA (HCC): Status: RESOLVED | Noted: 2023-01-20 | Resolved: 2024-05-12

## 2024-05-13 NOTE — PROGRESS NOTES
Post-Discharge Transitional Care  Follow Up      Crystal Pandey   YOB: 1940    Date of Office Visit:  5/8/2024  Date of Hospital Admission: 4/30/24  Date of Hospital Discharge: 5/1/24  Risk of hospital readmission (high >=14%. Medium >=10%) :No data recorded    Care management risk score Rising risk (score 2-5) and Complex Care (Scores >=6): No Risk Score On File     Non face to face  following discharge, date last encounter closed (first attempt may have been earlier): 05/02/2024    Call initiated 2 business days of discharge: Yes    ASSESSMENT/PLAN:   Hospital discharge follow-up  -     MD DISCHARGE MEDS RECONCILED W/ CURRENT OUTPATIENT MED LIST  Heat sensitivity Though uncommon, sx's may be 2/2 aricept which pt will d/c.  Alzheimer disease (HCC) Consider exelon 1.5 mg bid at f/u appt if heat intol improves.  Depression, major, single episode, complete remission (HCC) Pt is doing well with low dose cymbalta. Heat intol does not correlate with cymbalta use though consider wean/d/c if sweating persists        Medical Decision Making: moderate complexity  No follow-ups on file.    On this date 5/8/2024 I have spent 30 minutes reviewing previous notes, test results and face to face with the patient discussing the diagnosis and importance of compliance with the treatment plan as well as documenting on the day of the visit.       Subjective:   HPI:  Follow up of Hospital problems/diagnosis(es): Blurred vision    Inpatient course: Discharge summary reviewed- see chart.    Interval history/Current status: Pt \"wasn't feeling right\", had blurred vision at time of hosp admit. Pt was hot and sweaty after walking in house. Happened again when walking to BR in local restaurant.     Pt conts to be vulnerable to heat. Now avoids sitting outside. Vision returned to nl shortly after IVF in ED.     Meningioma 1.1 cm found 2020.     Cymbalta 20 was increased to 30 mg 1/2024.     Dtr Martha feels since hosp stay,

## 2024-06-11 ASSESSMENT — PATIENT HEALTH QUESTIONNAIRE - PHQ9
2. FEELING DOWN, DEPRESSED OR HOPELESS: NOT AT ALL
8. MOVING OR SPEAKING SO SLOWLY THAT OTHER PEOPLE COULD HAVE NOTICED. OR THE OPPOSITE, BEING SO FIGETY OR RESTLESS THAT YOU HAVE BEEN MOVING AROUND A LOT MORE THAN USUAL: NOT AT ALL
SUM OF ALL RESPONSES TO PHQ QUESTIONS 1-9: 3
SUM OF ALL RESPONSES TO PHQ QUESTIONS 1-9: 3
10. IF YOU CHECKED OFF ANY PROBLEMS, HOW DIFFICULT HAVE THESE PROBLEMS MADE IT FOR YOU TO DO YOUR WORK, TAKE CARE OF THINGS AT HOME, OR GET ALONG WITH OTHER PEOPLE: NOT DIFFICULT AT ALL
9. THOUGHTS THAT YOU WOULD BE BETTER OFF DEAD, OR OF HURTING YOURSELF: NOT AT ALL
1. LITTLE INTEREST OR PLEASURE IN DOING THINGS: NOT AT ALL
6. FEELING BAD ABOUT YOURSELF - OR THAT YOU ARE A FAILURE OR HAVE LET YOURSELF OR YOUR FAMILY DOWN: NOT AT ALL
4. FEELING TIRED OR HAVING LITTLE ENERGY: MORE THAN HALF THE DAYS
5. POOR APPETITE OR OVEREATING: NOT AT ALL
4. FEELING TIRED OR HAVING LITTLE ENERGY: MORE THAN HALF THE DAYS
1. LITTLE INTEREST OR PLEASURE IN DOING THINGS: NOT AT ALL
SUM OF ALL RESPONSES TO PHQ QUESTIONS 1-9: 3
5. POOR APPETITE OR OVEREATING: NOT AT ALL
SUM OF ALL RESPONSES TO PHQ QUESTIONS 1-9: 3
7. TROUBLE CONCENTRATING ON THINGS, SUCH AS READING THE NEWSPAPER OR WATCHING TELEVISION: SEVERAL DAYS
6. FEELING BAD ABOUT YOURSELF - OR THAT YOU ARE A FAILURE OR HAVE LET YOURSELF OR YOUR FAMILY DOWN: NOT AT ALL
2. FEELING DOWN, DEPRESSED OR HOPELESS: NOT AT ALL
7. TROUBLE CONCENTRATING ON THINGS, SUCH AS READING THE NEWSPAPER OR WATCHING TELEVISION: SEVERAL DAYS
3. TROUBLE FALLING OR STAYING ASLEEP: NOT AT ALL
SUM OF ALL RESPONSES TO PHQ QUESTIONS 1-9: 3
3. TROUBLE FALLING OR STAYING ASLEEP: NOT AT ALL
9. THOUGHTS THAT YOU WOULD BE BETTER OFF DEAD, OR OF HURTING YOURSELF: NOT AT ALL
8. MOVING OR SPEAKING SO SLOWLY THAT OTHER PEOPLE COULD HAVE NOTICED. OR THE OPPOSITE - BEING SO FIDGETY OR RESTLESS THAT YOU HAVE BEEN MOVING AROUND A LOT MORE THAN USUAL: NOT AT ALL
10. IF YOU CHECKED OFF ANY PROBLEMS, HOW DIFFICULT HAVE THESE PROBLEMS MADE IT FOR YOU TO DO YOUR WORK, TAKE CARE OF THINGS AT HOME, OR GET ALONG WITH OTHER PEOPLE: NOT DIFFICULT AT ALL
SUM OF ALL RESPONSES TO PHQ9 QUESTIONS 1 & 2: 0

## 2024-06-12 ENCOUNTER — OFFICE VISIT (OUTPATIENT)
Dept: FAMILY MEDICINE CLINIC | Age: 84
End: 2024-06-12
Payer: MEDICARE

## 2024-06-12 VITALS
DIASTOLIC BLOOD PRESSURE: 60 MMHG | WEIGHT: 170 LBS | BODY MASS INDEX: 29.02 KG/M2 | HEIGHT: 64 IN | HEART RATE: 94 BPM | SYSTOLIC BLOOD PRESSURE: 118 MMHG | OXYGEN SATURATION: 98 %

## 2024-06-12 DIAGNOSIS — R68.89 HEAT INTOLERANCE: ICD-10-CM

## 2024-06-12 DIAGNOSIS — G30.9 ALZHEIMER DISEASE (HCC): Primary | ICD-10-CM

## 2024-06-12 DIAGNOSIS — N18.31 CKD STAGE 3A, GFR 45-59 ML/MIN (HCC): ICD-10-CM

## 2024-06-12 DIAGNOSIS — F02.80 ALZHEIMER DISEASE (HCC): Primary | ICD-10-CM

## 2024-06-12 PROCEDURE — 3078F DIAST BP <80 MM HG: CPT | Performed by: FAMILY MEDICINE

## 2024-06-12 PROCEDURE — 1123F ACP DISCUSS/DSCN MKR DOCD: CPT | Performed by: FAMILY MEDICINE

## 2024-06-12 PROCEDURE — 3074F SYST BP LT 130 MM HG: CPT | Performed by: FAMILY MEDICINE

## 2024-06-12 PROCEDURE — 99214 OFFICE O/P EST MOD 30 MIN: CPT | Performed by: FAMILY MEDICINE

## 2024-06-12 RX ORDER — MEMANTINE HYDROCHLORIDE 5 MG/1
5 TABLET ORAL 2 TIMES DAILY
Qty: 180 TABLET | Refills: 1 | Status: SHIPPED | OUTPATIENT
Start: 2024-06-12 | End: 2024-06-13

## 2024-06-12 NOTE — PROGRESS NOTES
Assessment/Plan:    Crystal was seen today for hypertension.    Diagnoses and all orders for this visit:    Alzheimer disease (HCC)   Will request annual eval 4/2024 from Dr Reza.    CKD stage 3a, GFR 45-59 ml/min ([facility])   Repeat renal labs at f/u appt. Pt avoid nsaids as she is on eliquis.    Heat intolerance   Aricept was dc'd last visit. Heat intol is reportedly a rare TRACEY from rx. Pt is doing better though she has been avoiding sitting in sun.   Cont namenda and add exelone.    Other orders  -     Discontinue: memantine (NAMENDA) 5 MG tablet; Take 1 tablet by mouth 2 times daily        There are no Patient Instructions on file for this visit.   4/2024 dr reza call for   Minor degree executive fx    Patient: Crystal Pandey is a 84 y.o.female who presents today with the following Chief Complaint(s):  Chief Complaint   Patient presents with    Hypertension         HPI: Pt with AD(per neuropsych testing by Dr Juan Reza at [facility]), PAF, HLD, HTN, MR has been at baseline. Pt is brought in by dtr Martha.    Was hospitalized 5/2024 for heat sensitivity, unclear cause. It was speculated that aricept may be related as sweating began with taking med. Aricept was d/c'd 1 mo ago.     Pt has more mindful to drink water throughout ea day. Pt has been sitting in shade, avoiding sun.     Depression is controlled with low dose cymbalta.     Pt saw EPS 9/14/23 and Dr Mccabe d/c'd metoprolol and cont'd diltiazem, eliquis, as pt had no recurrence of AF. Denies palpitations.      Had annual neuropsych eval 4/2024 by Dr Juan Reza at [facility], results not found. Dtr states progression has been slow, minimal.      Current Outpatient Medications   Medication Sig Dispense Refill    aspirin 81 MG chewable tablet Take 1 tablet by mouth daily 30 tablet 3    DULoxetine (CYMBALTA) 30 MG extended release capsule Take 1 capsule by mouth daily      atorvastatin (LIPITOR) 10 MG tablet TAKE ONE

## 2024-06-13 DIAGNOSIS — F02.80 ALZHEIMER DISEASE (HCC): Primary | ICD-10-CM

## 2024-06-13 DIAGNOSIS — G30.9 ALZHEIMER DISEASE (HCC): Primary | ICD-10-CM

## 2024-06-13 RX ORDER — MEMANTINE HYDROCHLORIDE 10 MG/1
10 TABLET ORAL 2 TIMES DAILY
Qty: 180 TABLET | Refills: 3 | Status: SHIPPED | OUTPATIENT
Start: 2024-06-13

## 2024-06-13 RX ORDER — RIVASTIGMINE TARTRATE 1.5 MG/1
1.5 CAPSULE ORAL 2 TIMES DAILY
Qty: 180 CAPSULE | Refills: 0 | Status: SHIPPED | OUTPATIENT
Start: 2024-06-13

## 2024-06-17 ENCOUNTER — PATIENT MESSAGE (OUTPATIENT)
Dept: FAMILY MEDICINE CLINIC | Age: 84
End: 2024-06-17

## 2024-06-18 ENCOUNTER — TELEPHONE (OUTPATIENT)
Dept: FAMILY MEDICINE CLINIC | Age: 84
End: 2024-06-18

## 2024-06-18 NOTE — TELEPHONE ENCOUNTER
From: Crystal Pandey  To: Dr. Alexa Ortiz  Sent: 6/17/2024 7:29 PM EDT  Subject: Jana Pandey medication change    Hi Dr. Ortiz,  Wanted to let you know that Martha and I decided not to fill the new prescription for Exelone. We decided that for the most part mom is doing pretty well on the medication she is currently on and didn’t want to chance any possible side effects she might experience with anything new. Thanks for all your help .

## 2024-06-18 NOTE — TELEPHONE ENCOUNTER
Pt saw Dr Reza with [facility] demential support program 4/2024 for annual visit. We have other consultations under media from Dr Reza but not most recent one. Pls call his office to request copy. Thx.

## 2024-09-11 ENCOUNTER — TELEPHONE (OUTPATIENT)
Dept: FAMILY MEDICINE CLINIC | Age: 84
End: 2024-09-11

## 2024-09-11 DIAGNOSIS — E78.2 MIXED HYPERLIPIDEMIA: ICD-10-CM

## 2024-09-11 DIAGNOSIS — R73.03 PREDIABETES: Primary | ICD-10-CM

## 2024-10-21 ENCOUNTER — OFFICE VISIT (OUTPATIENT)
Dept: FAMILY MEDICINE CLINIC | Age: 84
End: 2024-10-21
Payer: MEDICARE

## 2024-10-21 VITALS
OXYGEN SATURATION: 96 % | HEIGHT: 64 IN | BODY MASS INDEX: 28.51 KG/M2 | DIASTOLIC BLOOD PRESSURE: 60 MMHG | SYSTOLIC BLOOD PRESSURE: 100 MMHG | WEIGHT: 167 LBS | HEART RATE: 77 BPM

## 2024-10-21 DIAGNOSIS — G30.9 ALZHEIMER DISEASE (HCC): ICD-10-CM

## 2024-10-21 DIAGNOSIS — Z00.00 MEDICARE ANNUAL WELLNESS VISIT, SUBSEQUENT: Primary | ICD-10-CM

## 2024-10-21 DIAGNOSIS — F02.80 ALZHEIMER DISEASE (HCC): ICD-10-CM

## 2024-10-21 DIAGNOSIS — R73.03 PREDIABETES: ICD-10-CM

## 2024-10-21 DIAGNOSIS — E78.2 MIXED HYPERLIPIDEMIA: ICD-10-CM

## 2024-10-21 PROCEDURE — 3078F DIAST BP <80 MM HG: CPT | Performed by: FAMILY MEDICINE

## 2024-10-21 PROCEDURE — 1123F ACP DISCUSS/DSCN MKR DOCD: CPT | Performed by: FAMILY MEDICINE

## 2024-10-21 PROCEDURE — G0439 PPPS, SUBSEQ VISIT: HCPCS | Performed by: FAMILY MEDICINE

## 2024-10-21 PROCEDURE — 3074F SYST BP LT 130 MM HG: CPT | Performed by: FAMILY MEDICINE

## 2024-10-21 ASSESSMENT — PATIENT HEALTH QUESTIONNAIRE - PHQ9
2. FEELING DOWN, DEPRESSED OR HOPELESS: NOT AT ALL
SUM OF ALL RESPONSES TO PHQ9 QUESTIONS 1 & 2: 0
SUM OF ALL RESPONSES TO PHQ QUESTIONS 1-9: 0
4. FEELING TIRED OR HAVING LITTLE ENERGY: NOT AT ALL
3. TROUBLE FALLING OR STAYING ASLEEP: NOT AT ALL
SUM OF ALL RESPONSES TO PHQ QUESTIONS 1-9: 0
1. LITTLE INTEREST OR PLEASURE IN DOING THINGS: NOT AT ALL
SUM OF ALL RESPONSES TO PHQ QUESTIONS 1-9: 0
SUM OF ALL RESPONSES TO PHQ QUESTIONS 1-9: 0

## 2024-10-21 ASSESSMENT — LIFESTYLE VARIABLES: HOW OFTEN DO YOU HAVE A DRINK CONTAINING ALCOHOL: NEVER

## 2024-10-21 NOTE — PROGRESS NOTES
problem or aren't sure how to get started with activity.  If you're already active, ask your doctor if there is anything you should change to stay safe as your body and health change.  If you tend to feel dizzy after you take medicine, avoid activity at that time. Try being active before you take your medicine. This will reduce your risk of falls.  If you plan to be active at home, make sure to clear your space before you get started. Remove things like TV cords, coffee tables, and throw rugs. It's safest to have plenty of space to move freely.  The key to getting more active is to take it slow and steady. Try to improve only a little bit at a time. Pick just one area to improve on at first. And if an activity hurts, stop and talk to your doctor.  Where can you learn more?  Go to https://www.[employer].net/patientEd and enter P600 to learn more about \"Learning About Being Active as an Older Adult.\"  Current as of: June 5, 2023  Content Version: 14.2  © 2024 [employer].   Care instructions adapted under license by [employer]. If you have questions about a medical condition or this instruction, always ask your healthcare professional. Healthwise, Incorporated disclaims any warranty or liability for your use of this information.           Learning About Vision Tests  What are vision tests?     The four most common vision tests are visual acuity tests, refraction, visual field tests, and color vision tests.  Visual acuity (sharpness) tests  These tests are used:  To see if you need glasses or contact lenses.  To monitor an eye problem.  To check an eye injury.  Visual acuity tests are done as part of routine exams. You may also have this test when you get your 's license or apply for some types of jobs.  Visual field tests  These tests are used:  To check for vision loss in any area of your range of vision.  To screen for certain eye diseases.  To look for nerve damage after a stroke, head injury, or

## 2024-10-21 NOTE — PATIENT INSTRUCTIONS
tend to feel dizzy after you take medicine, avoid activity at that time. Try being active before you take your medicine. This will reduce your risk of falls.  If you plan to be active at home, make sure to clear your space before you get started. Remove things like TV cords, coffee tables, and throw rugs. It's safest to have plenty of space to move freely.  The key to getting more active is to take it slow and steady. Try to improve only a little bit at a time. Pick just one area to improve on at first. And if an activity hurts, stop and talk to your doctor.  Where can you learn more?  Go to https://www.[employer].net/patientEd and enter P600 to learn more about \"Learning About Being Active as an Older Adult.\"  Current as of: June 5, 2023  Content Version: 14.2  © 2024 [employer].   Care instructions adapted under license by [employer]. If you have questions about a medical condition or this instruction, always ask your healthcare professional. Healthwise, Incorporated disclaims any warranty or liability for your use of this information.           Learning About Vision Tests  What are vision tests?     The four most common vision tests are visual acuity tests, refraction, visual field tests, and color vision tests.  Visual acuity (sharpness) tests  These tests are used:  To see if you need glasses or contact lenses.  To monitor an eye problem.  To check an eye injury.  Visual acuity tests are done as part of routine exams. You may also have this test when you get your 's license or apply for some types of jobs.  Visual field tests  These tests are used:  To check for vision loss in any area of your range of vision.  To screen for certain eye diseases.  To look for nerve damage after a stroke, head injury, or other problem that could reduce blood flow to the brain.  Refraction and color tests  A refraction test is done to find the right prescription for glasses and contact lenses.  A color vision

## 2024-10-22 LAB
ANION GAP SERPL CALCULATED.3IONS-SCNC: 14 MMOL/L (ref 3–16)
BUN SERPL-MCNC: 31 MG/DL (ref 7–20)
CALCIUM SERPL-MCNC: 9.7 MG/DL (ref 8.3–10.6)
CHLORIDE SERPL-SCNC: 106 MMOL/L (ref 99–110)
CHOLEST SERPL-MCNC: 248 MG/DL (ref 0–199)
CO2 SERPL-SCNC: 20 MMOL/L (ref 21–32)
CREAT SERPL-MCNC: 1.3 MG/DL (ref 0.6–1.2)
EST. AVERAGE GLUCOSE BLD GHB EST-MCNC: 108.3 MG/DL
GFR SERPLBLD CREATININE-BSD FMLA CKD-EPI: 40 ML/MIN/{1.73_M2}
GLUCOSE SERPL-MCNC: 117 MG/DL (ref 70–99)
HBA1C MFR BLD: 5.4 %
HDLC SERPL-MCNC: 40 MG/DL (ref 40–60)
LDLC SERPL CALC-MCNC: 176 MG/DL
POTASSIUM SERPL-SCNC: 4.9 MMOL/L (ref 3.5–5.1)
SODIUM SERPL-SCNC: 140 MMOL/L (ref 136–145)
TRIGL SERPL-MCNC: 159 MG/DL (ref 0–150)
VLDLC SERPL CALC-MCNC: 32 MG/DL

## 2024-10-22 RX ORDER — ATORVASTATIN CALCIUM 20 MG/1
20 TABLET, FILM COATED ORAL DAILY
Qty: 90 TABLET | Refills: 3 | Status: SHIPPED | OUTPATIENT
Start: 2024-10-22

## 2024-10-28 ENCOUNTER — TELEPHONE (OUTPATIENT)
Dept: FAMILY MEDICINE CLINIC | Age: 84
End: 2024-10-28

## 2024-10-28 DIAGNOSIS — I10 HYPERTENSION, UNSPECIFIED TYPE: ICD-10-CM

## 2024-10-28 RX ORDER — LOSARTAN POTASSIUM 100 MG/1
100 TABLET ORAL DAILY
Qty: 90 TABLET | Refills: 3 | Status: SHIPPED | OUTPATIENT
Start: 2024-10-28

## 2024-10-28 NOTE — TELEPHONE ENCOUNTER
[facility] is requesting a rx for     losartan (COZAAR) 100 MG tablet     Last OV 10/21/2024  .    Next OV 4/23/2025

## 2024-10-28 NOTE — TELEPHONE ENCOUNTER
Last Office Visit  -  10/21/2024  Next Office Visit  -  4/23/2025    Last Filled  -  11/6/2023  Last UDS -    Contract -      This one is also saying alt required?

## 2024-11-27 ENCOUNTER — OFFICE VISIT (OUTPATIENT)
Age: 84
End: 2024-11-27

## 2024-11-27 ENCOUNTER — TELEPHONE (OUTPATIENT)
Dept: FAMILY MEDICINE CLINIC | Age: 84
End: 2024-11-27

## 2024-11-27 VITALS
HEIGHT: 64 IN | HEART RATE: 94 BPM | SYSTOLIC BLOOD PRESSURE: 126 MMHG | OXYGEN SATURATION: 95 % | DIASTOLIC BLOOD PRESSURE: 60 MMHG | TEMPERATURE: 97.4 F | WEIGHT: 167 LBS | BODY MASS INDEX: 28.51 KG/M2

## 2024-11-27 DIAGNOSIS — N30.00 ACUTE CYSTITIS WITHOUT HEMATURIA: ICD-10-CM

## 2024-11-27 DIAGNOSIS — R41.82 ALTERED MENTAL STATUS, UNSPECIFIED ALTERED MENTAL STATUS TYPE: Primary | ICD-10-CM

## 2024-11-27 LAB
BILIRUBIN, POC: NEGATIVE
BLOOD URINE, POC: ABNORMAL
CLARITY, POC: CLEAR
COLOR, POC: YELLOW
GLUCOSE URINE, POC: NEGATIVE MG/DL
KETONES, POC: NEGATIVE MG/DL
LEUKOCYTE EST, POC: ABNORMAL
NITRITE, POC: NEGATIVE
PH, POC: 7.5
PROTEIN, POC: NEGATIVE MG/DL
SPECIFIC GRAVITY, POC: 1.01
UROBILINOGEN, POC: 0.2 MG/DL

## 2024-11-27 RX ORDER — CEFUROXIME AXETIL 500 MG/1
500 TABLET ORAL 2 TIMES DAILY
Qty: 14 TABLET | Refills: 0 | Status: SHIPPED | OUTPATIENT
Start: 2024-11-27 | End: 2024-12-04

## 2024-11-27 NOTE — PATIENT INSTRUCTIONS
Keep hydrated,  (of no contraindications) as felt needed for pain/temperature  Take medication as prescribed  If urine culture comes back (negative), schedule appointment to see your PCP        Follow up in 7-10 days to recheck urine   go to the ER if  she seems worse,fever, worsening mental status changes,  or  has new symptoms or concerns

## 2024-11-27 NOTE — PROGRESS NOTES
Crystal Pandey (:  1940) is a 84 y.o. female,New patient, here for evaluation of the following chief complaint(s):  No chief complaint on file.      ASSESSMENT/PLAN:    ICD-10-CM    1. Altered mental status, unspecified altered mental status type  R41.82 POCT Urinalysis no Micro     Culture, Urine      2. Acute cystitis without hematuria  N30.00 cefUROXime (CEFTIN) 500 MG tablet        Results for orders placed or performed in visit on 24   POCT Urinalysis no Micro   Result Value Ref Range    Color, UA yellow     Clarity, UA clear     Glucose, UA POC negative mg/dL    Bilirubin, UA negative     Ketones, UA negative mg/dL    Spec Grav, UA 1.015     Blood, UA POC small     pH, UA 7.5     Protein, UA POC negative mg/dL    Urobilinogen, UA 0.2 mg/dL    Leukocytes, UA large     Nitrite, UA negative      No acute distress or confusion, aware she came [place] place [place], recognizes daughter  Speech clear and organized   Does not appear ill,  does not appear dehydrated, follows commands , no history of diabetes   Urine check suggests UTI which would explain changes . Wears depends so unaware if increase urinary frequency  Discussed with daughter, option is to take mom to ER for further evaluation , testing that our office cannot do (lab work, imaging) to r/o any other possible causes .(Neuro-cardio-pulmonary-check electrolytes-infection) Her mom does not appear acutely ill, she is lucid, exam otherwise non focal and daughter sees improvement in her mental status while here in office from last night  Other option is to empirically start antibiotic for possible UTI and wait for cultures with instructions to daughter to bring her mother to the ER if notices another episode of acute mental status changes, fever, cough, altered speech, vomiting/diarrhea facial droop or lack of use/ weakness  of an extremity,   Mom wants to return home and is willing to start antibiotic also aware if not UTI,

## 2024-11-27 NOTE — TELEPHONE ENCOUNTER
Pt daughter, Martha, called. Pt has alzheimer's, had a rough night. Wanted to get on to see if pt has a UTI, no appts avail. Advice?

## 2024-11-28 ASSESSMENT — ENCOUNTER SYMPTOMS
COUGH: 0
SHORTNESS OF BREATH: 0
DIARRHEA: 0
VOMITING: 0
TROUBLE SWALLOWING: 0
VOICE CHANGE: 0

## 2024-11-30 LAB
BACTERIA UR CULT: ABNORMAL
ORGANISM: ABNORMAL

## 2024-12-02 ENCOUNTER — OFFICE VISIT (OUTPATIENT)
Dept: FAMILY MEDICINE CLINIC | Age: 84
End: 2024-12-02
Payer: MEDICARE

## 2024-12-02 VITALS
BODY MASS INDEX: 28.61 KG/M2 | SYSTOLIC BLOOD PRESSURE: 128 MMHG | HEART RATE: 83 BPM | DIASTOLIC BLOOD PRESSURE: 78 MMHG | WEIGHT: 167.6 LBS | OXYGEN SATURATION: 96 % | HEIGHT: 64 IN

## 2024-12-02 DIAGNOSIS — Z87.440 RECENT URINARY TRACT INFECTION: ICD-10-CM

## 2024-12-02 DIAGNOSIS — R41.0 CONFUSION: Primary | ICD-10-CM

## 2024-12-02 PROCEDURE — 36415 COLL VENOUS BLD VENIPUNCTURE: CPT | Performed by: NURSE PRACTITIONER

## 2024-12-02 PROCEDURE — 3078F DIAST BP <80 MM HG: CPT | Performed by: NURSE PRACTITIONER

## 2024-12-02 PROCEDURE — 1123F ACP DISCUSS/DSCN MKR DOCD: CPT | Performed by: NURSE PRACTITIONER

## 2024-12-02 PROCEDURE — 99213 OFFICE O/P EST LOW 20 MIN: CPT | Performed by: NURSE PRACTITIONER

## 2024-12-02 PROCEDURE — 1159F MED LIST DOCD IN RCRD: CPT | Performed by: NURSE PRACTITIONER

## 2024-12-02 PROCEDURE — 3074F SYST BP LT 130 MM HG: CPT | Performed by: NURSE PRACTITIONER

## 2024-12-02 ASSESSMENT — ENCOUNTER SYMPTOMS
GASTROINTESTINAL NEGATIVE: 1
RESPIRATORY NEGATIVE: 1

## 2024-12-02 NOTE — PROGRESS NOTES
Patient: Crystal Pandey is a 84 y.o. female who presents today with the following Chief Complaint(s):  Chief Complaint   Patient presents with    Other     Confusion- urgent care on Wednesday + UTI- has had continued confusion        Assessment:  Encounter Diagnoses   Name Primary?    Confusion Yes    Recent urinary tract infection        Plan:  Assessment & Plan  Confusion    Likely related to UTI, continue antibiotics and check blood work to be sure electrolytes are normal range.     Orders:    Basic Metabolic Panel    Recent urinary tract infection    Continue Antibiotic and check blood work. If worsening confusion continues then follow up next week and recheck urine.     Orders:    Basic Metabolic Panel         HPI  Patient presents today with concerns of confusion and recent UTI infection. She is here with her daughters today. She has a baseline of alzheimers but daughter states this has been a significant change in her normal confusion. She is taking the antibiotics and has  few more days but they state the confusion has continued and can be worse at night.         Current Outpatient Medications   Medication Sig Dispense Refill    cefUROXime (CEFTIN) 500 MG tablet Take 1 tablet by mouth 2 times daily for 7 days 14 tablet 0    losartan (COZAAR) 100 MG tablet Take 1 tablet by mouth daily 90 tablet 3    atorvastatin (LIPITOR) 20 MG tablet Take 1 tablet by mouth daily 90 tablet 3    memantine (NAMENDA) 10 MG tablet Take 1 tablet by mouth 2 times daily 180 tablet 3    aspirin 81 MG chewable tablet Take 1 tablet by mouth daily 30 tablet 3    DULoxetine (CYMBALTA) 30 MG extended release capsule Take 1 capsule by mouth daily      apixaban (ELIQUIS) 5 MG TABS tablet TAKE ONE TABLET BY MOUTH TWICE A DAY 60 tablet 12    dilTIAZem (TIAZAC) 180 MG extended release capsule Take 1 capsule by mouth daily 90 capsule 3    Cholecalciferol (VITAMIN D3) 50 MCG (2000 UT) CAPS Take by mouth       No current facility-administered

## 2024-12-03 LAB
ANION GAP SERPL CALCULATED.3IONS-SCNC: 10 MMOL/L (ref 3–16)
BUN SERPL-MCNC: 23 MG/DL (ref 7–20)
CALCIUM SERPL-MCNC: 9.9 MG/DL (ref 8.3–10.6)
CHLORIDE SERPL-SCNC: 100 MMOL/L (ref 99–110)
CO2 SERPL-SCNC: 27 MMOL/L (ref 21–32)
CREAT SERPL-MCNC: 1.1 MG/DL (ref 0.6–1.2)
GFR SERPLBLD CREATININE-BSD FMLA CKD-EPI: 49 ML/MIN/{1.73_M2}
GLUCOSE SERPL-MCNC: 103 MG/DL (ref 70–99)
POTASSIUM SERPL-SCNC: 5.1 MMOL/L (ref 3.5–5.1)
SODIUM SERPL-SCNC: 137 MMOL/L (ref 136–145)

## 2024-12-09 ENCOUNTER — HOSPITAL ENCOUNTER (EMERGENCY)
Age: 84
Discharge: HOME | End: 2024-12-09
Attending: EMERGENCY MEDICINE
Payer: MEDICARE

## 2024-12-09 ENCOUNTER — TELEPHONE (OUTPATIENT)
Dept: FAMILY MEDICINE CLINIC | Age: 84
End: 2024-12-09

## 2024-12-09 ENCOUNTER — OFFICE VISIT (OUTPATIENT)
Dept: FAMILY MEDICINE CLINIC | Age: 84
End: 2024-12-09
Payer: MEDICARE

## 2024-12-09 VITALS
HEIGHT: 64 IN | BODY MASS INDEX: 28.27 KG/M2 | OXYGEN SATURATION: 95 % | SYSTOLIC BLOOD PRESSURE: 116 MMHG | DIASTOLIC BLOOD PRESSURE: 62 MMHG | WEIGHT: 165.6 LBS | HEART RATE: 131 BPM

## 2024-12-09 VITALS
HEART RATE: 70 BPM | RESPIRATION RATE: 16 BRPM | DIASTOLIC BLOOD PRESSURE: 87 MMHG | OXYGEN SATURATION: 95 % | SYSTOLIC BLOOD PRESSURE: 130 MMHG | TEMPERATURE: 98.2 F

## 2024-12-09 DIAGNOSIS — F41.9 ANXIETY: ICD-10-CM

## 2024-12-09 DIAGNOSIS — F03.90 DEMENTIA, UNSPECIFIED DEMENTIA SEVERITY, UNSPECIFIED DEMENTIA TYPE, UNSPECIFIED WHETHER BEHAVIORAL, PSYCHOTIC, OR MOOD DISTURBANCE OR ANXIETY (HCC): ICD-10-CM

## 2024-12-09 DIAGNOSIS — R41.0 CONFUSION: ICD-10-CM

## 2024-12-09 DIAGNOSIS — I48.0 PAROXYSMAL A-FIB (HCC): Primary | ICD-10-CM

## 2024-12-09 DIAGNOSIS — Z87.440 RECENT URINARY TRACT INFECTION: ICD-10-CM

## 2024-12-09 DIAGNOSIS — N39.0 URINARY TRACT INFECTION WITHOUT HEMATURIA, SITE UNSPECIFIED: Primary | ICD-10-CM

## 2024-12-09 LAB
ALBUMIN SERPL-MCNC: 4.1 G/DL (ref 3.4–5)
ALBUMIN/GLOB SERPL: 1.4 {RATIO} (ref 1.1–2.2)
ALP SERPL-CCNC: 73 U/L (ref 40–129)
ALT SERPL-CCNC: 12 U/L (ref 10–40)
ANION GAP SERPL CALCULATED.3IONS-SCNC: 12 MMOL/L (ref 3–16)
AST SERPL-CCNC: 21 U/L (ref 15–37)
BACTERIA URNS QL MICRO: ABNORMAL /HPF
BASOPHILS # BLD: 0.1 K/UL (ref 0–0.2)
BASOPHILS NFR BLD: 1.1 %
BILIRUB SERPL-MCNC: 0.7 MG/DL (ref 0–1)
BILIRUB UR QL STRIP.AUTO: NEGATIVE
BILIRUBIN, POC: NEGATIVE
BLOOD URINE, POC: NEGATIVE
BUN SERPL-MCNC: 21 MG/DL (ref 7–20)
CALCIUM SERPL-MCNC: 9.5 MG/DL (ref 8.3–10.6)
CHLORIDE SERPL-SCNC: 101 MMOL/L (ref 99–110)
CLARITY UR: CLEAR
CLARITY, POC: NORMAL
CO2 SERPL-SCNC: 25 MMOL/L (ref 21–32)
COARSE GRAN CASTS #/AREA URNS LPF: ABNORMAL /LPF (ref 0–2)
COLOR UR: ABNORMAL
COLOR, POC: NORMAL
CREAT SERPL-MCNC: 1.3 MG/DL (ref 0.6–1.2)
DEPRECATED RDW RBC AUTO: 13.1 % (ref 12.4–15.4)
EKG ATRIAL RATE: 326 BPM
EKG DIAGNOSIS: NORMAL
EKG Q-T INTERVAL: 354 MS
EKG QRS DURATION: 92 MS
EKG QTC CALCULATION (BAZETT): 465 MS
EKG R AXIS: 22 DEGREES
EKG T AXIS: -63 DEGREES
EKG VENTRICULAR RATE: 104 BPM
EOSINOPHIL # BLD: 0.1 K/UL (ref 0–0.6)
EOSINOPHIL NFR BLD: 1.1 %
EPI CELLS #/AREA URNS HPF: ABNORMAL /HPF (ref 0–5)
GFR SERPLBLD CREATININE-BSD FMLA CKD-EPI: 40 ML/MIN/{1.73_M2}
GLUCOSE SERPL-MCNC: 105 MG/DL (ref 70–99)
GLUCOSE UR STRIP.AUTO-MCNC: NEGATIVE MG/DL
GLUCOSE URINE, POC: NEGATIVE MG/DL
HCT VFR BLD AUTO: 44.4 % (ref 36–48)
HGB BLD-MCNC: 14.9 G/DL (ref 12–16)
HGB UR QL STRIP.AUTO: NEGATIVE
KETONES UR STRIP.AUTO-MCNC: NEGATIVE MG/DL
KETONES, POC: NEGATIVE MG/DL
LEUKOCYTE EST, POC: NORMAL
LEUKOCYTE ESTERASE UR QL STRIP.AUTO: ABNORMAL
LYMPHOCYTES # BLD: 1.4 K/UL (ref 1–5.1)
LYMPHOCYTES NFR BLD: 13.9 %
MCH RBC QN AUTO: 31.5 PG (ref 26–34)
MCHC RBC AUTO-ENTMCNC: 33.5 G/DL (ref 31–36)
MCV RBC AUTO: 94.1 FL (ref 80–100)
MONOCYTES # BLD: 0.8 K/UL (ref 0–1.3)
MONOCYTES NFR BLD: 7.9 %
MUCOUS THREADS #/AREA URNS LPF: ABNORMAL /LPF
NEUTROPHILS # BLD: 7.6 K/UL (ref 1.7–7.7)
NEUTROPHILS NFR BLD: 76 %
NITRITE UR QL STRIP.AUTO: NEGATIVE
NITRITE, POC: NEGATIVE
PH UR STRIP.AUTO: 6 [PH] (ref 5–8)
PH, POC: 7
PLATELET # BLD AUTO: 299 K/UL (ref 135–450)
PMV BLD AUTO: 8.6 FL (ref 5–10.5)
POTASSIUM SERPL-SCNC: 4.3 MMOL/L (ref 3.5–5.1)
PROT SERPL-MCNC: 7.1 G/DL (ref 6.4–8.2)
PROT UR STRIP.AUTO-MCNC: NEGATIVE MG/DL
PROTEIN, POC: NEGATIVE MG/DL
RBC # BLD AUTO: 4.72 M/UL (ref 4–5.2)
RBC #/AREA URNS HPF: ABNORMAL /HPF (ref 0–4)
SODIUM SERPL-SCNC: 138 MMOL/L (ref 136–145)
SP GR UR STRIP.AUTO: 1.02 (ref 1–1.03)
SPECIFIC GRAVITY, POC: 1.01
TROPONIN, HIGH SENSITIVITY: 20 NG/L (ref 0–14)
TROPONIN, HIGH SENSITIVITY: 21 NG/L (ref 0–14)
UA COMPLETE W REFLEX CULTURE PNL UR: YES
UA DIPSTICK W REFLEX MICRO PNL UR: YES
URN SPEC COLLECT METH UR: ABNORMAL
UROBILINOGEN UR STRIP-ACNC: 0.2 E.U./DL
UROBILINOGEN, POC: 0.2 MG/DL
WBC # BLD AUTO: 10 K/UL (ref 4–11)
WBC #/AREA URNS HPF: >100 /HPF (ref 0–5)

## 2024-12-09 PROCEDURE — 36415 COLL VENOUS BLD VENIPUNCTURE: CPT

## 2024-12-09 PROCEDURE — 87186 SC STD MICRODIL/AGAR DIL: CPT

## 2024-12-09 PROCEDURE — 1123F ACP DISCUSS/DSCN MKR DOCD: CPT | Performed by: NURSE PRACTITIONER

## 2024-12-09 PROCEDURE — 93000 ELECTROCARDIOGRAM COMPLETE: CPT | Performed by: NURSE PRACTITIONER

## 2024-12-09 PROCEDURE — 3078F DIAST BP <80 MM HG: CPT | Performed by: NURSE PRACTITIONER

## 2024-12-09 PROCEDURE — 81002 URINALYSIS NONAUTO W/O SCOPE: CPT | Performed by: NURSE PRACTITIONER

## 2024-12-09 PROCEDURE — 85025 COMPLETE CBC W/AUTO DIFF WBC: CPT

## 2024-12-09 PROCEDURE — 6360000002 HC RX W HCPCS: Performed by: EMERGENCY MEDICINE

## 2024-12-09 PROCEDURE — 99214 OFFICE O/P EST MOD 30 MIN: CPT | Performed by: NURSE PRACTITIONER

## 2024-12-09 PROCEDURE — 80053 COMPREHEN METABOLIC PANEL: CPT

## 2024-12-09 PROCEDURE — 93010 ELECTROCARDIOGRAM REPORT: CPT | Performed by: INTERNAL MEDICINE

## 2024-12-09 PROCEDURE — 3074F SYST BP LT 130 MM HG: CPT | Performed by: NURSE PRACTITIONER

## 2024-12-09 PROCEDURE — 84484 ASSAY OF TROPONIN QUANT: CPT

## 2024-12-09 PROCEDURE — 96374 THER/PROPH/DIAG INJ IV PUSH: CPT

## 2024-12-09 PROCEDURE — 2580000003 HC RX 258: Performed by: EMERGENCY MEDICINE

## 2024-12-09 PROCEDURE — 87086 URINE CULTURE/COLONY COUNT: CPT

## 2024-12-09 PROCEDURE — 81001 URINALYSIS AUTO W/SCOPE: CPT

## 2024-12-09 PROCEDURE — 87077 CULTURE AEROBIC IDENTIFY: CPT

## 2024-12-09 PROCEDURE — 93005 ELECTROCARDIOGRAM TRACING: CPT | Performed by: EMERGENCY MEDICINE

## 2024-12-09 PROCEDURE — 99284 EMERGENCY DEPT VISIT MOD MDM: CPT

## 2024-12-09 PROCEDURE — 1159F MED LIST DOCD IN RCRD: CPT | Performed by: NURSE PRACTITIONER

## 2024-12-09 RX ORDER — LOSARTAN POTASSIUM 50 MG/1
50 TABLET ORAL DAILY
Qty: 30 TABLET | Refills: 5 | Status: SHIPPED | OUTPATIENT
Start: 2024-12-09

## 2024-12-09 RX ORDER — METOPROLOL SUCCINATE 50 MG/1
50 TABLET, EXTENDED RELEASE ORAL DAILY
Qty: 30 TABLET | Refills: 5 | Status: SHIPPED | OUTPATIENT
Start: 2024-12-09

## 2024-12-09 RX ORDER — CIPROFLOXACIN 500 MG/1
500 TABLET, FILM COATED ORAL 2 TIMES DAILY
Qty: 20 TABLET | Refills: 0 | Status: SHIPPED | OUTPATIENT
Start: 2024-12-09 | End: 2024-12-19

## 2024-12-09 RX ADMIN — WATER 1000 MG: 1 INJECTION INTRAMUSCULAR; INTRAVENOUS; SUBCUTANEOUS at 19:18

## 2024-12-09 ASSESSMENT — ENCOUNTER SYMPTOMS
GASTROINTESTINAL NEGATIVE: 1
RESPIRATORY NEGATIVE: 1

## 2024-12-09 ASSESSMENT — PAIN - FUNCTIONAL ASSESSMENT: PAIN_FUNCTIONAL_ASSESSMENT: 0-10

## 2024-12-09 ASSESSMENT — PAIN SCALES - GENERAL: PAINLEVEL_OUTOF10: 0

## 2024-12-09 NOTE — TELEPHONE ENCOUNTER
Received call from daughter , Lashanda stating she checked her mom's oxygen and it is fluctuating. Pulse ox was 73 , pulse of 37. Patient went to the bathroom and it was pulse ox was 97 pulse is 47. Again oxygen is 97,  pulse 53. Requesting advise

## 2024-12-09 NOTE — TELEPHONE ENCOUNTER
Daughter states that pt had just swallowed tablet with these readings. Pt does not seem overly anxious, however daughter states that last time pt was hospitalized for Atrial flutter the pt's alarms were going off and she was not having any symptoms at all

## 2024-12-09 NOTE — TELEPHONE ENCOUNTER
Pls tell Lashanda that EG and I spoke and feel it's best for her mom to have a more thorough eval in the ED. Pls rec that she take her mom. Thx.

## 2024-12-09 NOTE — TELEPHONE ENCOUNTER
Is this after having taken the metoprolol? Is patient experiencing any symptoms, does she seem more anxious or has that improved. My thought is the 73 was not an accurate reading. Her oxygen in the office was 95 so I would go with the last 2 readings being accurate.

## 2024-12-09 NOTE — PATIENT INSTRUCTIONS
Decrease Losartan to 50 mg ( a new prescription was sent in)  Add Metoprolol 50 mg daily (New prescription sent)    If she complains of chest pain, feels short of breath or the anxiety is not improving then have her seen at the ER.     Monitor her Pulse at home- if Pulse is jumping around after that the new medication from 100 to 160 then that it not controlled and she should be seen at the ER.    If if is staying around low 100s this would be stable as long as she is not feeling short of breath or chest pain.     Will send a culture on the urine- no antibiotics until culture is back. The dip in the office was improved from previous.

## 2024-12-09 NOTE — PROGRESS NOTES
Patient: Crystal Pandey is a 84 y.o. female who presents today with the following Chief Complaint(s):  Chief Complaint   Patient presents with    Urinary Tract Infection     Completed antibiotics- still having confusion/anxiety        Assessment:  Encounter Diagnoses   Name Primary?    Paroxysmal A-fib (HCC) Yes    Anxiety     Confusion     Recent urinary tract infection        Plan:  Assessment & Plan  Paroxysmal A-fib (HCC)    EKG shows atrial fibrillation and was read with Dr. Ortiz.  It did read some ST depression however this is difficult to officially say when she is having the A-fib.  Dr. Clement recommendation is to decrease losartan to 50 mg daily and add metoprolol 50 mg extended release daily and have patient follow-up with cardiology.  If she develops any complaints of chest pain, more confusion or shortness of breath she is to be seen at the ER.    Orders:    EKG 12 lead    losartan (COZAAR) 50 MG tablet; Take 1 tablet by mouth daily    metoprolol succinate (TOPROL XL) 50 MG extended release tablet; Take 1 tablet by mouth daily    Anxiety    Likely related to the A-fib.  Will treat medications as listed above and patient will follow-up with PCP if anxiety is not improving.         Confusion    Will recheck urine to rule out any recurrent infection.      Orders:    POCT Urinalysis no Micro    Recent urinary tract infection    Urine dip positive for small leuks.  Will send a culture and treat based on the culture.    Orders:    POCT Urinalysis no Micro    Culture, Urine         HPI  Patient presents today with her daughter with concerns of ongoing anxiety and confusion.  She was recently treated for a UTI and they completed all of the antibiotics with no improvement in the anxiety and confusion.  Her daughter states even since they have been out this afternoon she seems little worse with her anxiety.  Upon exam today she was found to be in A-fib which she does have a history of paroxysmal A-fib/a

## 2024-12-10 ENCOUNTER — CARE COORDINATION (OUTPATIENT)
Dept: CARE COORDINATION | Age: 84
End: 2024-12-10

## 2024-12-10 NOTE — CARE COORDINATION
Ambulatory Care Coordination Note     12/10/2024 2:56 PM     Patient Current Location:  [place]     This patient was received as a referral from Population health report .    ACM contacted the family by telephone. Verified name and  with family as identifiers. Provided introduction to self, and explanation of the ACM role.   Family declined care management services at this time.          ACM: Jade Griffin RN     Challenges to be reviewed by the provider   Additional needs identified to be addressed with provider No  none               Method of communication with provider: none.    Utilization: N/A - Initial Call     Care Summary Note:  ACM made outreach today following E/D visit at [facility] on 24 for irregular heart beat. Spoke to dtr Lashanda and updated that mom is doing well today taking medications as directed with no barriers. Discussed AVS and dtr stating they do have the Cipro to give to mom, discussed E/D F/U apt with PCP. Dtr not sure. Dtr to reach out to PCP office and clarify as well. All questions answered, no other concerns at this time.      PCP/Specialist follow up:   Future Appointments         Provider Specialty Dept Phone    2025 11:00 AM Alexa Ortiz MD Family Medicine [phone]            Jade BAL, RN  Respecting Choices® Advanced Steps ACP Facilitator  Ambulatory Care Manager  [facility]  [phone]

## 2024-12-10 NOTE — ED PROVIDER NOTES
for new or worsening symptoms.      Records Reviewed: Reviewed prior EKG for atrial arrhythmia as well as prior primary care visit during which patient was treated for UTI.    Chronic Conditions: Dementia  Disposition Considerations: Admission considered and any deed offered to the patient and decided against based on patient's stability, lack of sepsis.      I am the primary physician of Record.     FINAL IMPRESSION    1. Urinary tract infection without hematuria, site unspecified    2. Dementia, unspecified dementia severity, unspecified dementia type, unspecified whether behavioral, psychotic, or mood disturbance or anxiety ([facility])         DISPOSITION/PLAN   DISPOSITION Decision To Discharge 12/09/2024 08:11:53 PM   DISPOSITION CONDITION Stable            PATIENT REFERRED TO:   Alexa Ortiz MD  [address] [address]  [phone]    Schedule an appointment as soon as possible for a visit in 2 days       DISCHARGE MEDICATIONS:   Discharge Medication List as of 12/9/2024  8:25 PM        START taking these medications    Details   ciprofloxacin (CIPRO) 500 MG tablet Take 1 tablet by mouth 2 times daily for 10 days, Disp-20 tablet, R-0Normal            DISCONTINUED MEDICATIONS:   Discharge Medication List as of 12/9/2024  8:25 PM               (Please note that portions of this note were completed with a voice recognition program.  Efforts were made to edit the dictations but occasionally words are mis-transcribed.)     Pedrito Liu MD (electronically signed)                 Pedrito Liu MD  12/09/24 [id number]

## 2024-12-11 LAB
BACTERIA UR CULT: ABNORMAL
BACTERIA UR CULT: ABNORMAL
ORGANISM: ABNORMAL
ORGANISM: ABNORMAL

## 2024-12-13 LAB
BACTERIA UR CULT: ABNORMAL
BACTERIA UR CULT: ABNORMAL
ORGANISM: ABNORMAL
ORGANISM: ABNORMAL

## 2024-12-17 ENCOUNTER — TELEPHONE (OUTPATIENT)
Dept: FAMILY MEDICINE CLINIC | Age: 84
End: 2024-12-17

## 2024-12-18 NOTE — TELEPHONE ENCOUNTER
Adonis from [facility] called to ask for a verbal okay to admit pt to hospice care. Please call Adonis at [phone] to give verbal okay.  
Adonis was given vo per RS.   
Whitney from [facility] called. They will be going out to see pt tomorrow and are in need of the following items to be faxed for the visit.  HMP, Face Sheet, insurance card, med list and an order for hospice.  Fax number is [phone]  
admission

## 2024-12-19 DIAGNOSIS — I48.4 ATYPICAL ATRIAL FLUTTER (HCC): ICD-10-CM

## 2024-12-19 RX ORDER — DILTIAZEM HYDROCHLORIDE 180 MG/1
180 CAPSULE, EXTENDED RELEASE ORAL DAILY
Qty: 90 CAPSULE | Refills: 3 | Status: SHIPPED | OUTPATIENT
Start: 2024-12-19

## 2024-12-27 ENCOUNTER — TELEPHONE (OUTPATIENT)
Dept: FAMILY MEDICINE CLINIC | Age: 84
End: 2024-12-27

## 2024-12-27 NOTE — TELEPHONE ENCOUNTER
Jess from [facility] called, they need a current med list signed by RS faxed over for this patient. Fax number is [phone]

## 2025-01-02 NOTE — TELEPHONE ENCOUNTER
Jess [phone] [facility] requesting call back. She states she will also fax over form for signature.

## 2025-01-02 NOTE — TELEPHONE ENCOUNTER
Jess with [facility] called to request orders to be faxed to them for Hospice. Faxed to number they left.

## 2025-05-16 ENCOUNTER — TELEPHONE (OUTPATIENT)
Dept: FAMILY MEDICINE CLINIC | Age: 85
End: 2025-05-16

## 2025-05-16 NOTE — TELEPHONE ENCOUNTER
Pt daughter Lashanda came in to drop off forms to be completed -Level of care assessment forms -for the patient. I will put the forms in provider folder. Please contact lashanda when forms are completed     Lashanda - [phone]   Spine appears normal, range of motion is not limited, no muscle or joint tenderness

## 2025-05-25 ENCOUNTER — APPOINTMENT (OUTPATIENT)
Dept: CT IMAGING | Age: 85
DRG: 480 | End: 2025-05-25
Payer: MEDICARE

## 2025-05-25 ENCOUNTER — APPOINTMENT (OUTPATIENT)
Dept: GENERAL RADIOLOGY | Age: 85
DRG: 480 | End: 2025-05-25
Payer: MEDICARE

## 2025-05-25 ENCOUNTER — HOSPITAL ENCOUNTER (INPATIENT)
Age: 85
LOS: 5 days | Discharge: NURSING, REHAB OR LONG-TERM CARE | DRG: 480 | End: 2025-05-30
Attending: EMERGENCY MEDICINE | Admitting: STUDENT IN AN ORGANIZED HEALTH CARE EDUCATION/TRAINING PROGRAM
Payer: MEDICARE
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
## 2025-05-25 DIAGNOSIS — S72.002A CLOSED FRACTURE OF LEFT HIP, INITIAL ENCOUNTER (HCC): ICD-10-CM

## 2025-05-25 LAB
ABO/RH: NORMAL
ALBUMIN SERPL-MCNC: 3.8 G/DL (ref 3.4–5)
ALBUMIN/GLOB SERPL: 1.5 {RATIO} (ref 1.1–2.2)
ALP SERPL-CCNC: 95 U/L (ref 40–129)
ALT SERPL-CCNC: 11 U/L (ref 10–40)
ANION GAP SERPL CALCULATED.3IONS-SCNC: 10 MMOL/L (ref 3–16)
ANTIBODY SCREEN: NORMAL
AST SERPL-CCNC: 18 U/L (ref 15–37)
BACTERIA URNS QL MICRO: ABNORMAL /HPF
BASOPHILS # BLD: 0.1 K/UL (ref 0–0.2)
BASOPHILS NFR BLD: 0.8 %
BILIRUB SERPL-MCNC: 0.8 MG/DL (ref 0–1)
BILIRUB UR QL STRIP.AUTO: ABNORMAL
BUN SERPL-MCNC: 17 MG/DL (ref 7–20)
CALCIUM SERPL-MCNC: 9.2 MG/DL (ref 8.3–10.6)
CHLORIDE SERPL-SCNC: 104 MMOL/L (ref 99–110)
CLARITY UR: CLEAR
CO2 SERPL-SCNC: 26 MMOL/L (ref 21–32)
COLOR UR: YELLOW
CREAT SERPL-MCNC: 0.9 MG/DL (ref 0.6–1.2)
DEPRECATED RDW RBC AUTO: 14.2 % (ref 12.4–15.4)
EOSINOPHIL # BLD: 0.2 K/UL (ref 0–0.6)
EOSINOPHIL NFR BLD: 2.1 %
EPI CELLS #/AREA URNS HPF: ABNORMAL /HPF (ref 0–5)
GFR SERPLBLD CREATININE-BSD FMLA CKD-EPI: 63 ML/MIN/{1.73_M2}
GLUCOSE SERPL-MCNC: 105 MG/DL (ref 70–99)
GLUCOSE UR STRIP.AUTO-MCNC: NEGATIVE MG/DL
HCT VFR BLD AUTO: 39.5 % (ref 36–48)
HGB BLD-MCNC: 13.1 G/DL (ref 12–16)
HGB UR QL STRIP.AUTO: NEGATIVE
INR PPP: 1.09 (ref 0.85–1.15)
KETONES UR STRIP.AUTO-MCNC: NEGATIVE MG/DL
LEUKOCYTE ESTERASE UR QL STRIP.AUTO: NEGATIVE
LYMPHOCYTES # BLD: 1 K/UL (ref 1–5.1)
LYMPHOCYTES NFR BLD: 10.7 %
MCH RBC QN AUTO: 30.2 PG (ref 26–34)
MCHC RBC AUTO-ENTMCNC: 33.1 G/DL (ref 31–36)
MCV RBC AUTO: 91.3 FL (ref 80–100)
MONOCYTES # BLD: 1 K/UL (ref 0–1.3)
MONOCYTES NFR BLD: 10.4 %
MUCOUS THREADS #/AREA URNS LPF: ABNORMAL /LPF
NEUTROPHILS # BLD: 7 K/UL (ref 1.7–7.7)
NEUTROPHILS NFR BLD: 76 %
NITRITE UR QL STRIP.AUTO: NEGATIVE
NT-PROBNP SERPL-MCNC: 4046 PG/ML (ref 0–449)
PH UR STRIP.AUTO: 6 [PH] (ref 5–8)
PLATELET # BLD AUTO: 314 K/UL (ref 135–450)
PMV BLD AUTO: 8 FL (ref 5–10.5)
POTASSIUM SERPL-SCNC: 4.5 MMOL/L (ref 3.5–5.1)
PROT SERPL-MCNC: 6.4 G/DL (ref 6.4–8.2)
PROT UR STRIP.AUTO-MCNC: 30 MG/DL
PROTHROMBIN TIME: 14.4 SEC (ref 11.9–14.9)
RBC # BLD AUTO: 4.33 M/UL (ref 4–5.2)
RBC #/AREA URNS HPF: ABNORMAL /HPF (ref 0–4)
SODIUM SERPL-SCNC: 140 MMOL/L (ref 136–145)
SP GR UR STRIP.AUTO: >=1.03 (ref 1–1.03)
TROPONIN, HIGH SENSITIVITY: 26 NG/L (ref 0–14)
UA COMPLETE W REFLEX CULTURE PNL UR: ABNORMAL
UA DIPSTICK W REFLEX MICRO PNL UR: YES
URN SPEC COLLECT METH UR: ABNORMAL
UROBILINOGEN UR STRIP-ACNC: 1 E.U./DL
WBC # BLD AUTO: 9.3 K/UL (ref 4–11)
WBC #/AREA URNS HPF: ABNORMAL /HPF (ref 0–5)

## 2025-05-25 PROCEDURE — 71045 X-RAY EXAM CHEST 1 VIEW: CPT

## 2025-05-25 PROCEDURE — 70450 CT HEAD/BRAIN W/O DYE: CPT

## 2025-05-25 PROCEDURE — 96374 THER/PROPH/DIAG INJ IV PUSH: CPT

## 2025-05-25 PROCEDURE — 81001 URINALYSIS AUTO W/SCOPE: CPT

## 2025-05-25 PROCEDURE — 2580000003 HC RX 258: Performed by: NURSE PRACTITIONER

## 2025-05-25 PROCEDURE — 86900 BLOOD TYPING SEROLOGIC ABO: CPT

## 2025-05-25 PROCEDURE — 93005 ELECTROCARDIOGRAM TRACING: CPT | Performed by: NURSE PRACTITIONER

## 2025-05-25 PROCEDURE — 73700 CT LOWER EXTREMITY W/O DYE: CPT

## 2025-05-25 PROCEDURE — 86901 BLOOD TYPING SEROLOGIC RH(D): CPT

## 2025-05-25 PROCEDURE — 36415 COLL VENOUS BLD VENIPUNCTURE: CPT

## 2025-05-25 PROCEDURE — 96375 TX/PRO/DX INJ NEW DRUG ADDON: CPT

## 2025-05-25 PROCEDURE — 6360000002 HC RX W HCPCS: Performed by: EMERGENCY MEDICINE

## 2025-05-25 PROCEDURE — 85025 COMPLETE CBC W/AUTO DIFF WBC: CPT

## 2025-05-25 PROCEDURE — 99285 EMERGENCY DEPT VISIT HI MDM: CPT

## 2025-05-25 PROCEDURE — 6360000002 HC RX W HCPCS: Performed by: NURSE PRACTITIONER

## 2025-05-25 PROCEDURE — 94761 N-INVAS EAR/PLS OXIMETRY MLT: CPT

## 2025-05-25 PROCEDURE — 72125 CT NECK SPINE W/O DYE: CPT

## 2025-05-25 PROCEDURE — 85610 PROTHROMBIN TIME: CPT

## 2025-05-25 PROCEDURE — 86850 RBC ANTIBODY SCREEN: CPT

## 2025-05-25 PROCEDURE — 80053 COMPREHEN METABOLIC PANEL: CPT

## 2025-05-25 PROCEDURE — 6360000002 HC RX W HCPCS: Performed by: PHYSICIAN ASSISTANT

## 2025-05-25 PROCEDURE — 1200000000 HC SEMI PRIVATE

## 2025-05-25 PROCEDURE — 83880 ASSAY OF NATRIURETIC PEPTIDE: CPT

## 2025-05-25 PROCEDURE — 84484 ASSAY OF TROPONIN QUANT: CPT

## 2025-05-25 PROCEDURE — 73502 X-RAY EXAM HIP UNI 2-3 VIEWS: CPT

## 2025-05-25 PROCEDURE — 2700000000 HC OXYGEN THERAPY PER DAY

## 2025-05-25 RX ORDER — HYDRALAZINE HYDROCHLORIDE 20 MG/ML
10 INJECTION INTRAMUSCULAR; INTRAVENOUS EVERY 6 HOURS PRN
Status: DISCONTINUED | OUTPATIENT
Start: 2025-05-25 | End: 2025-05-30 | Stop reason: HOSPADM

## 2025-05-25 RX ORDER — ATORVASTATIN CALCIUM 10 MG/1
20 TABLET, FILM COATED ORAL DAILY
Status: DISCONTINUED | OUTPATIENT
Start: 2025-05-25 | End: 2025-05-27

## 2025-05-25 RX ORDER — FUROSEMIDE 10 MG/ML
40 INJECTION INTRAMUSCULAR; INTRAVENOUS 2 TIMES DAILY
Status: DISCONTINUED | OUTPATIENT
Start: 2025-05-25 | End: 2025-05-26

## 2025-05-25 RX ORDER — DILTIAZEM HYDROCHLORIDE 180 MG/1
180 CAPSULE, EXTENDED RELEASE ORAL DAILY
Status: DISCONTINUED | OUTPATIENT
Start: 2025-05-26 | End: 2025-05-25 | Stop reason: SDUPTHER

## 2025-05-25 RX ORDER — ONDANSETRON 4 MG/1
4 TABLET, ORALLY DISINTEGRATING ORAL EVERY 8 HOURS PRN
Status: DISCONTINUED | OUTPATIENT
Start: 2025-05-25 | End: 2025-05-30 | Stop reason: HOSPADM

## 2025-05-25 RX ORDER — MORPHINE SULFATE 2 MG/ML
2 INJECTION, SOLUTION INTRAMUSCULAR; INTRAVENOUS
Status: DISCONTINUED | OUTPATIENT
Start: 2025-05-25 | End: 2025-05-26

## 2025-05-25 RX ORDER — MORPHINE SULFATE 4 MG/ML
4 INJECTION, SOLUTION INTRAMUSCULAR; INTRAVENOUS
Status: DISCONTINUED | OUTPATIENT
Start: 2025-05-25 | End: 2025-05-26

## 2025-05-25 RX ORDER — DILTIAZEM HYDROCHLORIDE 180 MG/1
180 CAPSULE, COATED, EXTENDED RELEASE ORAL DAILY
Status: DISCONTINUED | OUTPATIENT
Start: 2025-05-26 | End: 2025-05-30 | Stop reason: HOSPADM

## 2025-05-25 RX ORDER — OXYCODONE HYDROCHLORIDE 5 MG/1
5 TABLET ORAL EVERY 4 HOURS PRN
Status: DISCONTINUED | OUTPATIENT
Start: 2025-05-25 | End: 2025-05-26

## 2025-05-25 RX ORDER — FENTANYL CITRATE 50 UG/ML
25 INJECTION, SOLUTION INTRAMUSCULAR; INTRAVENOUS ONCE
Refills: 0 | Status: COMPLETED | OUTPATIENT
Start: 2025-05-25 | End: 2025-05-25

## 2025-05-25 RX ORDER — ONDANSETRON 4 MG/1
4 TABLET, ORALLY DISINTEGRATING ORAL EVERY 8 HOURS PRN
Status: DISCONTINUED | OUTPATIENT
Start: 2025-05-25 | End: 2025-05-27 | Stop reason: SDUPTHER

## 2025-05-25 RX ORDER — HYDROXYZINE HYDROCHLORIDE 25 MG/1
25 TABLET, FILM COATED ORAL 3 TIMES DAILY
COMMUNITY

## 2025-05-25 RX ORDER — MEMANTINE HYDROCHLORIDE 5 MG/1
10 TABLET ORAL 2 TIMES DAILY
Status: DISCONTINUED | OUTPATIENT
Start: 2025-05-25 | End: 2025-05-27

## 2025-05-25 RX ORDER — ONDANSETRON 2 MG/ML
4 INJECTION INTRAMUSCULAR; INTRAVENOUS EVERY 6 HOURS PRN
Status: DISCONTINUED | OUTPATIENT
Start: 2025-05-25 | End: 2025-05-30 | Stop reason: HOSPADM

## 2025-05-25 RX ORDER — ACETAMINOPHEN 650 MG/1
650 SUPPOSITORY RECTAL EVERY 6 HOURS PRN
Status: DISCONTINUED | OUTPATIENT
Start: 2025-05-25 | End: 2025-05-30 | Stop reason: HOSPADM

## 2025-05-25 RX ORDER — LORAZEPAM 2 MG/ML
0.5 CONCENTRATE ORAL EVERY 4 HOURS PRN
COMMUNITY

## 2025-05-25 RX ORDER — MAGNESIUM SULFATE IN WATER 40 MG/ML
2000 INJECTION, SOLUTION INTRAVENOUS PRN
Status: DISCONTINUED | OUTPATIENT
Start: 2025-05-25 | End: 2025-05-30 | Stop reason: HOSPADM

## 2025-05-25 RX ORDER — OXYCODONE HYDROCHLORIDE 5 MG/1
2.5 TABLET ORAL EVERY 4 HOURS PRN
Status: DISCONTINUED | OUTPATIENT
Start: 2025-05-25 | End: 2025-05-26

## 2025-05-25 RX ORDER — SODIUM CHLORIDE 9 MG/ML
INJECTION, SOLUTION INTRAVENOUS CONTINUOUS
Status: DISCONTINUED | OUTPATIENT
Start: 2025-05-25 | End: 2025-05-29

## 2025-05-25 RX ORDER — ACETAMINOPHEN 325 MG/1
650 TABLET ORAL EVERY 6 HOURS PRN
Status: DISCONTINUED | OUTPATIENT
Start: 2025-05-25 | End: 2025-05-30 | Stop reason: HOSPADM

## 2025-05-25 RX ORDER — DILTIAZEM HYDROCHLORIDE 180 MG/1
180 CAPSULE, EXTENDED RELEASE ORAL DAILY
Status: DISCONTINUED | OUTPATIENT
Start: 2025-05-25 | End: 2025-05-25 | Stop reason: SDUPTHER

## 2025-05-25 RX ORDER — POLYETHYLENE GLYCOL 3350 17 G/17G
17 POWDER, FOR SOLUTION ORAL DAILY PRN
Status: DISCONTINUED | OUTPATIENT
Start: 2025-05-25 | End: 2025-05-30 | Stop reason: HOSPADM

## 2025-05-25 RX ORDER — LOSARTAN POTASSIUM 25 MG/1
50 TABLET ORAL DAILY
Status: DISCONTINUED | OUTPATIENT
Start: 2025-05-25 | End: 2025-05-30 | Stop reason: HOSPADM

## 2025-05-25 RX ORDER — METOPROLOL SUCCINATE 50 MG/1
50 TABLET, EXTENDED RELEASE ORAL DAILY
Status: DISCONTINUED | OUTPATIENT
Start: 2025-05-25 | End: 2025-05-30 | Stop reason: HOSPADM

## 2025-05-25 RX ORDER — DULOXETIN HYDROCHLORIDE 30 MG/1
30 CAPSULE, DELAYED RELEASE ORAL DAILY
Status: DISCONTINUED | OUTPATIENT
Start: 2025-05-25 | End: 2025-05-30 | Stop reason: HOSPADM

## 2025-05-25 RX ORDER — ATROPINE SULFATE 10 MG/ML
2 SOLUTION/ DROPS OPHTHALMIC EVERY 4 HOURS PRN
COMMUNITY

## 2025-05-25 RX ORDER — ONDANSETRON 2 MG/ML
4 INJECTION INTRAMUSCULAR; INTRAVENOUS EVERY 6 HOURS PRN
Status: DISCONTINUED | OUTPATIENT
Start: 2025-05-25 | End: 2025-05-27 | Stop reason: SDUPTHER

## 2025-05-25 RX ORDER — FUROSEMIDE 10 MG/ML
20 INJECTION INTRAMUSCULAR; INTRAVENOUS ONCE
Status: COMPLETED | OUTPATIENT
Start: 2025-05-25 | End: 2025-05-25

## 2025-05-25 RX ORDER — MORPHINE SULFATE 4 MG/ML
4 INJECTION, SOLUTION INTRAMUSCULAR; INTRAVENOUS ONCE
Refills: 0 | Status: COMPLETED | OUTPATIENT
Start: 2025-05-25 | End: 2025-05-25

## 2025-05-25 RX ADMIN — MORPHINE SULFATE 4 MG: 4 INJECTION, SOLUTION INTRAMUSCULAR; INTRAVENOUS at 17:47

## 2025-05-25 RX ADMIN — MORPHINE SULFATE 4 MG: 4 INJECTION, SOLUTION INTRAMUSCULAR; INTRAVENOUS at 14:53

## 2025-05-25 RX ADMIN — FUROSEMIDE 20 MG: 10 INJECTION, SOLUTION INTRAMUSCULAR; INTRAVENOUS at 14:53

## 2025-05-25 RX ADMIN — MORPHINE SULFATE 4 MG: 4 INJECTION, SOLUTION INTRAMUSCULAR; INTRAVENOUS at 12:47

## 2025-05-25 RX ADMIN — SODIUM CHLORIDE: 0.9 INJECTION, SOLUTION INTRAVENOUS at 16:25

## 2025-05-25 RX ADMIN — MORPHINE SULFATE 4 MG: 4 INJECTION, SOLUTION INTRAMUSCULAR; INTRAVENOUS at 23:57

## 2025-05-25 RX ADMIN — FENTANYL CITRATE 25 MCG: 50 INJECTION INTRAMUSCULAR; INTRAVENOUS at 12:19

## 2025-05-25 RX ADMIN — FUROSEMIDE 40 MG: 10 INJECTION, SOLUTION INTRAMUSCULAR; INTRAVENOUS at 17:46

## 2025-05-25 ASSESSMENT — PAIN DESCRIPTION - DESCRIPTORS
DESCRIPTORS: ACHING
DESCRIPTORS: ACHING;STABBING
DESCRIPTORS: ACHING
DESCRIPTORS: ACHING

## 2025-05-25 ASSESSMENT — PAIN SCALES - GENERAL
PAINLEVEL_OUTOF10: 9
PAINLEVEL_OUTOF10: 0
PAINLEVEL_OUTOF10: 10
PAINLEVEL_OUTOF10: 10
PAINLEVEL_OUTOF10: 0
PAINLEVEL_OUTOF10: 10

## 2025-05-25 ASSESSMENT — PAIN SCALES - PAIN ASSESSMENT IN ADVANCED DEMENTIA (PAINAD)
BODYLANGUAGE: TENSE, DISTRESSED PACING, FIDGETING
NEGVOCALIZATION: OCCASIONAL MOAN/GROAN, LOW SPEECH, NEGATIVE/DISAPPROVING QUALITY
FACIALEXPRESSION: SAD, FRIGHTENED, FROWN
TOTALSCORE: 5
BREATHING: OCCASIONAL LABORED BREATHING, SHORT PERIOD OF HYPERVENTILATION
CONSOLABILITY: DISTRACTED OR REASSURED BY VOICE/TOUCH

## 2025-05-25 ASSESSMENT — PAIN DESCRIPTION - LOCATION
LOCATION: HIP
LOCATION: LEG;HIP

## 2025-05-25 ASSESSMENT — PAIN DESCRIPTION - ORIENTATION
ORIENTATION: LEFT

## 2025-05-25 ASSESSMENT — PAIN - FUNCTIONAL ASSESSMENT
PAIN_FUNCTIONAL_ASSESSMENT: PREVENTS OR INTERFERES SOME ACTIVE ACTIVITIES AND ADLS
PAIN_FUNCTIONAL_ASSESSMENT: PAIN ASSESSMENT IN ADVANCED DEMENTIA (PAINAD)

## 2025-05-25 NOTE — ED NOTES
Perfect served @ [id number]  Per  Guille Pena PA  Re left hip fx  Ortho Dr. Quinteros spoke to Guille @0107

## 2025-05-25 NOTE — PLAN OF CARE
Note--     Received perfect serve message for possible admission . Communicated  with the ER attending.  Admitting orders placed after communication with the ER physician and as per data provided by ER physician,  patient will be seen and full H&P to follow, per protocol.

## 2025-05-25 NOTE — ED NOTES
Crystal Pandey is a 85 y.o. female admitted for  Principal Problem:    Hip fracture requiring operative repair, left, closed, initial encounter ([facility])  Resolved Problems:    * No resolved hospital problems. *  .   Patient SNF LTC via EMS transportation with   Chief Complaint   Patient presents with    Fall     From [facility] unit. Unwitnessed fall, someone heard a thump and staff found pt on the floor. C/o L hip pain. 50mcg fentanyl given IV en route. DNR-CC per paperwork   .  Patient is alert and Disoriented   Patient's baseline mobility: Baseline Mobility: has not ambulated, pt fell today at nursing facility  Code Status: Prior   Cardiac Rhythm:    O2 Flow Rate (L/min): 6 L/min  Is patient on baseline Oxygen: yes,  how many Liters2: currently on 6L  Abnormal Assessment Findings: left hip fracture, hypoxia after pain medication administration    Isolation: None      NIH Score:    C-SSRS:    Bedside swallow:        Active LDA's:   Peripheral IV 05/25/25 Right Antecubital (Active)   Site Assessment Clean, dry & intact 05/25/25 1140   Line Status Normal saline locked 05/25/25 1140   Phlebitis Assessment No symptoms 05/25/25 1140   Infiltration Assessment 0 05/25/25 1140   Alcohol Cap Used Yes 05/25/25 1140   Dressing Status Clean, dry & intact 05/25/25 1140   Dressing Type Transparent 05/25/25 1140           Family/Caregiver Present yes Any Concerns: no   Restraints no  Sitter no         Vitals:      Vitals:    05/25/25 1131 05/25/25 1139 05/25/25 1300 05/25/25 1315   BP:  138/61 114/64    Pulse:  58 58    Resp:  20 20    Temp:  97.7 °F (36.5 °C)     TempSrc:  Oral     SpO2:  90% 90% 90%   Weight: 72.6 kg (160 lb)      Height: 1.626 m (5' 4\")          Last documented pain score (0-10 scale) Pain Level: 10  Pain medication administered Yes- see MAR.    Pertinent or High Risk Medications/Drips: No.    Pending Blood Product Administration: no    Abnormal labs:   Abnormal Labs Reviewed   COMPREHENSIVE METABOLIC

## 2025-05-25 NOTE — PLAN OF CARE
Problem: Safety - Adult  Goal: Free from fall injury  Outcome: Progressing  Flowsheets (Taken 5/25/2025 1742)  Free From Fall Injury: Instruct family/caregiver on patient safety     Problem: Discharge Planning  Goal: Discharge to home or other facility with appropriate resources  Outcome: Progressing  Flowsheets  Taken 5/25/2025 1757  Discharge to home or other facility with appropriate resources: Arrange for needed discharge resources and transportation as appropriate  Taken 5/25/2025 1537  Discharge to home or other facility with appropriate resources: Identify barriers to discharge with patient and caregiver     Problem: Pain  Goal: Verbalizes/displays adequate comfort level or baseline comfort level  Outcome: Progressing     Problem: Skin/Tissue Integrity  Goal: Skin integrity remains intact  Description: 1.  Monitor for areas of redness and/or skin breakdown2.  Assess vascular access sites hourly3.  Every 4-6 hours minimum:  Change oxygen saturation probe site4.  Every 4-6 hours:  If on nasal continuous positive airway pressure, respiratory therapy assess nares and determine need for appliance change or resting period  Outcome: Progressing  Flowsheets  Taken 5/25/2025 1742  Skin Integrity Remains Intact: Monitor for areas of redness and/or skin breakdown  Taken 5/25/2025 1537  Skin Integrity Remains Intact: Monitor for areas of redness and/or skin breakdown     Problem: Confusion  Goal: Confusion, delirium, dementia, or psychosis is improved or at baseline  Description: INTERVENTIONS:1. Assess for possible contributors to thought disturbance, including medications, impaired vision or hearing, underlying metabolic abnormalities, dehydration, psychiatric diagnoses, and notify attending LIP2. [place] high risk fall precautions, as indicated3. Provide frequent short contacts to provide reality reorientation, refocusing and direction4. Decrease environmental stimuli, including noise as appropriate5. Monitor and

## 2025-05-25 NOTE — H&P
reviewed and interpreted for clinical significance.   Recent Labs     05/25/25  1145   WBC 9.3   HGB 13.1   HCT 39.5        Recent Labs     05/25/25  1145      K 4.5      CO2 26   BUN 17   CREATININE 0.9   CALCIUM 9.2     Recent Labs     05/25/25  1145   TROPHS 26*     No results for input(s): \"LABA1C\" in the last 72 hours.  Recent Labs     05/25/25  1145   AST 18   ALT 11   BILITOT 0.8   ALKPHOS 95     Recent Labs     05/25/25  1145   INR 1.09        Gwendolyn Gardner, APRN - CNP

## 2025-05-25 NOTE — ED PROVIDER NOTES
[facility] EMERGENCY DEPARTMENT  Emergency Department Encounter    Patient Name: Crystal Pandey  MRN: [id number]  YOB: 1940  Date of Evaluation: 5/25/2025  Provider: Alexa Ortiz MD  Note Started: 11:55 AM EDT 5/25/25    CHIEF COMPLAINT  Fall (From [facility] memory unit. Unwitnessed fall, someone heard a thump and staff found pt on the floor. C/o L hip pain. 50mcg fentanyl given IV en route. DNR-CC per paperwork)    SHARED SERVICE VISIT  Evaluated by HAYDEN.  My supervising physician was available for consultation.     HISTORY OF PRESENT ILLNESS  Crystal Pandey is a 85 y.o. female who presents to the ED for evaluation of fall and hip pain.  Patient with history of dementia.  In nursing facility.  Squad called for unwitnessed fall.  Staff reports they did hear her fall and downtime minimal.  Unsure of head injury.  Patient does appear to be in discomfort.  She is poor historian and unable to contribute to history outside of complaints of hip pain.    No other complaints, modifying factors or associated symptoms.     Nursing notes reviewed were all reviewed and agreed with or any disagreements were addressed in the HPI.    PMH:  Past Medical History:   Diagnosis Date    Alzheimer's dementia (HCC)     Atrial flutter (HCC)     s/p ablation    Atrial tachycardia     s/p ablation    CTS (carpal tunnel syndrome)     left    HLD (hyperlipidemia)     Hypertension     Mitral regurgitation 2016    moderate, also with aortic sclerosis per echo    Mixed hyperlipidemia 07/23/2018    Osteopenia 2013    Paroxysmal atrial fibrillation (HCC)     Pneumonia due to group B Streptococcus, unspecified laterality, unspecified part of lung 01/20/2023    Shingles 2012    Declines vaccine     Surgical History:  Past Surgical History:   Procedure Laterality Date    ABLATION OF DYSRHYTHMIC FOCUS  1/13/16    L-sided atypical A flutter, L-sided AT    CATARACT REMOVAL WITH IMPLANT Left 05/01/2018    COLONOSCOPY

## 2025-05-26 ENCOUNTER — APPOINTMENT (OUTPATIENT)
Dept: GENERAL RADIOLOGY | Age: 85
DRG: 480 | End: 2025-05-26
Payer: MEDICARE

## 2025-05-26 ENCOUNTER — ANESTHESIA (OUTPATIENT)
Dept: OPERATING ROOM | Age: 85
DRG: 480 | End: 2025-05-26
Payer: MEDICARE

## 2025-05-26 ENCOUNTER — ANESTHESIA EVENT (OUTPATIENT)
Dept: OPERATING ROOM | Age: 85
DRG: 480 | End: 2025-05-26
Payer: MEDICARE

## 2025-05-26 LAB
ANION GAP SERPL CALCULATED.3IONS-SCNC: 10 MMOL/L (ref 3–16)
BASOPHILS # BLD: 0.1 K/UL (ref 0–0.2)
BASOPHILS NFR BLD: 0.9 %
BUN SERPL-MCNC: 15 MG/DL (ref 7–20)
CALCIUM SERPL-MCNC: 8.9 MG/DL (ref 8.3–10.6)
CHLORIDE SERPL-SCNC: 104 MMOL/L (ref 99–110)
CO2 SERPL-SCNC: 26 MMOL/L (ref 21–32)
CREAT SERPL-MCNC: 1 MG/DL (ref 0.6–1.2)
DEPRECATED RDW RBC AUTO: 14.2 % (ref 12.4–15.4)
EOSINOPHIL # BLD: 0.2 K/UL (ref 0–0.6)
EOSINOPHIL NFR BLD: 2.7 %
GFR SERPLBLD CREATININE-BSD FMLA CKD-EPI: 55 ML/MIN/{1.73_M2}
GLUCOSE SERPL-MCNC: 111 MG/DL (ref 70–99)
HCT VFR BLD AUTO: 38.1 % (ref 36–48)
HGB BLD-MCNC: 12.8 G/DL (ref 12–16)
INR PPP: 1.12 (ref 0.85–1.15)
LYMPHOCYTES # BLD: 1 K/UL (ref 1–5.1)
LYMPHOCYTES NFR BLD: 10.7 %
MCH RBC QN AUTO: 30.4 PG (ref 26–34)
MCHC RBC AUTO-ENTMCNC: 33.4 G/DL (ref 31–36)
MCV RBC AUTO: 90.9 FL (ref 80–100)
MONOCYTES # BLD: 1.2 K/UL (ref 0–1.3)
MONOCYTES NFR BLD: 13.7 %
NEUTROPHILS # BLD: 6.4 K/UL (ref 1.7–7.7)
NEUTROPHILS NFR BLD: 72 %
PLATELET # BLD AUTO: 279 K/UL (ref 135–450)
PMV BLD AUTO: 8.2 FL (ref 5–10.5)
POTASSIUM SERPL-SCNC: 3.8 MMOL/L (ref 3.5–5.1)
PROTHROMBIN TIME: 14.6 SEC (ref 11.9–14.9)
RBC # BLD AUTO: 4.19 M/UL (ref 4–5.2)
SODIUM SERPL-SCNC: 140 MMOL/L (ref 136–145)
WBC # BLD AUTO: 8.9 K/UL (ref 4–11)

## 2025-05-26 PROCEDURE — 2580000003 HC RX 258: Performed by: STUDENT IN AN ORGANIZED HEALTH CARE EDUCATION/TRAINING PROGRAM

## 2025-05-26 PROCEDURE — 2580000003 HC RX 258: Performed by: NURSE PRACTITIONER

## 2025-05-26 PROCEDURE — 2709999900 HC NON-CHARGEABLE SUPPLY: Performed by: ORTHOPAEDIC SURGERY

## 2025-05-26 PROCEDURE — 85025 COMPLETE CBC W/AUTO DIFF WBC: CPT

## 2025-05-26 PROCEDURE — 3600000004 HC SURGERY LEVEL 4 BASE: Performed by: ORTHOPAEDIC SURGERY

## 2025-05-26 PROCEDURE — 27245 TREAT THIGH FRACTURE: CPT | Performed by: ORTHOPAEDIC SURGERY

## 2025-05-26 PROCEDURE — 85610 PROTHROMBIN TIME: CPT

## 2025-05-26 PROCEDURE — 6360000002 HC RX W HCPCS: Performed by: ORTHOPAEDIC SURGERY

## 2025-05-26 PROCEDURE — 2500000003 HC RX 250 WO HCPCS: Performed by: STUDENT IN AN ORGANIZED HEALTH CARE EDUCATION/TRAINING PROGRAM

## 2025-05-26 PROCEDURE — 7100000001 HC PACU RECOVERY - ADDTL 15 MIN: Performed by: ORTHOPAEDIC SURGERY

## 2025-05-26 PROCEDURE — 6360000002 HC RX W HCPCS: Performed by: NURSE PRACTITIONER

## 2025-05-26 PROCEDURE — 2720000010 HC SURG SUPPLY STERILE: Performed by: ORTHOPAEDIC SURGERY

## 2025-05-26 PROCEDURE — 94761 N-INVAS EAR/PLS OXIMETRY MLT: CPT

## 2025-05-26 PROCEDURE — 80048 BASIC METABOLIC PNL TOTAL CA: CPT

## 2025-05-26 PROCEDURE — 2580000003 HC RX 258: Performed by: ORTHOPAEDIC SURGERY

## 2025-05-26 PROCEDURE — 2500000003 HC RX 250 WO HCPCS: Performed by: ORTHOPAEDIC SURGERY

## 2025-05-26 PROCEDURE — 6360000002 HC RX W HCPCS: Performed by: STUDENT IN AN ORGANIZED HEALTH CARE EDUCATION/TRAINING PROGRAM

## 2025-05-26 PROCEDURE — 36415 COLL VENOUS BLD VENIPUNCTURE: CPT

## 2025-05-26 PROCEDURE — 2700000000 HC OXYGEN THERAPY PER DAY

## 2025-05-26 PROCEDURE — 73502 X-RAY EXAM HIP UNI 2-3 VIEWS: CPT

## 2025-05-26 PROCEDURE — 99222 1ST HOSP IP/OBS MODERATE 55: CPT | Performed by: ORTHOPAEDIC SURGERY

## 2025-05-26 PROCEDURE — 3700000000 HC ANESTHESIA ATTENDED CARE: Performed by: ORTHOPAEDIC SURGERY

## 2025-05-26 PROCEDURE — 3700000001 HC ADD 15 MINUTES (ANESTHESIA): Performed by: ORTHOPAEDIC SURGERY

## 2025-05-26 PROCEDURE — C1713 ANCHOR/SCREW BN/BN,TIS/BN: HCPCS | Performed by: ORTHOPAEDIC SURGERY

## 2025-05-26 PROCEDURE — 3600000014 HC SURGERY LEVEL 4 ADDTL 15MIN: Performed by: ORTHOPAEDIC SURGERY

## 2025-05-26 PROCEDURE — 0QS706Z REPOSITION LEFT UPPER FEMUR WITH INTRAMEDULLARY INTERNAL FIXATION DEVICE, OPEN APPROACH: ICD-10-PCS | Performed by: ORTHOPAEDIC SURGERY

## 2025-05-26 PROCEDURE — C1769 GUIDE WIRE: HCPCS | Performed by: ORTHOPAEDIC SURGERY

## 2025-05-26 PROCEDURE — 7100000000 HC PACU RECOVERY - FIRST 15 MIN: Performed by: ORTHOPAEDIC SURGERY

## 2025-05-26 PROCEDURE — 1200000000 HC SEMI PRIVATE

## 2025-05-26 DEVICE — TROCHANTERIC NAIL
Type: IMPLANTABLE DEVICE | Site: HIP | Status: FUNCTIONAL
Brand: GAMMA

## 2025-05-26 DEVICE — LAG SCREW
Type: IMPLANTABLE DEVICE | Site: HIP | Status: FUNCTIONAL
Brand: GAMMA

## 2025-05-26 DEVICE — LOCKING SCREW
Type: IMPLANTABLE DEVICE | Site: HIP | Status: FUNCTIONAL
Brand: T2 ALPHA

## 2025-05-26 RX ORDER — NALOXONE HYDROCHLORIDE 0.4 MG/ML
INJECTION, SOLUTION INTRAMUSCULAR; INTRAVENOUS; SUBCUTANEOUS PRN
Status: DISCONTINUED | OUTPATIENT
Start: 2025-05-26 | End: 2025-05-27 | Stop reason: HOSPADM

## 2025-05-26 RX ORDER — ROCURONIUM BROMIDE 10 MG/ML
INJECTION, SOLUTION INTRAVENOUS
Status: DISCONTINUED | OUTPATIENT
Start: 2025-05-26 | End: 2025-05-26 | Stop reason: SDUPTHER

## 2025-05-26 RX ORDER — EPHEDRINE SULFATE 50 MG/ML
INJECTION, SOLUTION INTRAVENOUS
Status: DISCONTINUED | OUTPATIENT
Start: 2025-05-26 | End: 2025-05-26 | Stop reason: SDUPTHER

## 2025-05-26 RX ORDER — CEFAZOLIN 2 G/1
INJECTION, POWDER, FOR SOLUTION INTRAVENOUS
Status: DISPENSED
Start: 2025-05-26 | End: 2025-05-26

## 2025-05-26 RX ORDER — ENOXAPARIN SODIUM 100 MG/ML
40 INJECTION SUBCUTANEOUS DAILY
Status: DISCONTINUED | OUTPATIENT
Start: 2025-05-27 | End: 2025-05-27

## 2025-05-26 RX ORDER — ONDANSETRON 2 MG/ML
INJECTION INTRAMUSCULAR; INTRAVENOUS
Status: DISCONTINUED | OUTPATIENT
Start: 2025-05-26 | End: 2025-05-26 | Stop reason: SDUPTHER

## 2025-05-26 RX ORDER — LIDOCAINE HYDROCHLORIDE 20 MG/ML
INJECTION, SOLUTION EPIDURAL; INFILTRATION; INTRACAUDAL; PERINEURAL
Status: DISCONTINUED | OUTPATIENT
Start: 2025-05-26 | End: 2025-05-26 | Stop reason: SDUPTHER

## 2025-05-26 RX ORDER — DROPERIDOL 2.5 MG/ML
0.62 INJECTION, SOLUTION INTRAMUSCULAR; INTRAVENOUS
Status: DISCONTINUED | OUTPATIENT
Start: 2025-05-26 | End: 2025-05-27 | Stop reason: HOSPADM

## 2025-05-26 RX ORDER — PHENYLEPHRINE HCL IN 0.9% NACL 1 MG/10 ML
SYRINGE (ML) INTRAVENOUS
Status: DISCONTINUED | OUTPATIENT
Start: 2025-05-26 | End: 2025-05-26 | Stop reason: SDUPTHER

## 2025-05-26 RX ORDER — KETOROLAC TROMETHAMINE 30 MG/ML
INJECTION, SOLUTION INTRAMUSCULAR; INTRAVENOUS
Status: DISCONTINUED | OUTPATIENT
Start: 2025-05-26 | End: 2025-05-26 | Stop reason: SDUPTHER

## 2025-05-26 RX ORDER — PROPOFOL 10 MG/ML
INJECTION, EMULSION INTRAVENOUS
Status: DISCONTINUED | OUTPATIENT
Start: 2025-05-26 | End: 2025-05-26 | Stop reason: SDUPTHER

## 2025-05-26 RX ORDER — OXYCODONE HYDROCHLORIDE 5 MG/1
10 TABLET ORAL PRN
Status: DISCONTINUED | OUTPATIENT
Start: 2025-05-26 | End: 2025-05-26 | Stop reason: SDUPTHER

## 2025-05-26 RX ORDER — SODIUM CHLORIDE 9 MG/ML
INJECTION, SOLUTION INTRAVENOUS
Status: DISCONTINUED | OUTPATIENT
Start: 2025-05-26 | End: 2025-05-26 | Stop reason: SDUPTHER

## 2025-05-26 RX ORDER — IPRATROPIUM BROMIDE AND ALBUTEROL SULFATE 2.5; .5 MG/3ML; MG/3ML
1 SOLUTION RESPIRATORY (INHALATION)
Status: DISCONTINUED | OUTPATIENT
Start: 2025-05-26 | End: 2025-05-27 | Stop reason: HOSPADM

## 2025-05-26 RX ORDER — SODIUM CHLORIDE 9 MG/ML
INJECTION, SOLUTION INTRAVENOUS PRN
Status: DISCONTINUED | OUTPATIENT
Start: 2025-05-26 | End: 2025-05-27 | Stop reason: HOSPADM

## 2025-05-26 RX ORDER — MORPHINE SULFATE 2 MG/ML
2 INJECTION, SOLUTION INTRAMUSCULAR; INTRAVENOUS
Status: DISCONTINUED | OUTPATIENT
Start: 2025-05-26 | End: 2025-05-27

## 2025-05-26 RX ORDER — FENTANYL CITRATE 50 UG/ML
25 INJECTION, SOLUTION INTRAMUSCULAR; INTRAVENOUS
Status: COMPLETED | OUTPATIENT
Start: 2025-05-26 | End: 2025-05-27

## 2025-05-26 RX ORDER — MAGNESIUM HYDROXIDE 1200 MG/15ML
LIQUID ORAL CONTINUOUS PRN
Status: COMPLETED | OUTPATIENT
Start: 2025-05-26 | End: 2025-05-26

## 2025-05-26 RX ORDER — MORPHINE SULFATE 2 MG/ML
2 INJECTION, SOLUTION INTRAMUSCULAR; INTRAVENOUS EVERY 4 HOURS PRN
Status: DISCONTINUED | OUTPATIENT
Start: 2025-05-26 | End: 2025-05-26

## 2025-05-26 RX ORDER — SODIUM CHLORIDE 0.9 % (FLUSH) 0.9 %
5-40 SYRINGE (ML) INJECTION EVERY 12 HOURS SCHEDULED
Status: DISCONTINUED | OUTPATIENT
Start: 2025-05-26 | End: 2025-05-27 | Stop reason: HOSPADM

## 2025-05-26 RX ORDER — LABETALOL HYDROCHLORIDE 5 MG/ML
10 INJECTION, SOLUTION INTRAVENOUS
Status: DISCONTINUED | OUTPATIENT
Start: 2025-05-26 | End: 2025-05-27 | Stop reason: HOSPADM

## 2025-05-26 RX ORDER — OXYCODONE HYDROCHLORIDE 5 MG/1
5 TABLET ORAL PRN
Status: DISCONTINUED | OUTPATIENT
Start: 2025-05-26 | End: 2025-05-26 | Stop reason: SDUPTHER

## 2025-05-26 RX ORDER — FUROSEMIDE 10 MG/ML
20 INJECTION INTRAMUSCULAR; INTRAVENOUS DAILY
Status: DISCONTINUED | OUTPATIENT
Start: 2025-05-27 | End: 2025-05-29

## 2025-05-26 RX ORDER — CEFAZOLIN SODIUM 1 G/3ML
INJECTION, POWDER, FOR SOLUTION INTRAMUSCULAR; INTRAVENOUS
Status: DISCONTINUED | OUTPATIENT
Start: 2025-05-26 | End: 2025-05-26 | Stop reason: SDUPTHER

## 2025-05-26 RX ORDER — OXYCODONE AND ACETAMINOPHEN 5; 325 MG/1; MG/1
1 TABLET ORAL EVERY 6 HOURS PRN
Status: DISCONTINUED | OUTPATIENT
Start: 2025-05-26 | End: 2025-05-27

## 2025-05-26 RX ORDER — OXYCODONE AND ACETAMINOPHEN 5; 325 MG/1; MG/1
2 TABLET ORAL EVERY 6 HOURS PRN
Status: DISCONTINUED | OUTPATIENT
Start: 2025-05-26 | End: 2025-05-27

## 2025-05-26 RX ORDER — FENTANYL CITRATE 50 UG/ML
INJECTION, SOLUTION INTRAMUSCULAR; INTRAVENOUS
Status: DISCONTINUED | OUTPATIENT
Start: 2025-05-26 | End: 2025-05-26 | Stop reason: SDUPTHER

## 2025-05-26 RX ORDER — DIPHENHYDRAMINE HYDROCHLORIDE 50 MG/ML
12.5 INJECTION, SOLUTION INTRAMUSCULAR; INTRAVENOUS
Status: DISCONTINUED | OUTPATIENT
Start: 2025-05-26 | End: 2025-05-27

## 2025-05-26 RX ORDER — SODIUM CHLORIDE 0.9 % (FLUSH) 0.9 %
5-40 SYRINGE (ML) INJECTION PRN
Status: DISCONTINUED | OUTPATIENT
Start: 2025-05-26 | End: 2025-05-27 | Stop reason: HOSPADM

## 2025-05-26 RX ORDER — DEXAMETHASONE SODIUM PHOSPHATE 4 MG/ML
INJECTION, SOLUTION INTRA-ARTICULAR; INTRALESIONAL; INTRAMUSCULAR; INTRAVENOUS; SOFT TISSUE
Status: DISCONTINUED | OUTPATIENT
Start: 2025-05-26 | End: 2025-05-26 | Stop reason: SDUPTHER

## 2025-05-26 RX ADMIN — DEXMEDETOMIDINE HYDROCHLORIDE 2 MCG: 100 INJECTION, SOLUTION INTRAVENOUS at 09:23

## 2025-05-26 RX ADMIN — SUGAMMADEX 200 MG: 100 INJECTION, SOLUTION INTRAVENOUS at 09:43

## 2025-05-26 RX ADMIN — MORPHINE SULFATE 2 MG: 2 INJECTION, SOLUTION INTRAMUSCULAR; INTRAVENOUS at 13:13

## 2025-05-26 RX ADMIN — EPHEDRINE SULFATE 10 MG: 50 INJECTION, SOLUTION INTRAVENOUS at 08:59

## 2025-05-26 RX ADMIN — KETOROLAC TROMETHAMINE 15 MG: 30 INJECTION, SOLUTION INTRAMUSCULAR; INTRAVENOUS at 09:23

## 2025-05-26 RX ADMIN — EPHEDRINE SULFATE 10 MG: 50 INJECTION, SOLUTION INTRAVENOUS at 09:19

## 2025-05-26 RX ADMIN — DEXMEDETOMIDINE HYDROCHLORIDE 2 MCG: 100 INJECTION, SOLUTION INTRAVENOUS at 09:27

## 2025-05-26 RX ADMIN — Medication 10 ML: at 13:13

## 2025-05-26 RX ADMIN — ROCURONIUM BROMIDE 40 MG: 50 INJECTION, SOLUTION INTRAVENOUS at 08:29

## 2025-05-26 RX ADMIN — ONDANSETRON 4 MG: 2 INJECTION INTRAMUSCULAR; INTRAVENOUS at 08:41

## 2025-05-26 RX ADMIN — DEXMEDETOMIDINE HYDROCHLORIDE 2 MCG: 100 INJECTION, SOLUTION INTRAVENOUS at 09:10

## 2025-05-26 RX ADMIN — DEXMEDETOMIDINE HYDROCHLORIDE 2 MCG: 100 INJECTION, SOLUTION INTRAVENOUS at 08:41

## 2025-05-26 RX ADMIN — SODIUM CHLORIDE, PRESERVATIVE FREE 10 ML: 5 INJECTION INTRAVENOUS at 20:16

## 2025-05-26 RX ADMIN — MORPHINE SULFATE 2 MG: 2 INJECTION, SOLUTION INTRAMUSCULAR; INTRAVENOUS at 20:15

## 2025-05-26 RX ADMIN — Medication 100 MCG: at 09:27

## 2025-05-26 RX ADMIN — DEXMEDETOMIDINE HYDROCHLORIDE 2 MCG: 100 INJECTION, SOLUTION INTRAVENOUS at 09:02

## 2025-05-26 RX ADMIN — SODIUM CHLORIDE: 9 INJECTION, SOLUTION INTRAVENOUS at 08:20

## 2025-05-26 RX ADMIN — MORPHINE SULFATE 4 MG: 4 INJECTION, SOLUTION INTRAMUSCULAR; INTRAVENOUS at 07:38

## 2025-05-26 RX ADMIN — Medication 100 MCG: at 08:37

## 2025-05-26 RX ADMIN — Medication 100 MCG: at 09:34

## 2025-05-26 RX ADMIN — LIDOCAINE HYDROCHLORIDE 100 MG: 20 INJECTION, SOLUTION EPIDURAL; INFILTRATION; INTRACAUDAL at 08:29

## 2025-05-26 RX ADMIN — Medication 200 MCG: at 08:51

## 2025-05-26 RX ADMIN — CEFAZOLIN 2 G: 1 INJECTION, POWDER, FOR SOLUTION INTRAMUSCULAR; INTRAVENOUS at 08:53

## 2025-05-26 RX ADMIN — MORPHINE SULFATE 2 MG: 2 INJECTION, SOLUTION INTRAMUSCULAR; INTRAVENOUS at 16:00

## 2025-05-26 RX ADMIN — Medication 100 MCG: at 08:30

## 2025-05-26 RX ADMIN — METHOCARBAMOL 200 MG: 100 INJECTION INTRAMUSCULAR; INTRAVENOUS at 09:13

## 2025-05-26 RX ADMIN — Medication 100 MCG: at 09:20

## 2025-05-26 RX ADMIN — FENTANYL CITRATE 25 MCG: 50 INJECTION INTRAMUSCULAR; INTRAVENOUS at 23:07

## 2025-05-26 RX ADMIN — DEXAMETHASONE SODIUM PHOSPHATE 4 MG: 4 INJECTION, SOLUTION INTRAMUSCULAR; INTRAVENOUS at 08:41

## 2025-05-26 RX ADMIN — METHOCARBAMOL 100 MG: 100 INJECTION INTRAMUSCULAR; INTRAVENOUS at 09:31

## 2025-05-26 RX ADMIN — MORPHINE SULFATE 2 MG: 2 INJECTION, SOLUTION INTRAMUSCULAR; INTRAVENOUS at 22:38

## 2025-05-26 RX ADMIN — MORPHINE SULFATE 4 MG: 4 INJECTION, SOLUTION INTRAMUSCULAR; INTRAVENOUS at 05:32

## 2025-05-26 RX ADMIN — SUGAMMADEX 200 MG: 100 INJECTION, SOLUTION INTRAVENOUS at 09:54

## 2025-05-26 RX ADMIN — CEFAZOLIN 2000 MG: 2 INJECTION, POWDER, FOR SOLUTION INTRAVENOUS at 17:17

## 2025-05-26 RX ADMIN — Medication 200 MCG: at 08:47

## 2025-05-26 RX ADMIN — FENTANYL CITRATE 25 MCG: 50 INJECTION, SOLUTION INTRAMUSCULAR; INTRAVENOUS at 08:50

## 2025-05-26 RX ADMIN — SODIUM CHLORIDE: 0.9 INJECTION, SOLUTION INTRAVENOUS at 05:43

## 2025-05-26 RX ADMIN — PROPOFOL 80 MG: 10 INJECTION, EMULSION INTRAVENOUS at 08:29

## 2025-05-26 RX ADMIN — Medication 10 ML: at 17:14

## 2025-05-26 ASSESSMENT — PAIN DESCRIPTION - DESCRIPTORS
DESCRIPTORS: ACHING;SPASM;STABBING
DESCRIPTORS: ACHING
DESCRIPTORS: ACHING;SPASM;SHOOTING
DESCRIPTORS: ACHING;SPASM;STABBING
DESCRIPTORS: ACHING
DESCRIPTORS: ACHING

## 2025-05-26 ASSESSMENT — PAIN SCALES - PAIN ASSESSMENT IN ADVANCED DEMENTIA (PAINAD)
TOTALSCORE: 2
BREATHING: NORMAL
BODYLANGUAGE: RELAXED
FACIALEXPRESSION: FACIAL GRIMACING
CONSOLABILITY: NO NEED TO CONSOLE

## 2025-05-26 ASSESSMENT — PAIN DESCRIPTION - LOCATION
LOCATION: HIP
LOCATION: HIP;LEG
LOCATION: LEG;HIP
LOCATION: HIP

## 2025-05-26 ASSESSMENT — PAIN SCALES - WONG BAKER
WONGBAKER_NUMERICALRESPONSE: HURTS WORST
WONGBAKER_NUMERICALRESPONSE: HURTS WORST

## 2025-05-26 ASSESSMENT — PAIN - FUNCTIONAL ASSESSMENT

## 2025-05-26 ASSESSMENT — PAIN DESCRIPTION - ORIENTATION
ORIENTATION: LEFT

## 2025-05-26 ASSESSMENT — PAIN SCALES - GENERAL
PAINLEVEL_OUTOF10: 8
PAINLEVEL_OUTOF10: 10
PAINLEVEL_OUTOF10: 0

## 2025-05-26 NOTE — ANESTHESIA POSTPROCEDURE EVALUATION
Department of Anesthesiology  Postprocedure Note    Patient: Crystal Pandey  MRN: [id number]  YOB: 1940  Date of evaluation: 5/26/2025    Procedure Summary       Date: 05/26/25 Room / Location: [address]    Anesthesia Start: 0820 Anesthesia Stop: 1003    Procedure: LEFT HIP CEPHALOMEDULLARY NAIL (Left: Hip) Diagnosis:       Closed fracture of left hip, initial encounter ([facility])      (Closed fracture of left hip, initial encounter ([facility]) [S72.002A])    Surgeons: Humphrey Quinteros MD Responsible Provider: Pavel Vaca MD    Anesthesia Type: general ASA Status: 3            Anesthesia Type: No value filed.    Brant Phase I: Brant Score: 8    Brant Phase II:      Anesthesia Post Evaluation    Comments: Postoperative Anesthesia Note    Name:    Crystal Pandey  MRN:      [id number]    Patient Vitals in the past 12 hrs:  05/26/25 1016, BP:114/71, Temp:97 °F (36.1 °C), Temp src:Temporal, Pulse:76, Resp:16, SpO2:95 %  05/26/25 1012, BP:(!) 98/50, Pulse:70, SpO2:94 %  05/26/25 1005, BP:(!) 94/50, Temp:97 °F (36.1 °C), Temp src:Temporal, Pulse:70, Resp:16, SpO2:94 %  05/26/25 0738, BP:131/62, Temp:99.5 °F (37.5 °C), Temp src:Axillary, Pulse:83, Resp:18, SpO2:94 %  05/26/25 0645, BP:(!) 122/57, Pulse:84, Resp:20, SpO2:94 %  05/26/25 0515, BP:(!) 126/59, Temp:97.5 °F (36.4 °C), Temp src:Axillary, Pulse:81, Resp:20, SpO2:93 %  05/26/25 0334, BP:121/63, Temp:99 °F (37.2 °C), Temp src:Axillary, Pulse:76, Resp:20, SpO2:95 %  05/25/25 2343, BP:(!) 129/92, Temp:97.9 °F (36.6 °C), Temp src:Axillary, Pulse:72, Resp:20, SpO2:94 %     LABS:    CBC  Lab Results       Component                Value               Date/Time                  WBC                      8.9                 05/26/2025 05:59 AM        HGB                      12.8                05/26/2025 05:59 AM        HCT                      38.1                05/26/2025 05:59 AM        PLT                      279

## 2025-05-26 NOTE — PLAN OF CARE
Problem: Safety - Adult  Goal: Free from fall injury  Outcome: Progressing   Pt and family instructed to use call light for assistance. Bed in low position, 2/4 side rails up, avasure, bed alarm engaged, and call light within reach.

## 2025-05-26 NOTE — OP NOTE
guidewire for the cephalomedullary screw, which was measured and then carefully inserted.  The appropriate position of the fixation was confirmed on orthogonal views of the femoral head.  A set screw was used proximally at the end of the nail. The distal interlocking screw was then inserted using the targeting guide in a statically locked position.  The proximal jig and targeting arm were then removed, and final fluoroscopic images including orthogonal views of the hip and proximal femur were obtained.        Copious sterile irrigation was used to rinse the operative sites, and a layered closure was performed using 0 vicryl to close the muscle fascia, 2-0 vicryl and staples for the skin, providing appropriate tension to the skin. The skin was cleaned and gently dried. The incisions were then covered with sterile dressings.    The patient was aroused from anesthesia without complication, and gently transferred to the bed and then transported to the postoperative area in a stable condition. The patient was noted to have tolerated the procedure well without complication.      Postoperative Plan:  Assessment & Plan       Precautions: Weightbearing as tolerated on the Left lower extremity   -             [x]  Physical Therapy/Home exercises        DVT ppx:   [x]  Early mobilization       [x]  Medication as prescribed (Lovenox)      []  No chemical ppx needed; mechanical only   -    Pain control:  Medication as prescribed (dosing and quantity) indicated for acute postoperative pain control   -    Special concerns:       [x]  DNR/hospice care      [x]  Avoid strenuous activity/pain provoking maneuvers and high-impact repetitive exercises    -    Disposition:   PACU      The patient has been instructed to follow up in the office.    The particulars of surgery as well as the condition of the patient postoperatively were discussed with the patient’s family thereafter per the patient's wishes.           Brody Quinteros,

## 2025-05-26 NOTE — CONSULTS
Consult PerfectServed/called to Cardiology on 05/25/25 at 2:29 PM Susan Escalante  
appear uncomfortable.  As a result of our discussion including risks/benefits/alternatives, they are able to make an informed decision, and have elected to proceed with surgical management.  Informed consent is obtained from the patient's daughter.    We spoke about the risks/benefits/alternatives to a surgical intervention. They understand that the risks of surgery may include but are not limited to pain, infection, bleeding, blood clot, damage to soft tissue/vessel/nerve, future surgery, scarring/stiffness, decreased strength/weakness, cosmetic deformity, neuroma/neuritis/phantom pains, delayed soft tissue/bone healing, nonunion, malunion, failure of hardware/fixation/surgery, iatrogenic fracture/dislocation, damage to bone/joint(s), worsening of condition, recurrence, limb length discrepancy, avascular necrosis of bone, recurrent dislocation, neurovascular compromise/compartment syndrome, tourniquet complications, failure of surgery, dissatisfaction with outcome, loss of limb, stroke, heart attack, pulmonary embolus, mental status change, anesthesia risks/reaction, and even death. They expressed verbal understanding of the risks and wish to proceed with surgical intervention. All questions were answered. No guarantees were made or implied. Informed consent was obtained.     I had multiple conversations with Dr. Vaca of anesthesia this morning.  We have decided to proceed with surgery, as further cardiac workup will not , as the goal of surgery is ultimately pain control, and not to extend her life, in accordance with her wishes.      -DVT prophylaxis  -pain control  -Medical optimization requested    -Cardiology consulted, at bedside, and discussed that we will not proceed with cardiac echo at this time, as this would not .  The patient is high risk.  -NPO now  -The plan is to proceed with the following:  left hip CMN    All questions were answered and the patient agrees with

## 2025-05-26 NOTE — ANESTHESIA PRE PROCEDURE
TempSrc: Axillary Axillary Axillary    SpO2: 94% 95% 93% 94%   Weight:       Height:                                                  BP Readings from Last 3 Encounters:   05/26/25 (!) 122/57   12/09/24 130/87   12/09/24 116/62       NPO Status:                                                                                 BMI:   Wt Readings from Last 3 Encounters:   05/25/25 72.6 kg (160 lb)   12/09/24 75.1 kg (165 lb 9.6 oz)   12/02/24 76 kg (167 lb 9.6 oz)     Body mass index is 27.46 kg/m².    CBC:   Lab Results   Component Value Date/Time    WBC 8.9 05/26/2025 05:59 AM    RBC 4.19 05/26/2025 05:59 AM    HGB 12.8 05/26/2025 05:59 AM    HCT 38.1 05/26/2025 05:59 AM    MCV 90.9 05/26/2025 05:59 AM    RDW 14.2 05/26/2025 05:59 AM     05/26/2025 05:59 AM       CMP:   Lab Results   Component Value Date/Time     05/26/2025 05:59 AM    K 3.8 05/26/2025 05:59 AM     05/26/2025 05:59 AM    CO2 26 05/26/2025 05:59 AM    BUN 15 05/26/2025 05:59 AM    CREATININE 1.0 05/26/2025 05:59 AM    GFRAA >60 01/26/2022 12:23 PM    AGRATIO 1.5 05/25/2025 11:45 AM    LABGLOM 55 05/26/2025 05:59 AM    LABGLOM 32 04/30/2024 04:27 PM    GLUCOSE 111 05/26/2025 05:59 AM    CALCIUM 8.9 05/26/2025 05:59 AM    BILITOT 0.8 05/25/2025 11:45 AM    ALKPHOS 95 05/25/2025 11:45 AM    AST 18 05/25/2025 11:45 AM    ALT 11 05/25/2025 11:45 AM       POC Tests: No results for input(s): \"POCGLU\", \"POCNA\", \"POCK\", \"POCCL\", \"POCBUN\", \"POCHEMO\", \"POCHCT\" in the last 72 hours.    Coags:   Lab Results   Component Value Date/Time    PROTIME 14.6 05/26/2025 05:59 AM    INR 1.12 05/26/2025 05:59 AM       HCG (If Applicable): No results found for: \"PREGTESTUR\", \"PREGSERUM\", \"HCG\", \"HCGQUANT\"     ABGs: No results found for: \"PHART\", \"PO2ART\", \"PFJ8OPN\", \"TXV7WKO\", \"BEART\", \"J6VVQKKN\"     Type & Screen (If Applicable):  Lab Results   Component Value Date    ABORH O POS 05/25/2025    LABANTI NEG 05/25/2025       Drug/Infectious Status (If

## 2025-05-26 NOTE — PLAN OF CARE
Bed alarm activated for safety. Near the nurses station. Call light in reach. Door open. Avasys in room to monitor.   Pain managed by PRN pain medication.   Pillow support and monitoring to signs of skin breakdown.     Problem: Safety - Adult  Goal: Free from fall injury  5/25/2025 2137 by Beth Hancock, RN  Outcome: Progressing  5/25/2025 1826 by Jacqueline Perez, RN  Outcome: Progressing  Flowsheets (Taken 5/25/2025 1742)  Free From Fall Injury: Instruct family/caregiver on patient safety     Problem: Pain  Goal: Verbalizes/displays adequate comfort level or baseline comfort level  5/25/2025 2137 by Beth Hancock, RN  Outcome: Progressing  5/25/2025 1826 by Jacqueline Perez RN  Outcome: Progressing     Problem: Skin/Tissue Integrity  Goal: Skin integrity remains intact  Description: 1.  Monitor for areas of redness and/or skin breakdown2.  Assess vascular access sites hourly3.  Every 4-6 hours minimum:  Change oxygen saturation probe site4.  Every 4-6 hours:  If on nasal continuous positive airway pressure, respiratory therapy assess nares and determine need for appliance change or resting period  5/25/2025 2137 by Beth Hancock, RN  Outcome: Progressing  Flowsheets (Taken 5/25/2025 2136)  Skin Integrity Remains Intact:   Monitor for areas of redness and/or skin breakdown   Turn and reposition as indicated   Positioning devices   Check visual cues for pain   Monitor skin under medical devices  5/25/2025 1826 by Jacqueline Perez, RN  Outcome: Progressing  Flowsheets  Taken 5/25/2025 1742  Skin Integrity Remains Intact: Monitor for areas of redness and/or skin breakdown  Taken 5/25/2025 1537  Skin Integrity Remains Intact: Monitor for areas of redness and/or skin breakdown

## 2025-05-27 ENCOUNTER — CARE COORDINATION (OUTPATIENT)
Dept: CASE MANAGEMENT | Age: 85
End: 2025-05-27

## 2025-05-27 ENCOUNTER — APPOINTMENT (OUTPATIENT)
Dept: GENERAL RADIOLOGY | Age: 85
DRG: 480 | End: 2025-05-27
Payer: MEDICARE

## 2025-05-27 LAB
ANION GAP SERPL CALCULATED.3IONS-SCNC: 11 MMOL/L (ref 3–16)
BUN SERPL-MCNC: 27 MG/DL (ref 7–20)
CALCIUM SERPL-MCNC: 9.2 MG/DL (ref 8.3–10.6)
CHLORIDE SERPL-SCNC: 104 MMOL/L (ref 99–110)
CO2 SERPL-SCNC: 27 MMOL/L (ref 21–32)
CREAT SERPL-MCNC: 1.2 MG/DL (ref 0.6–1.2)
DEPRECATED RDW RBC AUTO: 14.3 % (ref 12.4–15.4)
EKG ATRIAL RATE: 66 BPM
EKG DIAGNOSIS: NORMAL
EKG P AXIS: 78 DEGREES
EKG P-R INTERVAL: 224 MS
EKG Q-T INTERVAL: 450 MS
EKG QRS DURATION: 94 MS
EKG QTC CALCULATION (BAZETT): 471 MS
EKG R AXIS: 114 DEGREES
EKG T AXIS: 116 DEGREES
EKG VENTRICULAR RATE: 66 BPM
GFR SERPLBLD CREATININE-BSD FMLA CKD-EPI: 44 ML/MIN/{1.73_M2}
GLUCOSE SERPL-MCNC: 117 MG/DL (ref 70–99)
HCT VFR BLD AUTO: 35.7 % (ref 36–48)
HGB BLD-MCNC: 11.6 G/DL (ref 12–16)
MCH RBC QN AUTO: 29.8 PG (ref 26–34)
MCHC RBC AUTO-ENTMCNC: 32.6 G/DL (ref 31–36)
MCV RBC AUTO: 91.5 FL (ref 80–100)
PLATELET # BLD AUTO: 274 K/UL (ref 135–450)
PMV BLD AUTO: 8.2 FL (ref 5–10.5)
POTASSIUM SERPL-SCNC: 4.4 MMOL/L (ref 3.5–5.1)
RBC # BLD AUTO: 3.9 M/UL (ref 4–5.2)
SODIUM SERPL-SCNC: 142 MMOL/L (ref 136–145)
WBC # BLD AUTO: 14.1 K/UL (ref 4–11)

## 2025-05-27 PROCEDURE — 36415 COLL VENOUS BLD VENIPUNCTURE: CPT

## 2025-05-27 PROCEDURE — 6360000002 HC RX W HCPCS: Performed by: NURSE PRACTITIONER

## 2025-05-27 PROCEDURE — 97530 THERAPEUTIC ACTIVITIES: CPT

## 2025-05-27 PROCEDURE — 6360000002 HC RX W HCPCS: Performed by: SPECIALIST/TECHNOLOGIST

## 2025-05-27 PROCEDURE — 6370000000 HC RX 637 (ALT 250 FOR IP): Performed by: ORTHOPAEDIC SURGERY

## 2025-05-27 PROCEDURE — 73502 X-RAY EXAM HIP UNI 2-3 VIEWS: CPT

## 2025-05-27 PROCEDURE — 97167 OT EVAL HIGH COMPLEX 60 MIN: CPT

## 2025-05-27 PROCEDURE — 80048 BASIC METABOLIC PNL TOTAL CA: CPT

## 2025-05-27 PROCEDURE — 1200000000 HC SEMI PRIVATE

## 2025-05-27 PROCEDURE — 93010 ELECTROCARDIOGRAM REPORT: CPT | Performed by: INTERNAL MEDICINE

## 2025-05-27 PROCEDURE — 97162 PT EVAL MOD COMPLEX 30 MIN: CPT

## 2025-05-27 PROCEDURE — 85027 COMPLETE CBC AUTOMATED: CPT

## 2025-05-27 PROCEDURE — 2700000000 HC OXYGEN THERAPY PER DAY

## 2025-05-27 PROCEDURE — 2500000003 HC RX 250 WO HCPCS: Performed by: ORTHOPAEDIC SURGERY

## 2025-05-27 PROCEDURE — 6370000000 HC RX 637 (ALT 250 FOR IP): Performed by: SPECIALIST/TECHNOLOGIST

## 2025-05-27 PROCEDURE — 2580000003 HC RX 258: Performed by: ORTHOPAEDIC SURGERY

## 2025-05-27 PROCEDURE — 94761 N-INVAS EAR/PLS OXIMETRY MLT: CPT

## 2025-05-27 PROCEDURE — 6360000002 HC RX W HCPCS: Performed by: ORTHOPAEDIC SURGERY

## 2025-05-27 RX ORDER — DIPHENHYDRAMINE HYDROCHLORIDE 50 MG/ML
25 INJECTION, SOLUTION INTRAMUSCULAR; INTRAVENOUS EVERY 6 HOURS PRN
Status: DISCONTINUED | OUTPATIENT
Start: 2025-05-27 | End: 2025-05-30 | Stop reason: HOSPADM

## 2025-05-27 RX ORDER — OXYCODONE HCL 5 MG/5 ML
10 SOLUTION, ORAL ORAL EVERY 4 HOURS PRN
Refills: 0 | Status: DISCONTINUED | OUTPATIENT
Start: 2025-05-27 | End: 2025-05-30 | Stop reason: HOSPADM

## 2025-05-27 RX ORDER — MORPHINE SULFATE 2 MG/ML
2 INJECTION, SOLUTION INTRAMUSCULAR; INTRAVENOUS EVERY 4 HOURS PRN
Status: DISCONTINUED | OUTPATIENT
Start: 2025-05-27 | End: 2025-05-30 | Stop reason: HOSPADM

## 2025-05-27 RX ORDER — OXYCODONE HCL 5 MG/5 ML
5 SOLUTION, ORAL ORAL EVERY 4 HOURS PRN
Refills: 0 | Status: DISCONTINUED | OUTPATIENT
Start: 2025-05-27 | End: 2025-05-30 | Stop reason: HOSPADM

## 2025-05-27 RX ORDER — DIPHENHYDRAMINE HYDROCHLORIDE 50 MG/ML
25 INJECTION, SOLUTION INTRAMUSCULAR; INTRAVENOUS EVERY 6 HOURS PRN
Status: DISCONTINUED | OUTPATIENT
Start: 2025-05-27 | End: 2025-05-27 | Stop reason: HOSPADM

## 2025-05-27 RX ORDER — DIPHENHYDRAMINE HCL 25 MG
50 TABLET ORAL EVERY 6 HOURS PRN
Status: DISCONTINUED | OUTPATIENT
Start: 2025-05-27 | End: 2025-05-27

## 2025-05-27 RX ORDER — OXYCODONE HCL 5 MG/5 ML
5 SOLUTION, ORAL ORAL EVERY 4 HOURS PRN
Qty: 210 ML | Refills: 0 | Status: SHIPPED | OUTPATIENT
Start: 2025-05-27 | End: 2025-06-03

## 2025-05-27 RX ORDER — ENOXAPARIN SODIUM 100 MG/ML
40 INJECTION SUBCUTANEOUS DAILY
Qty: 16.8 ML | Refills: 0 | Status: SHIPPED | OUTPATIENT
Start: 2025-05-28 | End: 2025-07-09

## 2025-05-27 RX ORDER — BISACODYL 10 MG
10 SUPPOSITORY, RECTAL RECTAL DAILY PRN
Status: DISCONTINUED | OUTPATIENT
Start: 2025-05-27 | End: 2025-05-30 | Stop reason: HOSPADM

## 2025-05-27 RX ORDER — ENOXAPARIN SODIUM 100 MG/ML
40 INJECTION SUBCUTANEOUS DAILY
Status: DISCONTINUED | OUTPATIENT
Start: 2025-05-27 | End: 2025-05-30 | Stop reason: HOSPADM

## 2025-05-27 RX ORDER — LIDOCAINE 4 G/G
1 PATCH TOPICAL DAILY
Status: DISCONTINUED | OUTPATIENT
Start: 2025-05-27 | End: 2025-05-30 | Stop reason: HOSPADM

## 2025-05-27 RX ORDER — ENOXAPARIN SODIUM 100 MG/ML
40 INJECTION SUBCUTANEOUS DAILY
Status: DISCONTINUED | OUTPATIENT
Start: 2025-05-28 | End: 2025-05-27

## 2025-05-27 RX ORDER — BISACODYL 10 MG
10 SUPPOSITORY, RECTAL RECTAL DAILY PRN
Qty: 30 SUPPOSITORY | Refills: 0 | Status: SHIPPED | OUTPATIENT
Start: 2025-05-27 | End: 2025-06-26

## 2025-05-27 RX ORDER — LIDOCAINE 4 G/G
1 PATCH TOPICAL DAILY
Qty: 14 EACH | Refills: 0 | Status: SHIPPED | OUTPATIENT
Start: 2025-05-27 | End: 2025-06-10

## 2025-05-27 RX ORDER — ACETAMINOPHEN 650 MG/1
650 SUPPOSITORY RECTAL EVERY 6 HOURS PRN
Qty: 120 SUPPOSITORY | Refills: 0 | Status: SHIPPED | OUTPATIENT
Start: 2025-05-27 | End: 2025-06-26

## 2025-05-27 RX ADMIN — FUROSEMIDE 20 MG: 10 INJECTION, SOLUTION INTRAMUSCULAR; INTRAVENOUS at 09:41

## 2025-05-27 RX ADMIN — CEFAZOLIN 2000 MG: 2 INJECTION, POWDER, FOR SOLUTION INTRAVENOUS at 01:33

## 2025-05-27 RX ADMIN — SODIUM CHLORIDE, PRESERVATIVE FREE 10 ML: 5 INJECTION INTRAVENOUS at 09:42

## 2025-05-27 RX ADMIN — DIPHENHYDRAMINE HYDROCHLORIDE 25 MG: 50 INJECTION INTRAMUSCULAR; INTRAVENOUS at 16:49

## 2025-05-27 RX ADMIN — ENOXAPARIN SODIUM 40 MG: 100 INJECTION SUBCUTANEOUS at 13:52

## 2025-05-27 RX ADMIN — OXYCODONE HYDROCHLORIDE 10 MG: 5 SOLUTION ORAL at 18:12

## 2025-05-27 RX ADMIN — DIPHENHYDRAMINE HYDROCHLORIDE 25 MG: 50 INJECTION INTRAMUSCULAR; INTRAVENOUS at 22:58

## 2025-05-27 RX ADMIN — DIPHENHYDRAMINE HYDROCHLORIDE 25 MG: 50 INJECTION INTRAMUSCULAR; INTRAVENOUS at 09:12

## 2025-05-27 RX ADMIN — DILTIAZEM HYDROCHLORIDE 180 MG: 180 CAPSULE, EXTENDED RELEASE ORAL at 13:56

## 2025-05-27 RX ADMIN — FENTANYL CITRATE 25 MCG: 50 INJECTION INTRAMUSCULAR; INTRAVENOUS at 08:08

## 2025-05-27 RX ADMIN — MORPHINE SULFATE 2 MG: 2 INJECTION, SOLUTION INTRAMUSCULAR; INTRAVENOUS at 01:13

## 2025-05-27 RX ADMIN — FENTANYL CITRATE 25 MCG: 50 INJECTION INTRAMUSCULAR; INTRAVENOUS at 13:20

## 2025-05-27 RX ADMIN — MORPHINE SULFATE 2 MG: 2 INJECTION, SOLUTION INTRAMUSCULAR; INTRAVENOUS at 04:47

## 2025-05-27 RX ADMIN — DIPHENHYDRAMINE HYDROCHLORIDE 25 MG: 50 INJECTION INTRAMUSCULAR; INTRAVENOUS at 02:59

## 2025-05-27 RX ADMIN — METOPROLOL SUCCINATE 50 MG: 50 TABLET, FILM COATED, EXTENDED RELEASE ORAL at 14:02

## 2025-05-27 RX ADMIN — FENTANYL CITRATE 25 MCG: 50 INJECTION INTRAMUSCULAR; INTRAVENOUS at 15:27

## 2025-05-27 ASSESSMENT — PAIN SCALES - PAIN ASSESSMENT IN ADVANCED DEMENTIA (PAINAD)
CONSOLABILITY: NO NEED TO CONSOLE
CONSOLABILITY: NO NEED TO CONSOLE
BREATHING: NORMAL
FACIALEXPRESSION: SMILING OR INEXPRESSIVE
FACIALEXPRESSION: SMILING OR INEXPRESSIVE
BODYLANGUAGE: RELAXED
TOTALSCORE: 0
TOTALSCORE: 3
CONSOLABILITY: NO NEED TO CONSOLE
BODYLANGUAGE: TENSE, DISTRESSED PACING, FIDGETING
BREATHING: NORMAL
BODYLANGUAGE: RELAXED
BREATHING: OCCASIONAL LABORED BREATHING, SHORT PERIOD OF HYPERVENTILATION
FACIALEXPRESSION: FACIAL GRIMACING
BREATHING: NORMAL
FACIALEXPRESSION: FACIAL GRIMACING
BODYLANGUAGE: TENSE, DISTRESSED PACING, FIDGETING
TOTALSCORE: 0
TOTALSCORE: 7
NEGVOCALIZATION: OCCASIONAL MOAN/GROAN, LOW SPEECH, NEGATIVE/DISAPPROVING QUALITY
CONSOLABILITY: UNABLE TO CONSOLE, DISTRACT OR REASSURE

## 2025-05-27 ASSESSMENT — PAIN DESCRIPTION - ORIENTATION: ORIENTATION: LEFT

## 2025-05-27 ASSESSMENT — PAIN - FUNCTIONAL ASSESSMENT: PAIN_FUNCTIONAL_ASSESSMENT: PREVENTS OR INTERFERES SOME ACTIVE ACTIVITIES AND ADLS

## 2025-05-27 ASSESSMENT — PAIN DESCRIPTION - LOCATION: LOCATION: HIP

## 2025-05-27 ASSESSMENT — PAIN DESCRIPTION - DESCRIPTORS: DESCRIPTORS: ACHING

## 2025-05-27 NOTE — PLAN OF CARE
Problem: Safety - Adult  Goal: Free from fall injury  5/27/2025 1000 by Suzy Mirza RN  Outcome: Progressing  5/27/2025 0316 by Beth Hancock RN  Outcome: Progressing     Problem: Discharge Planning  Goal: Discharge to home or other facility with appropriate resources  5/27/2025 1000 by Suzy Mirza RN  Outcome: Progressing  Flowsheets (Taken 5/27/2025 0943)  Discharge to home or other facility with appropriate resources: Identify barriers to discharge with patient and caregiver  5/27/2025 0316 by Beth Hancock RN  Outcome: Progressing     Problem: Pain  Goal: Verbalizes/displays adequate comfort level or baseline comfort level  5/27/2025 1000 by Suzy Mirza RN  Outcome: Progressing  5/27/2025 0316 by Beth Hancock RN  Outcome: Progressing     Problem: Skin/Tissue Integrity  Goal: Skin integrity remains intact  Description: 1.  Monitor for areas of redness and/or skin breakdown2.  Assess vascular access sites hourly3.  Every 4-6 hours minimum:  Change oxygen saturation probe site4.  Every 4-6 hours:  If on nasal continuous positive airway pressure, respiratory therapy assess nares and determine need for appliance change or resting period  5/27/2025 1000 by Suzy Mirza RN  Outcome: Progressing  5/27/2025 0316 by Beth Hancock RN  Outcome: Progressing  Flowsheets (Taken 5/27/2025 0315)  Skin Integrity Remains Intact:   Monitor for areas of redness and/or skin breakdown   Turn and reposition as indicated   Positioning devices   Monitor skin under medical devices     Problem: Confusion  Goal: Confusion, delirium, dementia, or psychosis is improved or at baseline  Description: INTERVENTIONS:1. Assess for possible contributors to thought disturbance, including medications, impaired vision or hearing, underlying metabolic abnormalities, dehydration, psychiatric diagnoses, and notify attending LIP2. [place] high risk fall precautions, as indicated3. Provide frequent short contacts to provide reality

## 2025-05-27 NOTE — CARE COORDINATION
Case Management Assessment  Initial Evaluation    Date/Time of Evaluation: 5/27/2025 1:53 PM  Assessment Completed by: Urmila Connell RN    If patient is discharged prior to next notation, then this note serves as note for discharge by case management.    Patient Name: Crystal Pandey                   YOB: 1940  Diagnosis: Closed fracture of left hip, initial encounter ([facility]) [S72.002A]  Fall, initial encounter [W19.XXXA]  Hip fracture requiring operative repair, left, closed, initial encounter ([facility]) [S72.002A]                   Date / Time: 5/25/2025 11:26 AM    Patient Admission Status: Inpatient   Readmission Risk (Low < 19, Mod (19-27), High > 27): Readmission Risk Score: 11.4    Current PCP: Alexa Ortiz MD  PCP verified by CM? Yes (Hospice MD)    Chart Reviewed: Yes      History Provided by: Patient, Medical Record, Child/Family  Patient Orientation: Alert and Oriented    Patient Cognition: Alert    Hospitalization in the last 30 days (Readmission):  No    If yes, Readmission Assessment in  Navigator will be completed.    Advance Directives:      Code Status: DNR-CC   Patient's Primary Decision Maker is: Named in Scanned ACP Document    Primary Decision Maker: Lashanda Noble (1st HCPOA) - Child - [phone]    Secondary Decision Maker: Martha Bonds (2nd HCPOA) - Child - [phone]    Discharge Planning:    Patient lives with: Other (Comment) (Melissa MAYKOR- [facility]) Type of Home: Long-Term Care  Primary Care Giver: Other (Comment) (Carlos MAYKOR)  Patient Support Systems include: Children, Family Members   Current Financial resources: Medicare  Current community resources: None  Current services prior to admission: Extended Care Facility            Current DME:              Type of Home Care services:  None    ADLS  Prior functional level: Assistance with the following:, Bathing, Cooking, Dressing, Housework, Shopping  Current functional level: Assistance with the following:, Bathing,

## 2025-05-27 NOTE — CARE COORDINATION
CTN contacted [facility] inpatient  on C5 and left voicemail on [phone] regarding patient eligibility for RPM.     Isabel Dubon RN   [phone]

## 2025-05-27 NOTE — PLAN OF CARE
Bed alarm activated for safety. Avasys in use. Primary nurse in room often.   Pain attempted to be managed by fentanyl and morphine. Patient being positioned with pillows to assist with skin integrity. This patient does not stay in one position long.     Problem: Safety - Adult  Goal: Free from fall injury  5/27/2025 0316 by Beth Hancock, RN  Outcome: Progressing  5/26/2025 1821 by Mai Jain, RN  Outcome: Progressing     Problem: Pain  Goal: Verbalizes/displays adequate comfort level or baseline comfort level  Outcome: Progressing     Problem: Skin/Tissue Integrity  Goal: Skin integrity remains intact  Description: 1.  Monitor for areas of redness and/or skin breakdown2.  Assess vascular access sites hourly3.  Every 4-6 hours minimum:  Change oxygen saturation probe site4.  Every 4-6 hours:  If on nasal continuous positive airway pressure, respiratory therapy assess nares and determine need for appliance change or resting period  Outcome: Progressing  Flowsheets (Taken 5/27/2025 0315)  Skin Integrity Remains Intact:   Monitor for areas of redness and/or skin breakdown   Turn and reposition as indicated   Positioning devices   Monitor skin under medical devices

## 2025-05-27 NOTE — CARE COORDINATION
Writer received call from ELISEO Thomas and the plan is to return to [facility] with arranged care.     Urmila Connell RN

## 2025-05-27 NOTE — DISCHARGE INSTR - COC
Continuity of Care Form    Patient Name: Crystal Pandey   :  1940  MRN:  [id number]    Admit date:  2025  Discharge date:  25    Code Status Order: DNR-CC   Advance Directives:     Admitting Physician:  Ever Bergeron DO  PCP: Alexa Ortiz MD    Discharging Nurse: [facility] Unit/Room[facility] 0550/0550-01  Discharging Unit Phone Number: [phone]    Emergency Contact:   Extended Emergency Contact Information  Primary Emergency Contact: NobleLashanda (1st HCPOA)   [facility]  Home Phone: [phone]  Mobile Phone: [phone]  Relation: Child  Secondary Emergency Contact: Martha Bonds (2nd HCPOA)  Home Phone: [phone]  Relation: Child    Past Surgical History:  Past Surgical History:   Procedure Laterality Date    ABLATION OF DYSRHYTHMIC FOCUS  16    L-sided atypical A flutter, L-sided AT    CATARACT REMOVAL WITH IMPLANT Left 2018    COLONOSCOPY      EYE SURGERY Right 2018    phaco with IOL    KNEE SURGERY Left     meniscus tear       Immunization History:   Immunization History   Administered Date(s) Administered    COVID-19, MODERNA BLUE border, Primary or Immunocompromised, (age 12y+), IM, 100 mcg/0.5mL 2021, 2021    COVID-19, MODERNA Bivalent, (age 12y+), IM, 50 mcg/0.5 mL 10/19/2022    COVID-19, MODERNA, , (age 12y+), IM, 50mcg/0.5mL 2023    Influenza Vaccine, unspecified formulation 2017    Influenza, FLUAD, (age 65 y+), IM, Quadv, 0.5mL 2022, 2023    Influenza, FLUAD, (age 65 y+), IM, Trivalent PF, 0.5mL 2019    Influenza, FLUZONE High Dose, (age 65 y+), IM, Trivalent PF, 0.5mL 2017, 2018, 2019    Pneumococcal, PCV-13, PREVNAR 13, (age 6w+), IM, 0.5mL 2016    Pneumococcal, PPSV23, PNEUMOVAX 23, (age 2y+), SC/IM, 0.5mL 2011, 2017    TDaP, ADACEL (age 10y-64y), BOOSTRIX (age 10y+), IM, 0.5mL 2014    Zoster Recombinant (Shingrix) 2009,

## 2025-05-27 NOTE — DISCHARGE INSTRUCTIONS
Keep dressing clean and dry.  Change Mepilex every 3-5 days or as needed for excess drainage.  Please call physician if increased redness around incision, increased drainage, fevers, or pain becomes significantly worse.  Do not immerse in water such as baths but okay to shower with dressing on to protect wound.    Follow up with Dr Quinteros in 10-14 days.  Call [facility] for appointment time and date for follow up at [phone].        Weight Bearing on Surgical Side: full    Continue DVT Prophylaxis: Lovenox 40mg subcut daily for 42 days    Thigh high compression stockings to be worn on both legs for 30 days post-op. Put the compression stockings on in the morning, take off before you go to bed    Pain Medications  You were given oxycodone (Oxycontin, Oxyir)  Wean off pain medications as you deem appropriate as long as pain is under control  Be sure to drink plenty of fluids (recommend water) while taking narcotic pain medications to prevent constipation   You may take an over the counter laxative or stool softener as needed to prevent/treat constipation as well, we recommend miralax/glycolax.  We recommend that you consider taking these medications the entire time you are taking pain medication.  Cold packs/Ice packs/Machine  May be used as much as necessary to control swelling/inflammation/soreness  Be sure to have a barrier (cloth, clothing, towel) between the site and the ice pack to prevent frostbite  Contact [facility] office [phone] if  Increased redness, swelling, drainage of any kind, and/or pain to surgery site.  As well as new onset fevers and or chills.  These could signify an infection.  Calf or thigh tenderness to touch as well as increased swelling or redness.  This could signify a clot formation.  Numbness or tingling to an area around the incision site or below the incision site (toes).  Any rash appears, increased  or new onset nausea/vomiting occur.  This may

## 2025-05-28 PROCEDURE — 6370000000 HC RX 637 (ALT 250 FOR IP): Performed by: SPECIALIST/TECHNOLOGIST

## 2025-05-28 PROCEDURE — 6360000002 HC RX W HCPCS: Performed by: SPECIALIST/TECHNOLOGIST

## 2025-05-28 PROCEDURE — 6370000000 HC RX 637 (ALT 250 FOR IP): Performed by: ORTHOPAEDIC SURGERY

## 2025-05-28 PROCEDURE — 6360000002 HC RX W HCPCS: Performed by: NURSE PRACTITIONER

## 2025-05-28 PROCEDURE — 1200000000 HC SEMI PRIVATE

## 2025-05-28 RX ADMIN — OXYCODONE HYDROCHLORIDE 5 MG: 5 SOLUTION ORAL at 09:18

## 2025-05-28 RX ADMIN — ENOXAPARIN SODIUM 40 MG: 100 INJECTION SUBCUTANEOUS at 09:18

## 2025-05-28 RX ADMIN — OXYCODONE HYDROCHLORIDE 10 MG: 5 SOLUTION ORAL at 04:24

## 2025-05-28 RX ADMIN — LOSARTAN POTASSIUM 50 MG: 25 TABLET, FILM COATED ORAL at 09:19

## 2025-05-28 RX ADMIN — DILTIAZEM HYDROCHLORIDE 180 MG: 180 CAPSULE, EXTENDED RELEASE ORAL at 09:18

## 2025-05-28 RX ADMIN — METOPROLOL SUCCINATE 50 MG: 50 TABLET, FILM COATED, EXTENDED RELEASE ORAL at 09:18

## 2025-05-28 RX ADMIN — OXYCODONE HYDROCHLORIDE 5 MG: 5 SOLUTION ORAL at 18:08

## 2025-05-28 RX ADMIN — FUROSEMIDE 20 MG: 10 INJECTION, SOLUTION INTRAMUSCULAR; INTRAVENOUS at 09:19

## 2025-05-28 RX ADMIN — DULOXETINE HYDROCHLORIDE 30 MG: 30 CAPSULE, DELAYED RELEASE ORAL at 09:18

## 2025-05-28 RX ADMIN — ACETAMINOPHEN 650 MG: 325 TABLET ORAL at 09:18

## 2025-05-28 RX ADMIN — ACETAMINOPHEN 650 MG: 325 TABLET ORAL at 18:08

## 2025-05-28 ASSESSMENT — PAIN - FUNCTIONAL ASSESSMENT: PAIN_FUNCTIONAL_ASSESSMENT: PREVENTS OR INTERFERES SOME ACTIVE ACTIVITIES AND ADLS

## 2025-05-28 ASSESSMENT — PAIN SCALES - WONG BAKER: WONGBAKER_NUMERICALRESPONSE: HURTS WHOLE LOT

## 2025-05-28 ASSESSMENT — PAIN DESCRIPTION - LOCATION
LOCATION: HIP
LOCATION: HIP;LEG

## 2025-05-28 ASSESSMENT — PAIN DESCRIPTION - ORIENTATION
ORIENTATION: LEFT
ORIENTATION: LEFT

## 2025-05-28 ASSESSMENT — PAIN DESCRIPTION - DESCRIPTORS: DESCRIPTORS: ACHING

## 2025-05-28 ASSESSMENT — PAIN SCALES - GENERAL
PAINLEVEL_OUTOF10: 0
PAINLEVEL_OUTOF10: 7

## 2025-05-28 NOTE — PLAN OF CARE
Problem: Safety - Adult  Goal: Free from fall injury  Outcome: Progressing     Problem: Discharge Planning  Goal: Discharge to home or other facility with appropriate resources  Outcome: Progressing     Problem: Pain  Goal: Verbalizes/displays adequate comfort level or baseline comfort level  Outcome: Progressing     Problem: Skin/Tissue Integrity  Goal: Skin integrity remains intact  Description: 1.  Monitor for areas of redness and/or skin breakdown2.  Assess vascular access sites hourly3.  Every 4-6 hours minimum:  Change oxygen saturation probe site4.  Every 4-6 hours:  If on nasal continuous positive airway pressure, respiratory therapy assess nares and determine need for appliance change or resting period  Outcome: Progressing     Problem: Confusion  Goal: Confusion, delirium, dementia, or psychosis is improved or at baseline  Description: INTERVENTIONS:1. Assess for possible contributors to thought disturbance, including medications, impaired vision or hearing, underlying metabolic abnormalities, dehydration, psychiatric diagnoses, and notify attending LIP2. [place] high risk fall precautions, as indicated3. Provide frequent short contacts to provide reality reorientation, refocusing and direction4. Decrease environmental stimuli, including noise as appropriate5. Monitor and intervene to maintain adequate nutrition, hydration, elimination, sleep and activity6. If unable to ensure safety without constant attention obtain sitter and review sitter guidelines with assigned personnel7. Initiate Psychosocial CNS and Spiritual Care consult, as indicated  INTERVENTIONS:1. Assess for possible contributors to thought disturbance, including medications, impaired vision or hearing, underlying metabolic abnormalities, dehydration, psychiatric diagnoses, and notify attending LIP2. [place] high risk fall precautions, as indicated3. Provide frequent short contacts to provide reality reorientation, refocusing and

## 2025-05-28 NOTE — PLAN OF CARE
Bed alarm activated for safety. Close to the nurses station. Checked on often.     Problem: Safety - Adult  Goal: Free from fall injury  5/27/2025 2335 by Beth Hancock, RN  Outcome: Progressing  5/27/2025 1000 by Suzy Mirza, RN  Outcome: Progressing

## 2025-05-28 NOTE — CARE COORDINATION
received call from Maricruz at [facility] requesting notes be faxed, writer faxed to [phone]. After this day Maricruz will be out and the director is Pallavi [phone].    Plan is for patient to return to [facility] when medically ready and Hospice has all equipment delivered.     Urmila Connell RN

## 2025-05-29 LAB
ANION GAP SERPL CALCULATED.3IONS-SCNC: 10 MMOL/L (ref 3–16)
BUN SERPL-MCNC: 32 MG/DL (ref 7–20)
CALCIUM SERPL-MCNC: 8.9 MG/DL (ref 8.3–10.6)
CHLORIDE SERPL-SCNC: 107 MMOL/L (ref 99–110)
CO2 SERPL-SCNC: 29 MMOL/L (ref 21–32)
CREAT SERPL-MCNC: 1 MG/DL (ref 0.6–1.2)
DEPRECATED RDW RBC AUTO: 14.2 % (ref 12.4–15.4)
GFR SERPLBLD CREATININE-BSD FMLA CKD-EPI: 55 ML/MIN/{1.73_M2}
GLUCOSE SERPL-MCNC: 120 MG/DL (ref 70–99)
HCT VFR BLD AUTO: 35.3 % (ref 36–48)
HGB BLD-MCNC: 11.9 G/DL (ref 12–16)
MCH RBC QN AUTO: 30.6 PG (ref 26–34)
MCHC RBC AUTO-ENTMCNC: 33.6 G/DL (ref 31–36)
MCV RBC AUTO: 91.1 FL (ref 80–100)
PLATELET # BLD AUTO: 317 K/UL (ref 135–450)
PMV BLD AUTO: 8.3 FL (ref 5–10.5)
POTASSIUM SERPL-SCNC: 4.2 MMOL/L (ref 3.5–5.1)
RBC # BLD AUTO: 3.87 M/UL (ref 4–5.2)
SODIUM SERPL-SCNC: 146 MMOL/L (ref 136–145)
WBC # BLD AUTO: 8.7 K/UL (ref 4–11)

## 2025-05-29 PROCEDURE — 2700000000 HC OXYGEN THERAPY PER DAY

## 2025-05-29 PROCEDURE — 6370000000 HC RX 637 (ALT 250 FOR IP): Performed by: ORTHOPAEDIC SURGERY

## 2025-05-29 PROCEDURE — 6360000002 HC RX W HCPCS: Performed by: SPECIALIST/TECHNOLOGIST

## 2025-05-29 PROCEDURE — 97535 SELF CARE MNGMENT TRAINING: CPT

## 2025-05-29 PROCEDURE — 36415 COLL VENOUS BLD VENIPUNCTURE: CPT

## 2025-05-29 PROCEDURE — 97110 THERAPEUTIC EXERCISES: CPT

## 2025-05-29 PROCEDURE — 1200000000 HC SEMI PRIVATE

## 2025-05-29 PROCEDURE — 6370000000 HC RX 637 (ALT 250 FOR IP): Performed by: SPECIALIST/TECHNOLOGIST

## 2025-05-29 PROCEDURE — 85027 COMPLETE CBC AUTOMATED: CPT

## 2025-05-29 PROCEDURE — 80048 BASIC METABOLIC PNL TOTAL CA: CPT

## 2025-05-29 PROCEDURE — 97530 THERAPEUTIC ACTIVITIES: CPT

## 2025-05-29 PROCEDURE — 6360000002 HC RX W HCPCS: Performed by: NURSE PRACTITIONER

## 2025-05-29 PROCEDURE — 94761 N-INVAS EAR/PLS OXIMETRY MLT: CPT

## 2025-05-29 RX ADMIN — FUROSEMIDE 20 MG: 10 INJECTION, SOLUTION INTRAMUSCULAR; INTRAVENOUS at 08:37

## 2025-05-29 RX ADMIN — DULOXETINE HYDROCHLORIDE 30 MG: 30 CAPSULE, DELAYED RELEASE ORAL at 08:42

## 2025-05-29 RX ADMIN — OXYCODONE HYDROCHLORIDE 10 MG: 5 SOLUTION ORAL at 19:43

## 2025-05-29 RX ADMIN — LOSARTAN POTASSIUM 50 MG: 25 TABLET, FILM COATED ORAL at 08:24

## 2025-05-29 RX ADMIN — METOPROLOL SUCCINATE 50 MG: 50 TABLET, FILM COATED, EXTENDED RELEASE ORAL at 08:28

## 2025-05-29 RX ADMIN — OXYCODONE HYDROCHLORIDE 10 MG: 5 SOLUTION ORAL at 08:39

## 2025-05-29 RX ADMIN — DILTIAZEM HYDROCHLORIDE 180 MG: 180 CAPSULE, EXTENDED RELEASE ORAL at 08:43

## 2025-05-29 RX ADMIN — BISACODYL 10 MG: 10 SUPPOSITORY RECTAL at 09:56

## 2025-05-29 RX ADMIN — ENOXAPARIN SODIUM 40 MG: 100 INJECTION SUBCUTANEOUS at 08:34

## 2025-05-29 ASSESSMENT — PAIN SCALES - PAIN ASSESSMENT IN ADVANCED DEMENTIA (PAINAD)
BODYLANGUAGE: RELAXED
FACIALEXPRESSION: SMILING OR INEXPRESSIVE
CONSOLABILITY: DISTRACTED OR REASSURED BY VOICE/TOUCH
BREATHING: NORMAL
BREATHING: OCCASIONAL LABORED BREATHING, SHORT PERIOD OF HYPERVENTILATION
TOTALSCORE: 1
CONSOLABILITY: DISTRACTED OR REASSURED BY VOICE/TOUCH
FACIALEXPRESSION: FACIAL GRIMACING
TOTALSCORE: 7
NEGVOCALIZATION: REPEATED TROUBLED CALLING OUT, LOUD MOANING/GROANING, CRYING
BODYLANGUAGE: TENSE, DISTRESSED PACING, FIDGETING

## 2025-05-29 ASSESSMENT — PAIN SCALES - WONG BAKER: WONGBAKER_NUMERICALRESPONSE: NO HURT

## 2025-05-29 ASSESSMENT — PAIN SCALES - GENERAL: PAINLEVEL_OUTOF10: 0

## 2025-05-29 NOTE — CARE COORDINATION
Writer reviewed chart and spoke with RN, patient needs to have a BM, bowel regimen started, maybe SLP consult due to choking so she can have pleasure feeds. Plan is to return to [facility] with [facility].     Urmila Connell RN

## 2025-05-29 NOTE — PLAN OF CARE
Problem: Safety - Adult  Goal: Free from fall injury  5/29/2025 0024 by Haley Wise RN  Outcome: Progressing  5/28/2025 1338 by Luis E Bernstein RN  Outcome: Progressing     Problem: Discharge Planning  Goal: Discharge to home or other facility with appropriate resources  5/29/2025 0024 by Haley Wise RN  Outcome: Progressing  5/28/2025 1338 by Luis E Bernstein RN  Outcome: Progressing     Problem: Pain  Goal: Verbalizes/displays adequate comfort level or baseline comfort level  5/29/2025 0024 by Haley Wise RN  Outcome: Progressing  5/28/2025 1338 by Luis E Bernstein RN  Outcome: Progressing     Problem: Skin/Tissue Integrity  Goal: Skin integrity remains intact  Description: 1.  Monitor for areas of redness and/or skin breakdown2.  Assess vascular access sites hourly3.  Every 4-6 hours minimum:  Change oxygen saturation probe site4.  Every 4-6 hours:  If on nasal continuous positive airway pressure, respiratory therapy assess nares and determine need for appliance change or resting period  5/29/2025 0024 by Haley Wise RN  Outcome: Progressing  5/28/2025 1338 by Luis E Bernstein RN  Outcome: Progressing     Problem: Confusion  Goal: Confusion, delirium, dementia, or psychosis is improved or at baseline  Description: INTERVENTIONS:1. Assess for possible contributors to thought disturbance, including medications, impaired vision or hearing, underlying metabolic abnormalities, dehydration, psychiatric diagnoses, and notify attending LIP2. [place] high risk fall precautions, as indicated3. Provide frequent short contacts to provide reality reorientation, refocusing and direction4. Decrease environmental stimuli, including noise as appropriate5. Monitor and intervene to maintain adequate nutrition, hydration, elimination, sleep and activity6. If unable to ensure safety without constant attention obtain sitter and review sitter guidelines with assigned personnel7. Initiate Psychosocial

## 2025-05-29 NOTE — PLAN OF CARE
Problem: Skin/Tissue Integrity  Goal: Skin integrity remains intact  Description: 1.  Monitor for areas of redness and/or skin breakdown2.  Assess vascular access sites hourly3.  Every 4-6 hours minimum:  Change oxygen saturation probe site4.  Every 4-6 hours:  If on nasal continuous positive airway pressure, respiratory therapy assess nares and determine need for appliance change or resting period  Outcome: Not Progressing     Pt instructed to turn/change positions every two hours. Staff assisting and utilizing pillows under buttocks and b/l heels. Zinc applied to buttocks. Redness (blanchable) noted to right hip. Coccyx/buttocks red/purplish but blanchable.

## 2025-05-30 VITALS
HEIGHT: 64 IN | SYSTOLIC BLOOD PRESSURE: 147 MMHG | WEIGHT: 160 LBS | RESPIRATION RATE: 18 BRPM | HEART RATE: 64 BPM | OXYGEN SATURATION: 94 % | TEMPERATURE: 98.1 F | BODY MASS INDEX: 27.31 KG/M2 | DIASTOLIC BLOOD PRESSURE: 70 MMHG

## 2025-05-30 PROCEDURE — 6370000000 HC RX 637 (ALT 250 FOR IP): Performed by: SPECIALIST/TECHNOLOGIST

## 2025-05-30 PROCEDURE — 97530 THERAPEUTIC ACTIVITIES: CPT

## 2025-05-30 PROCEDURE — 97535 SELF CARE MNGMENT TRAINING: CPT

## 2025-05-30 PROCEDURE — 6360000002 HC RX W HCPCS: Performed by: SPECIALIST/TECHNOLOGIST

## 2025-05-30 PROCEDURE — 97110 THERAPEUTIC EXERCISES: CPT

## 2025-05-30 PROCEDURE — 6370000000 HC RX 637 (ALT 250 FOR IP): Performed by: ORTHOPAEDIC SURGERY

## 2025-05-30 RX ORDER — ENOXAPARIN SODIUM 100 MG/ML
40 INJECTION SUBCUTANEOUS DAILY
Qty: 12 ML | Refills: 1 | Status: SHIPPED | OUTPATIENT
Start: 2025-05-31 | End: 2025-07-12

## 2025-05-30 RX ADMIN — OXYCODONE HYDROCHLORIDE 10 MG: 5 SOLUTION ORAL at 14:41

## 2025-05-30 RX ADMIN — ENOXAPARIN SODIUM 40 MG: 100 INJECTION SUBCUTANEOUS at 09:22

## 2025-05-30 RX ADMIN — METOPROLOL SUCCINATE 50 MG: 50 TABLET, FILM COATED, EXTENDED RELEASE ORAL at 09:31

## 2025-05-30 RX ADMIN — OXYCODONE HYDROCHLORIDE 10 MG: 5 SOLUTION ORAL at 09:26

## 2025-05-30 RX ADMIN — LOSARTAN POTASSIUM 50 MG: 25 TABLET, FILM COATED ORAL at 09:28

## 2025-05-30 ASSESSMENT — PAIN SCALES - PAIN ASSESSMENT IN ADVANCED DEMENTIA (PAINAD)
BREATHING: OCCASIONAL LABORED BREATHING, SHORT PERIOD OF HYPERVENTILATION
BREATHING: NOISY LABORED BREATHING, LONG PERIODS HYPERVENTILATION, CHEYNE-STOKES RESPIRATIONS
FACIALEXPRESSION: FACIAL GRIMACING
TOTALSCORE: 9
NEGVOCALIZATION: REPEATED TROUBLED CALLING OUT, LOUD MOANING/GROANING, CRYING
CONSOLABILITY: UNABLE TO CONSOLE, DISTRACT OR REASSURE
BODYLANGUAGE: RIGID, FISTS CLENCHED, KNEES UP, PUSHING/PULLING AWAY, STRIKES OUT
TOTALSCORE: 10
CONSOLABILITY: UNABLE TO CONSOLE, DISTRACT OR REASSURE
FACIALEXPRESSION: FACIAL GRIMACING
NEGVOCALIZATION: REPEATED TROUBLED CALLING OUT, LOUD MOANING/GROANING, CRYING
BODYLANGUAGE: RIGID, FISTS CLENCHED, KNEES UP, PUSHING/PULLING AWAY, STRIKES OUT

## 2025-05-30 NOTE — PLAN OF CARE
Problem: Safety - Adult  Goal: Free from fall injury  Outcome: Progressing     Problem: Discharge Planning  Goal: Discharge to home or other facility with appropriate resources  Outcome: Progressing     Problem: Pain  Goal: Verbalizes/displays adequate comfort level or baseline comfort level  Outcome: Progressing     Problem: Skin/Tissue Integrity  Goal: Skin integrity remains intact  Description: 1.  Monitor for areas of redness and/or skin breakdown2.  Assess vascular access sites hourly3.  Every 4-6 hours minimum:  Change oxygen saturation probe site4.  Every 4-6 hours:  If on nasal continuous positive airway pressure, respiratory therapy assess nares and determine need for appliance change or resting period  5/29/2025 2126 by Haley Wise, RN  Outcome: Progressing  5/29/2025 1426 by Mai Jain, RN  Outcome: Not Progressing     Problem: Confusion  Goal: Confusion, delirium, dementia, or psychosis is improved or at baseline  Description: INTERVENTIONS:1. Assess for possible contributors to thought disturbance, including medications, impaired vision or hearing, underlying metabolic abnormalities, dehydration, psychiatric diagnoses, and notify attending LIP2. [place] high risk fall precautions, as indicated3. Provide frequent short contacts to provide reality reorientation, refocusing and direction4. Decrease environmental stimuli, including noise as appropriate5. Monitor and intervene to maintain adequate nutrition, hydration, elimination, sleep and activity6. If unable to ensure safety without constant attention obtain sitter and review sitter guidelines with assigned personnel7. Initiate Psychosocial CNS and Spiritual Care consult, as indicated  INTERVENTIONS:1. Assess for possible contributors to thought disturbance, including medications, impaired vision or hearing, underlying metabolic abnormalities, dehydration, psychiatric diagnoses, and notify attending LIP2. [place] high risk fall precautions,

## 2025-05-30 NOTE — CARE COORDINATION
CASE MANAGEMENT DISCHARGE SUMMARY      Discharge to: [facility] with [facility]    Precertification completed: N/A  Hospital Exemption Notification (HENS) completed: N/A    IMM given: (date) 5/30/25  Follow-Up copy of Important Message from Medicare (IMM2) has been explained to patient and/or designated healthcare decision maker (HCDM). Pt and/or HCDM aware that patient is permitted to stay an additional 4 hours prior to discharge to consider an appeal if they feel as though they are being discharged too soon. Patient may discharge as planned if chooses to do so.  Patient/HCDM voice no other concerns or questions regarding this process.      New Durable Medical Equipment ordered/agency: [facility]    Transportation: [facility]     Medical Transport explained to pt/family. Pt/family voice no agency preference.    Agency used: [facility]   time: 1400   Ambulance form completed: Yes    Confirmed discharge plan with:     Patient: yes     Family:  yes - daughter at bedside     Facility/Agency, name: [facility]    Phone number for report to facility:  [phone]     RN, name: Mai    Note: Discharging nurse to complete WILLIE, reconcile AVS, and place final copy with patient's discharge packet. RN to ensure that written prescriptions for  Level II medications are sent with patient to the facility as per protocol.      Urmila Connell RN

## 2025-05-30 NOTE — DISCHARGE SUMMARY
Hospital Medicine Discharge Summary    Patient: Crystal Pandey   : 1940     Hospital:  [facility]  Admit Date: 2025   Discharge Date:   25   Disposition:  [facility] with [facility]    Code status:  DNR/CC  Condition at Discharge: Stable  Primary Care Provider: Alexa Ortiz MD    Admitting Provider: Ever Bergeron DO  Discharge Provider: Gwendolyn Gardner, APRN - CNP     Discharge Diagnoses:      Active Hospital Problems    Diagnosis     Hip fracture requiring operative repair, left, closed, initial encounter ([facility]) [S72.002A]      Chief Admission Complaint:    Fall (From [facility] memory unit. Unwitnessed fall, someone heard a thump and staff found pt on the floor. C/o L hip pain. 50mcg fentanyl given IV en route. DNR-CC per paperwork)         Subjective:  EMR and notes reviewed pt seen and examined.       Presenting Admission History:        85 y.o. female who presented to [facility] with fall and hip pain .\" Patient with history of dementia.  In nursing facility.  Squad called for unwitnessed fall.  Staff reports they did hear her fall and downtime minimal.  Unsure of head injury.  Patient does appear to be in discomfort.  She is poor historian and unable to contribute to history outside of complaints of hip pain.\"   PMHx significant for Alzheimer dementia, resides in memory care AFib/Flutter s/p ablation on eliquis . MV regurg, HTN     Ed course: pt presented with stable VS , was satting 90% and was placed on 2 liter per nasal cannula review and she is now on 6 liters. BMP WNL, CBC, wnl trop is elevated at 26. CXR heavily rotated with bilateral pleural effusion and air space disease suspicious for pulmonary edema, at the time of my review no ecg had been obtained so ordered, as well as a BNP. Head and neck ct comp, again Bree pleural effusions noted. Discussed with ED PA, will give lasix dose. Treated for pain with IV meds,

## 2025-05-30 NOTE — PROGRESS NOTES
PLAN of Care Note    Patient:  Crystal Pandey  YOB: 1940     85 y.o. female      Plan:   -Orthopedics aware of consult, formal consult note to follow  -had 2 thorough discussions with the pt's daughter/POA, Lashanda about risks/benefits/alternatives to surgery including hospice/comfort care (particularly as she is currently in hospice for dementia), and she would like to proceed with surgery, which I think would be beneficial as a palliative care measure given her current discomfort  -Imaging results reviewed  -NPO p MN  -Hold Anticoagulation if ok with Primary/Medicine  -Medical clearance/optimization requested  -Plan for OR tomorrow at 8AM    
         Postop Note    Patient:  Crystal Pandey  YOB: 1940     85 y.o. female      Subjective:  Patient seen and examined in the hospital. Pain appears well-controlled.  Daughter at bedside, who reports patient seems more comfortable than before surgery.       Objective:   Vitals:    05/28/25 0739   BP: 132/67   Pulse: 65   Resp: 16   Temp: 98.1 °F (36.7 °C)   SpO2: 93%     Gen: NAD, awake   MSK:  -Dressing in place, clean/dry/intact  -Toes warm/well-perfused      Impression/plan:   85 y.o. female 2 Days Post-Op, doing well overall   - 5/28/2025: I discussed with the patient's daughter the postoperative x-rays, which I reviewed, and subsequently ordered a repeat set of x-rays, due to limited views, likely secondary to poor patient positioning.  We discussed the risks of early hardware failure, but this seems extremely unlikely, as the first set of x-rays were taken in PACU, after the patient had only been transferred from the operating table to the bed.  The patient was noted to have suboptimal bone quality, but immediate catastrophic hardware failure seems unlikely.      -Ok to begin daily dry dressing changes on POD2 PRN saturation  -WB'ing status: WBAT  -Pain control  -20 tabs Hydrocodone  -DVT ppx  -Early mobilization  -Lovenox 40 mg subQ q Day x 6 weeks  -okay to begin postop day 1 from an orthopedic perspective  -Dispo  -ok to discharge from an Ortho perspective when pain controlled  -f/u in office in 2 weeks   
         Postop PLAN Note    Patient:  Crystal Pandey  YOB: 1940     85 y.o. female      Plan:   -Ok to begin daily dry dressing changes on POD2 PRN saturation  -WB'ing status: WBAT  -Pain control  -20 tabs Hydrocodone  -DVT ppx  -Early mobilization  -Lovenox 40 mg subQ q Day x 6 weeks  -okay to begin postop day 1 from an orthopedic perspective  -Dispo  -ok to discharge from an Ortho perspective when pain controlled  -f/u in office in 2 weeks   
      [facility] Medicine Progress Note  V 5.17      Date of Admission: 5/25/2025    Hospital Day: 4      Chief Admission Complaint:    Fall (From [facility] memory unit. Unwitnessed fall, someone heard a thump and staff found pt on the floor. C/o L hip pain. 50mcg fentanyl given IV en route. DNR-CC per paperwork)       Subjective:  EMR and notes reviewed pt seen and examined. today in mentation       Presenting Admission History:       85 y.o. female who presented to [facility] with fall and hip pain .\" Patient with history of dementia.  In nursing facility.  Squad called for unwitnessed fall.  Staff reports they did hear her fall and downtime minimal.  Unsure of head injury.  Patient does appear to be in discomfort.  She is poor historian and unable to contribute to history outside of complaints of hip pain.\"   PMHx significant for Alzheimer dementia, resides in memory care AFib/Flutter s/p ablation on eliquis . MV regurg, HTN     Ed course: pt presented with stable VS , was satting 90% and was placed on 2 liter per nasal cannula review and she is now on 6 liters. BMP WNL, CBC, wnl trop is elevated at 26. CXR heavily rotated with bilateral pleural effusion and air space disease suspicious for pulmonary edema, at the time of my review no ecg had been obtained so ordered, as well as a BNP. Head and neck ct comp, again Bree pleural effusions noted. Discussed with ED PA, will give lasix dose. Treated for pain with IV meds, improved comfort. Ortho was consulted in the Ed dicussed with family and plans for OR tomorrow for palliative surgical repair. PT is aDNR and family is aware and in agreement with plan as palliative care would be exteremly difficult w/o surgical repair           Assessment/Plan:    Fall: uncertain etiology suspect mechanical but pt is a poor historian due to Alzheimers dementia and fall was unwitnessed.   - Head and neck cleared with CT in the Ed as pt is on NOAC for AFib, flutter 
  Department of Orthopedic Surgery  Physician Assistant Student   Progress Note    Subjective:     Patient reported not being in pain. Daughters at bedside and stated that she appears very comfortable and calm at this time. Family does not expect her to return to ambulating due to numerous recent falls. Family's goal is for patient to remain comfortable.   Systemic or Specific Complaints:No Complaints    Objective:     Patient Vitals for the past 24 hrs:   BP Temp Temp src Pulse Resp SpO2   05/27/25 1100 116/62 -- -- 94 18 --   05/27/25 0745 132/84 98.1 °F (36.7 °C) Oral (!) 103 18 --   05/27/25 0447 112/85 -- -- 93 18 98 %   05/26/25 2317 (!) 131/57 (!) 100.6 °F (38.1 °C) Axillary (!) 112 18 94 %   05/26/25 2315 -- -- -- -- -- 95 %   05/26/25 2307 -- -- -- -- 18 --   05/26/25 2230 -- 98 °F (36.7 °C) Axillary 95 18 90 %   05/26/25 2000 -- 98.1 °F (36.7 °C) Axillary 98 18 97 %   05/26/25 1515 120/71 -- -- 92 -- --   05/26/25 1254 -- -- -- -- -- 91 %   05/26/25 1250 114/61 98.4 °F (36.9 °C) Axillary 78 -- 94 %   05/26/25 1157 -- -- -- -- -- 94 %   05/26/25 1154 116/69 97.9 °F (36.6 °C) Axillary 77 18 95 %       General: alert, appears stated age, and cooperative   Wound: Wound clean and dry no evidence of infection., No Erythema, No Drainage, and Positive for Edema   Motion: Painful range of Motion in affected extremity   DVT Exam: No evidence of DVT seen on physical exam.     Additional exam: Patient seen lying in bed at time of interview  Leg lengths and rotational alignment difficult to assess due to patient positioning. Patient is laying on her right side.   Thigh swelling as expected, compartments compressible  EHL, FHL, gastroc, and anterior tibialis motor intact  Sensation intact to light touch  DP and PT pulses 2+      Data Review  CBC:   Lab Results   Component Value Date/Time    WBC 14.1 05/27/2025 07:43 AM    RBC 3.90 05/27/2025 07:43 AM    HGB 11.6 05/27/2025 07:43 AM    HCT 35.7 05/27/2025 07:43 AM    PLT 
  Department of Orthopedic Surgery  Physician Assistant Student   Progress Note    Subjective:     Patient was calm and sleepy at time of visit. She reported no pain. She was unable to answer any further questions due to cognitive status. No family at bedside.   Systemic or Specific Complaints:No Complaints    Objective:     Patient Vitals for the past 24 hrs:   BP Temp Temp src Pulse Resp SpO2   05/28/25 0739 132/67 98.1 °F (36.7 °C) Oral 65 16 93 %   05/28/25 0415 117/62 -- -- 70 18 96 %   05/28/25 0030 120/68 98.5 °F (36.9 °C) Oral 65 18 98 %   05/27/25 2300 -- -- -- -- -- 96 %   05/27/25 2200 (!) 116/57 97 °F (36.1 °C) Axillary 68 18 92 %   05/27/25 1900 -- 98.6 °F (37 °C) Oral -- -- --   05/27/25 1320 -- 99.3 °F (37.4 °C) Oral -- -- --   05/27/25 1100 116/62 -- -- 94 18 --       General: alert, appears stated age, cooperative, and no distress   Wound: Wound clean and dry no evidence of infection., No Erythema, No Drainage, Positive for Edema, and No Ecchymosis    Motion: Painful range of Motion in affected extremity   DVT Exam: No evidence of DVT seen on physical exam.     Additional exam: Patient seen laying in bed at time of interview  Leg lengths and rotational alignment unable to assess due to patient positioning. She is laying primarily on her right side with her knees bent.   Thigh moderate swelling as expected, compartments compressible  EHL, FHL, gastroc, and anterior tibialis motor intact  Sensation intact to light touch  DP and PT pulses 2+      Data Review  CBC:   Lab Results   Component Value Date/Time    WBC 14.1 05/27/2025 07:43 AM    RBC 3.90 05/27/2025 07:43 AM    HGB 11.6 05/27/2025 07:43 AM    HCT 35.7 05/27/2025 07:43 AM     05/27/2025 07:43 AM       Renal:   Lab Results   Component Value Date/Time     05/27/2025 07:43 AM    K 4.4 05/27/2025 07:43 AM     05/27/2025 07:43 AM    CO2 27 05/27/2025 07:43 AM    BUN 27 05/27/2025 07:43 AM    CREATININE 1.2 05/27/2025 07:43 AM    
  Physician Progress Note      PATIENT:               CAMRYN WILL  CSN #:                  [id number]  :                       1940  ADMIT DATE:       2025 11:26 AM  DISCH DATE:  RESPONDING  PROVIDER #:        DEONTE SAUNDERS - ALTAF          QUERY TEXT:    Congestive Heart Failure is documented in the medical record .  Please   document the type and acuity:    The clinical indicators include:  H&P--\" Hip fracture; etiology fall, osteopenia---Pleural Effusion: uncertain   etiology does have a hx of mitral valve regurge, last ECHO was  Normal   left ventricle size with mild concentric left ventricular  hypertrophy.    estimated ejection fraction of 55%.  pt has new o2 requirement perhaps form   pain meds, she is unable to articulate any sob. Added BNP and ECG, perhaps she   has worsening heart failure and valve disease.  Cardiology has been   consulted, for pre op evaluation,  discussed with ED PPA, he will give IV   lasix.\"    Cxray----\"Bilateral effusions larger on the right with bilateral airspace   disease suspicious for pulmonary pulmonary edema\" , BNP on admission --4,046,   trop- 26.    IV lasix, cxray, Ct, supplemental oxygen, ECG, Card consult, essential home   meds, supportive care  Options provided:  -- Acute on Chronic Systolic CHF/HFrEF  -- Acute on Chronic Diastolic CHF/HFpEF  -- Acute on Chronic Systolic and Diastolic CHF  -- Acute Systolic CHF/HFrEF  -- Acute Diastolic CHF/HFpEF  -- Acute Systolic and Diastolic CHF  -- Chronic Systolic CHF/HFrEF  -- Chronic Diastolic CHF/HFpEF  -- Chronic Systolic and Diastolic CHF  -- Other - I will add my own diagnosis  -- Disagree - Not applicable / Not valid  -- Disagree - Clinically unable to determine / Unknown  -- Refer to Clinical Documentation Reviewer    PROVIDER RESPONSE TEXT:    Provider is clinically unable to determine a response to this query.    Query created by: Edgardo Arechiga on 2025 12:29 PM      Electronically 
4 Eyes Skin Assessment     NAME:  Crystal Pandey  YOB: 1940  MEDICAL RECORD NUMBER:  [id number]    The patient is being assessed for  Admission    I agree that at least one RN has performed a thorough Head to Toe Skin Assessment on the patient. ALL assessment sites listed below have been assessed.      Areas assessed by both nurses:    Head, Face, Ears, Shoulders, Back, Chest, Arms, Elbows, Hands, Sacrum. Buttock, Coccyx, Ischium, Legs. Feet and Heels, and Under Medical Devices         Does the Patient have a Wound? No noted wound(s)       Xander Prevention initiated by RN: YES  Wound Care Orders initiated by RN: No    Pressure Injury (Stage 3,4, Unstageable, DTI, NWPT, and Complex wounds) if present, place Wound referral order by RN under : No    New Ostomies, if present place, Ostomy referral order under : No     Nurse 1 eSignature: Electronically signed by Jacqueline Perez RN on 5/25/25 at 2:40 PM EDT    **SHARE this note so that the co-signing nurse can place an eSignature**    Nurse 2 eSignature: Electronically signed by Mikki Leiva RN on 5/25/25 at 4:58 PM EDT   
4 Eyes Skin Assessment     NAME:  Crystal Pandey  YOB: 1940  MEDICAL RECORD NUMBER:  [id number]    The patient is being assessed for  Other low faye    I agree that at least one RN has performed a thorough Head to Toe Skin Assessment on the patient. ALL assessment sites listed below have been assessed.      Areas assessed by both nurses:    Head, Face, Ears, Shoulders, Back, Chest, Arms, Elbows, Hands, Sacrum. Buttock, Coccyx, Ischium, and Legs. Feet and Heels        Does the Patient have a Wound? No noted wound(s)       Faye Prevention initiated by RN: Yes  Wound Care Orders initiated by RN: No    Pressure Injury (Stage 3,4, Unstageable, DTI, NWPT, and Complex wounds) if present, place Wound referral order by RN under : No    New Ostomies, if present place, Ostomy referral order under : No     Nurse 1 eSignature: Electronically signed by Haley Wise RN on 5/29/25 at 12:46 AM EDT    **SHARE this note so that the co-signing nurse can place an eSignature**    Nurse 2 eSignature: Electronically signed by Paolo Fernandez RN on 5/29/25 at 12:56 AM EDT   
4 Eyes Skin Assessment     NAME:  Crystal Pandey  YOB: 1940  MEDICAL RECORD NUMBER:  [id number]    The patient is being assessed for  Other low faye    I agree that at least one RN has performed a thorough Head to Toe Skin Assessment on the patient. ALL assessment sites listed below have been assessed.      Areas assessed by both nurses:    Head, Face, Ears, Shoulders, Back, Chest, Arms, Elbows, Hands, Sacrum. Buttock, Coccyx, Ischium, and Legs. Feet and Heels        Does the Patient have a Wound? No noted wound(s)       Faye Prevention initiated by RN: Yes  Wound Care Orders initiated by RN: No    Pressure Injury (Stage 3,4, Unstageable, DTI, NWPT, and Complex wounds) if present, place Wound referral order by RN under : No    New Ostomies, if present place, Ostomy referral order under : No     Nurse 1 eSignature: Electronically signed by Haley Wise RN on 5/29/25 at 9:47 PM EDT    **SHARE this note so that the co-signing nurse can place an eSignature**    Nurse 2 eSignature: Electronically signed by Hiwot Mccarthy RN on 5/30/25 at 5:09 AM EDT   
Called Cardiology consult STAT to get medical clearance for surgery bianca am.   
Called [facility] multiple times to obtain MAR for reconciliation, nurse to send a copy of med list when she gets done passing her medications. Inpatient consult to pharmacy added in case it comes after shift change so that it doesn't get missed. Will notify oncoming shift.   
Occupational Therapy  Facility/Department: [place] [address] - MED SURG/ORTHO  Occupational Therapy Initial Assessment and Treatment Note     Name: Crystal Pandey  : 1940  MRN: [id number]  Date of Service: 2025    Discharge Recommendations:  Subacute/Skilled Nursing Facility   Therapy discharge recommendations are subject to collaboration from the patient’s interdisciplinary healthcare team, including MD and case management recommendations.    Barriers to Home Discharge:   [] Steps to access home entry or bed/bath:   [x] Unable to transfer, ambulate, or propel wheelchair household distances without assist   [] Limited available assist at home upon discharge    [] Patient or family requests d/c to post-acute facility    [] Poor cognition/safety awareness for d/c to home alone    [] Unable to maintain ordered weight bearing status    [x] Patient with salient signs of long-standing immobility   [x] Decreased independence with ADLs   [x] Increased risk for falls   [] Other:    If pt is unable to be seen after this session, please let this note serve as discharge summary.  Please see case management note for discharge disposition.  Thank you.     Patient Diagnosis(es): The primary encounter diagnosis was Fall, initial encounter. A diagnosis of Closed fracture of left hip, initial encounter (HCC) was also pertinent to this visit.  Past Medical History:  has a past medical history of Alzheimer's dementia (HCC), Atrial flutter (HCC), Atrial tachycardia, CTS (carpal tunnel syndrome), HLD (hyperlipidemia), Hypertension, Mitral regurgitation, Mixed hyperlipidemia, Osteopenia, Paroxysmal atrial fibrillation (HCC), Pneumonia due to group B Streptococcus, unspecified laterality, unspecified part of lung, and Shingles.  Past Surgical History:  has a past surgical history that includes knee surgery (Left, ); Colonoscopy; ablation of dysrhythmic focus (16); Cataract removal with implant (Left, 2018); and eye 
Oral airway removed at this time, VSS, respiratory status stable on 5L NC, baseline.   
Outside transport given d/c paperwork and prescriptions.   
Patient admitted to room 550 from ED.  Patient oriented to room, call light, bed rails, phone, lights and bathroom.  Patient instructed about the schedule of the day including: vital sign frequency, lab draws, possible tests, frequency of MD and staff rounds, including RN/MD rounding together at bedside, daily weights, and I &O's.  Patient instructed about prescribed diet, how to order meals, and television.  Bed alarm in place, patient aware of placement and reason.  Telemetry box 98 in place, patient aware of placement and reason.  Bed locked, in lowest position, side rails up 2/4, call light within reach.  Will continue to monitor.     
Patient lying in bed. Avasys in place. Removes 02 often. Benadryl recently given. Comfort measures attempted.     Bed alarm activated. Close to the nurses station.      
Patient starting to moan and guarding her left hip. Medication given per MD PRN orders for comfort. No other changes at this time. Call light in reach. Near nurses station and bed alarm activated for safety.    
Patient transported to C5 in stable condition in the care of Ronnie at this time. VSS, on 5L NC. All standard safety measures in place.   
Perfect Serve sent to Martha Gupta NP:     Martha,     This patient is a DNRCC, disoriented, dementia, had a left hip nailing today with ortho for palliative care. Last night prior to surgery we were giving 4mg of morphine about every 3-4 hours. Post surgery today they opted to do morphine 2mg every 2 hours. The 2mg is not keeping this patient comfortable. She is restless, agitated, hitting, pushing people away, not leaving her o2, or tele on.   I cannot give her oral medication that is ordered for the simple fact that I dont know if she will swollow it or get choked with her mentation.   I need a different intervention to keep this patient comfortable. Please advise.     Thank you,   Beth MUJICA, RN.  
Perfect Serve sent to Martha Gupta NP:     Martha,    I have provided this patient the 2mg of morphine at 2015 and at 2238. I still have a few more minutes before I can give more. I gave her the fentanyl 25mg at 2307 and we still have time before I can give more of this.  Ann has called multiple times due to the patient taking the 02 out of her nose and being restless. She has pulled off her leads. They have been replaced. She has removed gown and pulls at her blankets. She tries to sit up in bed. She can acknowledge when I say \"Jana\" the name she goes by but otherwise she does not respond. She pushes you away and is startled easily.     I attempted for a sitter to since she removes her 02 as soon as you leave the room, but this is not a possibility at this time.     I am not sure if zyprexa would be beneficial or not? Do you have additional advise for helping to keep this patient comfortable so she can rest peacefully with her 02  on?  She is currently on 3LNC.     Thank you,   Beth MUJICA, RN.   
Perfect Serve sent to Martha Gupta NP:    Martha,    I have provided this patient the 2mg of morphine at 2015 and at 2238. I still have a few more minutes before I can give more. I gave her the fentanyl 25mg at 2307 and we still have time before I can give more of this.  Ann has called multiple times due to the patient taking the 02 out of her nose and being restless. She has pulled off her leads. They have been replaced. She has removed gown and pulls at her blankets. She tries to sit up in bed. She can acknowledge when I say \"Jana\" the name she goes by but otherwise she does not respond. She pushes you away and is startled easily.     I attempted for a sitter to since she removes her 02 as soon as you leave the room, but this is not a possibility at this time.     I am not sure if zyprexa would be beneficial or not? Do you have additional advise for helping to keep this patient comfortable so she can rest peacefully with her 02  on?  She is currently on 3LNC.     Thank you,   Beth MUJICA, RN.   
Physical Therapy  Facility/Department: [facility] - MED SURG/ORTHO  Daily Treatment Note  NAME: Crystal Pandey  : 1940  MRN: [id number]    Date of Service: 2025    Discharge Recommendations:  Subacute/Skilled Nursing Facility   PT Equipment Recommendations  Equipment Needed: No    Patient Diagnosis(es): The primary encounter diagnosis was Fall, initial encounter. A diagnosis of Closed fracture of left hip, initial encounter (HCC) was also pertinent to this visit.    Assessment  Assessment: Pt currently requires max A x2 for bed mobility and mod/max A for sitting balance at EOB. Pt would benefit from continued skilled PT to address these limitations. Recommend SNF for continued therapy.  Activity Tolerance: Treatment limited secondary to decreased cognition  Equipment Needed: No    Plan  Physical Therapy Plan  General Plan: 3-5 times per week  Current Treatment Recommendations: Strengthening;Balance training;ROM;Functional mobility training;Transfer training;Endurance training;Home exercise program;Therapeutic activities;Safety education & training;Co-Treatment;Patient/Caregiver education & training;Positioning    Restrictions  Restrictions/Precautions  Restrictions/Precautions: Weight Bearing, Fall Risk  Activity Level: Up with Assist  Lower Extremity Weight Bearing Restrictions  Left Lower Extremity Weight Bearing: Weight Bearing As Tolerated  Position Activity Restriction  Other Position/Activity Restrictions: telesitter     Subjective   Subjective  Subjective: Pt agrees to PT session; dtr present in room  Pain: Pt denies pain at rest    Objective  Vitals  Vitals:    25 0730   BP: (!) 140/63   Pulse: 60   Resp: 14   Temp: 99.1 °F (37.3 °C)   SpO2: 96%      Bed Mobility Training  Bed Mobility Training: Yes  Supine to Sit: Substantial/Maximal assistance;2 Person assistance  Sit to Supine: Substantial/Maximal assistance;2 Person assistance  Balance  Sitting: Impaired  Sitting - Static: Poor 
Physical Therapy  Facility/Department: [place] [address] - MED SURG/ORTHO  Physical Therapy Initial Assessment    Name: Crystal Pandey  : 1940  MRN: [id number]  Date of Service: 2025    Discharge Recommendations:  Subacute/Skilled Nursing Facility   PT Equipment Recommendations  Equipment Needed: No      Therapy discharge recommendations take into account each patient's current medical complexities and are subject to input/oversight from the patient's healthcare team.   Barriers to Home Discharge:   [] Steps to access home entry or bed/bath:   [x] Unable to transfer, ambulate, or propel wheelchair household distances without assist   [x] Limited available assist at home upon discharge    [x] Patient or family requests d/c to post-acute facility    [x] Poor cognition/safety awareness for d/c to home alone    []Unable to maintain ordered weight bearing status    [] Patient with salient signs of long-standing immobility   [x] Patient is at risk for falls   [] Other:    If pt is unable to be seen after this session, please let this note serve as discharge summary.  Please see case management note for discharge disposition.  Thank you.    Patient Diagnosis(es): The primary encounter diagnosis was Fall, initial encounter. A diagnosis of Closed fracture of left hip, initial encounter (HCC) was also pertinent to this visit.  Past Medical History:  has a past medical history of Alzheimer's dementia (HCC), Atrial flutter (HCC), Atrial tachycardia, CTS (carpal tunnel syndrome), HLD (hyperlipidemia), Hypertension, Mitral regurgitation, Mixed hyperlipidemia, Osteopenia, Paroxysmal atrial fibrillation (HCC), Pneumonia due to group B Streptococcus, unspecified laterality, unspecified part of lung, and Shingles.  Past Surgical History:  has a past surgical history that includes knee surgery (Left, ); Colonoscopy; ablation of dysrhythmic focus (16); Cataract removal with implant (Left, 2018); and eye surgery 
Placed foy catheter 25am60xw balloon, checked balloon before insertion for leaks, no leaks noted. Cleansed meatal area with povidine and inserted foy catheter, pt. Tolerated well, immediate marcello colored urine obtained and sent to lab for processing. Order in per NP, Jj d/t perioperative use. Pt. To have left hip surgery tomorrow am at 630. Pt. Is demented at baseline, dtr at bedside.   
Pt arrived to unit C5  
Pt brought to PACU. Report obtained from OR RN and anesthesia. Pt placed on monitor and O2 at  6L.  Oral airway in place, writer at bedside for monitoring   
Pt given d/c instructions and verbalizes understanding.  
Pt transported to surgery department.   
Report called to [place] @ [facility].  
Report given to C5 EDDIE Esparza  
Response:   Order given to discontinue.     Perfect Serve sent to Gwendolyn Gardner:     This patient pulls at her tele leads. She is post op of a left hip fracture and nailing. A DNR CC. She has an order for telemetry that started Sunday at 1445 for 72 hours. Can we discontinue for patient comfort?    Please advise.     Thank you,   Beth MUJICA, RN.  
Spoke with on call Cardiologist Dr. Caban, he spoke with Hospitalist IBAN Gardner. Per Dr. Caban continue diuresing her for fluid overload, call Cardiology again tmw and make sure Cards sees pt. At 8am, pt. Will just be a high risk pt. For surgery. Pt. Continues on 6LPM high flow. Dtr is available anytime to talk about options her name is Lashanda Noble [phone].   
The patient is resting in bed in a tilted trendelenburg position for assist with precautions for aspiration. No signs noted of pain at this time. Resting in bed with eyes closed. Respirations easy and even rise and fall of the chest. Shelby in place and draining appropriately. Avasure in the room. Currently on 5L 02 NC with saturations of 94%. Call light is in reach. Bed alarm activated for safety.   
This patient had a much better night then the previous night. She has removed her 02 some but has been more peaceful. She did get one dose of benadryl last night which I do believe is the most helpful and pain medication later on this morning.   Her SPO2 is 97% on the 5L NC.   
Vitals:    05/27/25 0745   BP: 132/84   Pulse: (!) 103   Resp: 18   Temp: 98.1 °F (36.7 °C)   SpO2:      Patient very contracted this AM and grimacing. Patient pulled out her IV but was fairly easy to put another in. She has one to her left hand. IV fentyl given for pain then an hour later given IV benadryl. Patient seems to be itching a lot. After benadryl given, patient calmed down a bit and looks a bit more relaxed. Daughters at bedside. Left hip dressings clean, dry, and intact. Patient not taking anything PO and does not respond to questions. Will open her eyes to voice though. Good pedal pulses. Xray ordered for today. Will monitor.   
Cards consult as pt is now post op, and will go back into hospice care    - stop IVF can cont to diuresis for comfort and work load of breathing, back down lasix to 20 mg IV daily for now  5/27 have stopped diuresis.      Acute Resp Failure with hypoxia:   - received IV pain meds in route from EMS, needed o2 suspected hypoxia from pain meds, however given the pleural effusion would like to further evaluate.   - trop is elevated, awaiting ECG   - cont supplemental O2 and supportive care  - 5/26 improving in the post op phase   /5/27 down to 3 liters per nasal canula      High risk for aspiration due to mentation   - keep HOB up, would not give PO unless awake and safe to do so.   - low grade fever      AF/ Flutter: S/P ablation, currently is on Eliquis, on hold  - ECG pending at time of admission   - cont on tele, with falls would not resume Risk outweight benefit      Troponin elevation -  due to                 [] STEMI  [] NSTEMI  [x] Acute/Chronic Myocardial Injury 2nd to CHF  [] Recent MI (with 4 weeks of admission) dated:   [] Type II MI w/ demand ischemia  [] Chronic myocardial injury w/ Troponin Elevation 2nd to CKD/ESRD  [] Myocardial injury due to (other - specify)     Troponin elevation - w/out myocardial injury 2nd to     [] Clinically insignificant Troponin Elevation of unclear etiology  [] Clinically insignificant Troponin Elevation w/out signs and/or symptoms of active ischemia         HyperLipidemia - normally controlled on home Statin. Continued.  Follow up w/ PCP outpatient for medication initiation and/or adjustment as needed.          HTN - w/out known CAD and no evidence of active signs and/or symptoms of ischemia and/or failure. Currently controlled on home meds w/ vitals documented and reviewed.        Alzheimer's Dementia: safety and supportive care - can cont namenda as tolerated      Overall poor prognosis: pt has yet to return to baseline, requiring IV pain meds, no PO intake, given her 
To: Patient  Education Provided: Role of Therapy;Plan of Care;Precautions;Orientation;ADL Adaptive Strategies  Education Provided Comments: Pt educated on importance of OOB mobility, prevention of complications of bedrest, and general safety during hospitalization  Education Method: Demonstration;Verbal  Barriers to Learning: Cognition  Education Outcome: Continued education needed    Goals  Short Term Goals  Time Frame for Short Term Goals: 1 week (6/3) unless noted--Goals ongoing 5/30  Short Term Goal 1: Pt will roll side to side in bed with mod A for ADL tasks and pressure relief  Short Term Goal 2: Pt will sit EOB x5 min with min A by 6/1--Goal met 5/30  Short Term Goal 3: Pt will perform sit to stand with max x2 from EOB  Short Term Goal 4: Pt will tolerate BUE ROM exercises 5-10x each to facilitate positioning of BUE in neutral position  Short Term Goal 5: Pt will perform 2 grooming tasks while seated with mod A  Patient Goals   Patient goals : Pt did not provide    AM-PAC - ADL  AM-PAC Daily Activity - Inpatient   How much help is needed for putting on and taking off regular lower body clothing?: Total  How much help is needed for bathing (which includes washing, rinsing, drying)?: Total  How much help is needed for toileting (which includes using toilet, bedpan, or urinal)?: Total  How much help is needed for putting on and taking off regular upper body clothing?: A Lot  How much help is needed for taking care of personal grooming?: A Lot  How much help for eating meals?: A Lot  AM-PAC Inpatient Daily Activity Raw Score: 9  AM-PAC Inpatient ADL T-Scale Score : 25.33  ADL Inpatient CMS 0-100% Score: 79.59  ADL Inpatient CMS G-Code Modifier : CL    Therapy Time   Individual Concurrent Group Co-treatment   Time In       0837   Time Out       0900   Minutes       23   Timed Code Treatment Minutes: 23 Minutes     Trinity Arias OTR/L      
bed without using bedrails?: A Lot  How much help is needed moving to and from a bed to a chair?: Total  How much help is needed standing up from a chair using your arms?: Total  How much help is needed walking in hospital room?: Total  How much help is needed climbing 3-5 steps with a railing?: Total  AM-PAC Inpatient Mobility Raw Score : 8  AM-PAC Inpatient T-Scale Score : 28.52  Mobility Inpatient CMS 0-100% Score: 86.62  Mobility Inpatient CMS G-Code Modifier : CM         Therapy Time   Individual Concurrent Group Co-treatment   Time In       0836   Time Out       0900   Minutes       24   Timed Code Treatment Minutes: 24 Minutes       Cliff Ortiz           
data.)    Patient Education  Education Given To: Patient  Education Provided: Role of Therapy;Transfer Training;Mobility Training;Plan of Care;Equipment;Precautions;Orientation  Education Provided Comments: Pt educated on weight bearing status, post-op precautions, appropriate DME, and safe mobility with AD.  Education Method: Demonstration;Verbal  Barriers to Learning: Cognition  Education Outcome: Continued education needed    Goals  Short Term Goals  Time Frame for Short Term Goals: 1 week (6/3) unless noted--Goals ongoing 5/29  Short Term Goal 1: Pt will roll side to side in bed with mod A for ADL tasks and pressure relief  Short Term Goal 2: Pt will sit EOB x5 min with min A by 6/1  Short Term Goal 3: Pt will perform sit to stand with max x2 from EOB  Short Term Goal 4: Pt will tolerate BUE ROM exercises 5-10x each to facilitate positioning of BUE in neutral position  Patient Goals   Patient goals : Pt did not provide    AM-PAC - ADL  AM-PAC Daily Activity - Inpatient   How much help is needed for putting on and taking off regular lower body clothing?: Total  How much help is needed for bathing (which includes washing, rinsing, drying)?: Total  How much help is needed for toileting (which includes using toilet, bedpan, or urinal)?: Total  How much help is needed for putting on and taking off regular upper body clothing?: A Lot  How much help is needed for taking care of personal grooming?: A Lot  How much help for eating meals?: Total  AM-PAC Inpatient Daily Activity Raw Score: 8  AM-PAC Inpatient ADL T-Scale Score : 22.86  ADL Inpatient CMS 0-100% Score: 85.69  ADL Inpatient CMS G-Code Modifier : CM    Therapy Time   Individual Concurrent Group Co-treatment   Time In       1255   Time Out       1325   Minutes       30   Timed Code Treatment Minutes: 30 Minutes     Trinity Arias OTR/L      
SURGERY  IP CONSULT TO CARDIOLOGY  IP CONSULT TO PHARMACY        --------------------------------------------------      Medications:        Infusion Medications    sodium chloride 75 mL/hr at 05/26/25 0543     Scheduled Medications    atorvastatin  20 mg Oral Daily    DULoxetine  30 mg Oral Daily    losartan  50 mg Oral Daily    memantine  10 mg Oral BID    metoprolol succinate  50 mg Oral Daily    furosemide  40 mg IntraVENous BID    dilTIAZem  180 mg Oral Daily     PRN Meds: magnesium sulfate, polyethylene glycol, ondansetron **OR** ondansetron, ondansetron **OR** ondansetron, acetaminophen **OR** acetaminophen, oxyCODONE **OR** oxyCODONE, morphine **OR** morphine, hydrALAZINE      Physical Exam Performed:      General appearance:  No apparent distress  Respiratory:  Normal respiratory effort without tachypnea. Raymond lat congestion   Cardiovascular:  Regular Rate w/ Regular rhythm.  Abdomen:  Soft, non-tender, non-distended. Bowel sounds normal  Musculoskeletal:  No edema drsging to left hip intact,PPP   Neurologic:  defer  Psychiatric:  Unassessable    BP (!) 122/57   Pulse 84   Temp 97.5 °F (36.4 °C) (Axillary)   Resp 20   Ht 1.626 m (5' 4\")   Wt 72.6 kg (160 lb)   SpO2 94%   BMI 27.46 kg/m²     Telemetry:      Personally reviewed and interpreted telemetry (Rhythm Strip) on 5/26/2025.  Patient is currently ON tele demonstrating NSR w/ controlled rate with first degree AV block     Diet: Diet NPO Exceptions are: Sips of Water with Meds  Can adv as mentation improved, monitor for asp     DVT Prophylaxis: PPX dose LMWH    Code status: DNR-CC    PT/OT Eval Status: PT/OT continuing to assess    Multi-Disciplinary Rounds with Case Management completed on 5/26/2025 with the following recs:     Anticipated Discharge Location: Home w/ Hospice/ LTC      Anticipated Discharge Day/Date:  5/28/25     Barriers to Discharge: Clinical Course    Likely rate limiting factor: 1-2 days 
27.46 kg/m²     Telemetry:      Personally reviewed and interpreted telemetry (Rhythm Strip) on 5/29/2025.  Patient is currently ON tele demonstrating NSR w/ controlled rate with first degree AV block     Diet: ADULT DIET; Regular- bite size pleasure feeds   Can adv as mentation improved, monitor for asp     DVT Prophylaxis: PPX dose LMWH    Code status: DNR-CC    PT/OT Eval Status: PT/OT continuing to assess, LTC with activity as tolerated      Multi-Disciplinary Rounds with Case Management completed on 5/29/2025 with the following recs:     Anticipated Discharge Location: Home w/ Hospice/ LTC      Anticipated Discharge Day/Date:  5/30/25     Barriers to Discharge: Clinical Course    Likely rate limiting factor: 1-2 days     --------------------------------------------------    MDM (any 2 required for High level billing)    A. Problems (any 1)  [x] Acute/Chronic Illness/injury posing ongoing threat to life and/or bodily function without ongoing treatment    [] Severe exacerbation of chronic illness    --------------------------------------------------  B. Risk of Treatment (any 1)    [] Drugs/treatments that require intensive monitoring for toxicity    [] IV ABX (Vancomycin, Aminoglycosides, etc)     [] Post-Cath/Contrast study requiring serial monitoring    [] IV Narcotic analgesia    [] Aggressive IV diuresis    [] Hypertonic Saline    [] Critical electrolyte abnormalities requiring IV replacement    [] Insulin - Scheduled/SSI or Insulin gtt    [] Anticoagulation (Heparin gtt or Coumadin - other anticoagulants in special circumstances)    [] Cardiac Medications (IV Amiodarone/Diltiazem, Tikosyn, etc)    [] Hemodialysis    [] Other -    [] Change in code status    [] Decision to escalate care    [x] Major surgery/procedure with associated risk factors    --------------------------------------------------  C. Data (any 2)    [] Data Review (any 3)    [x] Consultant notes from yesterday/today    [x] All available

## 2025-05-30 NOTE — TELEPHONE ENCOUNTER
rachelle Townsend is completed and is in folder on your desk in front of your keyboard. Pls see 3 pink tags to fill in, copy, and call dtr for p/u. Thx.

## 2025-06-02 NOTE — TELEPHONE ENCOUNTER
Lmom with dtr that the forms was completed and there is a charge due to them being complex.   They returned call and was informed and forms was sent to email.

## 2025-06-03 ENCOUNTER — TELEPHONE (OUTPATIENT)
Dept: ORTHOPEDIC SURGERY | Age: 85
End: 2025-06-03

## 2025-06-03 NOTE — TELEPHONE ENCOUNTER
General Question     Subject: WHEN CAN THEY REMOVE STAPLES?  Patient and /or Facility Request: MILAGROS- [facility]    Contact Number: [phone]    MILAGROS FROM [facility] REQUESTING A CALL BACK STATES THAT PATIENT WILL NOT BE ABLE TO COME TO ANY FOLLOW UP APPOINTMENTS AND NEEDS TO KNOW WHEN WILL BE THE APPROPRIATE TIME TO REMOVE STAPLES     PLEASE CALL MILAGROS AT THE ABOVE NUMBER

## 2025-06-03 NOTE — TELEPHONE ENCOUNTER
ZOEY Whitley at [facility]. Per Dr. Quinteros, okay to remove staples around the 2 week post op cary (as long as incision is healed). Advised to call back if she has additional questions.

## (undated) DEVICE — DRAPE C ARM W/ POLY STRP W42XL72IN FOR MOB XR

## (undated) DEVICE — GLOVE SURG SZ 85 L12IN FNGR THK79MIL GRN LTX FREE

## (undated) DEVICE — 6619 2 PTNT ISO SYS INCISE AREA&LT;(&GT;&&LT;)&GT;P: Brand: STERI-DRAPE™ IOBAN™ 2

## (undated) DEVICE — GAUZE,SPONGE,FLUFF,6"X6.75",STRL,5/TRAY: Brand: MEDLINE

## (undated) DEVICE — 4-PORT MANIFOLD: Brand: NEPTUNE 2

## (undated) DEVICE — DRESSING PETRO W3XL8IN OIL EMUL N ADH GZ KNIT IMPREG CELOS

## (undated) DEVICE — NDL CNTR 40CT FM MAG: Brand: MEDLINE INDUSTRIES, INC.

## (undated) DEVICE — POUCH INSTR W6.75XL11.5IN FRST 2 PKT ADH FOR ORTH AND

## (undated) DEVICE — GLOVE SURG SZ 8 CRM LTX FREE POLYISOPRENE POLYMER BEAD ANTI

## (undated) DEVICE — 3M™ STERI-DRAPE™ INCISE DRAPE 1050 (60CM X 45CM): Brand: STERI-DRAPE™

## (undated) DEVICE — 3M™ STERI-DRAPE™ U-DRAPE 1015: Brand: STERI-DRAPE™

## (undated) DEVICE — OPENING REAMER: Brand: GAMMA

## (undated) DEVICE — DRESSING GZ W3XL16IN CELOS ACETT OIL EMUL N ADH

## (undated) DEVICE — BLADE,CARBON-STEEL,15,STRL,DISPOSABLE,TB: Brand: MEDLINE

## (undated) DEVICE — REAMER SHAFT, MOD.TRINKLE: Brand: BIXCUT

## (undated) DEVICE — C-ARMOR C-ARM EQUIPMENT COVERS CLEAR STERILE UNIVERSAL FIT 12 PER CASE: Brand: C-ARMOR

## (undated) DEVICE — STRIP WND CLSR W1 4XL4IN POLYAMIDE MACROPOROUS NONWOVEN H2O

## (undated) DEVICE — PRECISION PIN TAPERED: Brand: GAMMA

## (undated) DEVICE — BANDAGE COBAN 4 IN COMPR W4INXL5YD FOAM COHESIVE QUIK STK SELF ADH SFT

## (undated) DEVICE — STAZ MAJOR BASIN: Brand: MEDLINE INDUSTRIES, INC.

## (undated) DEVICE — GUIDE WIRE: Brand: T2 ALPHA

## (undated) DEVICE — GOWN,SIRUS,POLYRNF,XLN/3XL,18/CS: Brand: MEDLINE

## (undated) DEVICE — TOWEL,OR,DSP,ST,BLUE,STD,4/PK,20PK/CS: Brand: MEDLINE

## (undated) DEVICE — THREADED GUIDE WIRE
Type: IMPLANTABLE DEVICE | Site: HIP | Status: NON-FUNCTIONAL
Brand: ASNIS
Removed: 2025-05-26

## (undated) DEVICE — Device

## (undated) DEVICE — LOCKING DRILL IMN INSTRUMENTS 4.2X360MM